# Patient Record
Sex: MALE | Race: OTHER | HISPANIC OR LATINO | Employment: OTHER | URBAN - METROPOLITAN AREA
[De-identification: names, ages, dates, MRNs, and addresses within clinical notes are randomized per-mention and may not be internally consistent; named-entity substitution may affect disease eponyms.]

---

## 2018-07-09 ENCOUNTER — HOSPITAL ENCOUNTER (EMERGENCY)
Facility: HOSPITAL | Age: 48
Discharge: HOME | End: 2018-07-09
Attending: EMERGENCY MEDICINE | Admitting: EMERGENCY MEDICINE
Payer: MEDICARE

## 2018-07-09 VITALS
RESPIRATION RATE: 16 BRPM | TEMPERATURE: 97 F | OXYGEN SATURATION: 100 % | DIASTOLIC BLOOD PRESSURE: 76 MMHG | SYSTOLIC BLOOD PRESSURE: 148 MMHG | HEART RATE: 92 BPM

## 2018-07-09 DIAGNOSIS — F32.A DEPRESSION: Primary | ICD-10-CM

## 2018-07-09 DIAGNOSIS — E03.9 HYPOTHYROIDISM: ICD-10-CM

## 2018-07-09 LAB
AMPHETAMINES SERPL QL SCN: NEGATIVE
ANION GAP SERPL CALCULATED.3IONS-SCNC: 10 MMOL/L (ref 4–13)
BACTERIA UR QL AUTO: NORMAL /HPF
BARBITURATES UR QL: NEGATIVE
BASOPHILS # BLD AUTO: 0.03 THOUSANDS/ΜL (ref 0–0.1)
BASOPHILS NFR BLD AUTO: 0 % (ref 0–1)
BENZODIAZ UR QL: NEGATIVE
BILIRUB UR QL STRIP: NEGATIVE
BUN SERPL-MCNC: 6 MG/DL (ref 5–25)
CALCIUM SERPL-MCNC: 9.1 MG/DL (ref 8.3–10.1)
CHLORIDE SERPL-SCNC: 100 MMOL/L (ref 100–108)
CLARITY UR: CLEAR
CO2 SERPL-SCNC: 27 MMOL/L (ref 21–32)
COCAINE UR QL: NEGATIVE
COLOR UR: YELLOW
CREAT SERPL-MCNC: 0.88 MG/DL (ref 0.6–1.3)
EOSINOPHIL # BLD AUTO: 0.01 THOUSAND/ΜL (ref 0–0.61)
EOSINOPHIL NFR BLD AUTO: 0 % (ref 0–6)
ERYTHROCYTE [DISTWIDTH] IN BLOOD BY AUTOMATED COUNT: 13 % (ref 11.6–15.1)
ETHANOL SERPL-MCNC: <3 MG/DL (ref 0–3)
GFR SERPL CREATININE-BSD FRML MDRD: 101 ML/MIN/1.73SQ M
GLUCOSE SERPL-MCNC: 99 MG/DL (ref 65–140)
GLUCOSE UR STRIP-MCNC: NEGATIVE MG/DL
HCT VFR BLD AUTO: 42.3 % (ref 36.5–49.3)
HGB BLD-MCNC: 14.1 G/DL (ref 12–17)
HGB UR QL STRIP.AUTO: ABNORMAL
KETONES UR STRIP-MCNC: NEGATIVE MG/DL
LEUKOCYTE ESTERASE UR QL STRIP: NEGATIVE
LYMPHOCYTES # BLD AUTO: 1.28 THOUSANDS/ΜL (ref 0.6–4.47)
LYMPHOCYTES NFR BLD AUTO: 11 % (ref 14–44)
MCH RBC QN AUTO: 31.3 PG (ref 26.8–34.3)
MCHC RBC AUTO-ENTMCNC: 33.3 G/DL (ref 31.4–37.4)
MCV RBC AUTO: 94 FL (ref 82–98)
METHADONE UR QL: NEGATIVE
MONOCYTES # BLD AUTO: 0.97 THOUSAND/ΜL (ref 0.17–1.22)
MONOCYTES NFR BLD AUTO: 8 % (ref 4–12)
NEUTROPHILS # BLD AUTO: 9.26 THOUSANDS/ΜL (ref 1.85–7.62)
NEUTS SEG NFR BLD AUTO: 80 % (ref 43–75)
NITRITE UR QL STRIP: NEGATIVE
NON-SQ EPI CELLS URNS QL MICRO: NORMAL /HPF
NRBC BLD AUTO-RTO: 0 /100 WBCS
OPIATES UR QL SCN: NEGATIVE
PCP UR QL: NEGATIVE
PH UR STRIP.AUTO: 6 [PH] (ref 4.5–8)
PLATELET # BLD AUTO: 299 THOUSANDS/UL (ref 149–390)
PMV BLD AUTO: 10.8 FL (ref 8.9–12.7)
POTASSIUM SERPL-SCNC: 3.6 MMOL/L (ref 3.5–5.3)
PROT UR STRIP-MCNC: NEGATIVE MG/DL
RBC # BLD AUTO: 4.51 MILLION/UL (ref 3.88–5.62)
RBC #/AREA URNS AUTO: NORMAL /HPF
SODIUM SERPL-SCNC: 137 MMOL/L (ref 136–145)
SP GR UR STRIP.AUTO: 1.01 (ref 1–1.03)
THC UR QL: NEGATIVE
TSH SERPL DL<=0.05 MIU/L-ACNC: 5.76 UIU/ML (ref 0.36–3.74)
UROBILINOGEN UR QL STRIP.AUTO: 0.2 E.U./DL
WBC # BLD AUTO: 11.55 THOUSAND/UL (ref 4.31–10.16)
WBC #/AREA URNS AUTO: NORMAL /HPF

## 2018-07-09 PROCEDURE — 85025 COMPLETE CBC W/AUTO DIFF WBC: CPT | Performed by: EMERGENCY MEDICINE

## 2018-07-09 PROCEDURE — 81001 URINALYSIS AUTO W/SCOPE: CPT

## 2018-07-09 PROCEDURE — 36415 COLL VENOUS BLD VENIPUNCTURE: CPT | Performed by: EMERGENCY MEDICINE

## 2018-07-09 PROCEDURE — 84443 ASSAY THYROID STIM HORMONE: CPT | Performed by: EMERGENCY MEDICINE

## 2018-07-09 PROCEDURE — 80307 DRUG TEST PRSMV CHEM ANLYZR: CPT | Performed by: EMERGENCY MEDICINE

## 2018-07-09 PROCEDURE — 80320 DRUG SCREEN QUANTALCOHOLS: CPT | Performed by: EMERGENCY MEDICINE

## 2018-07-09 PROCEDURE — 99284 EMERGENCY DEPT VISIT MOD MDM: CPT

## 2018-07-09 PROCEDURE — 80048 BASIC METABOLIC PNL TOTAL CA: CPT | Performed by: EMERGENCY MEDICINE

## 2018-07-09 RX ORDER — LEVOTHYROXINE SODIUM 0.05 MG/1
50 TABLET ORAL DAILY
Qty: 15 TABLET | Refills: 1 | Status: SHIPPED | OUTPATIENT
Start: 2018-07-09 | End: 2020-03-25 | Stop reason: ALTCHOICE

## 2018-07-09 NOTE — ED NOTES
Pt presents with increased depression due to losing his job 2 days ago  He was asked to stay for overtime and went home early so he was terminated  He states he needs help paying his bills  Per mother pt has been depressed for the past 2 days and has been stuck  She reports he used to receive disability and then wanted to work  He went to work and now he is having to repay social security back for funds he collected  Pt denies suicidal and homicidal ideations and denies auditory and visual hallucinations  Pt does not have any prior admissions  Pt will be discharged and will follow with the  [employer]  Pt in agreement for discharge

## 2018-07-09 NOTE — ED NOTES
Patient denies taking any medication for Thyroid " I don't need it ", per wife he is supposed to be taking the medication        Donal Cabot, RN  07/09/18 1123

## 2018-07-09 NOTE — ED PROVIDER NOTES
History  Chief Complaint   Patient presents with    Depression     utilizing  patient states "I can't pay for the bills I feel desperate, I'm not able to pay the bills I'm not able to pay the bills", patient denies SI/HI/AH/VH  two days of feeling this depression  52 yo male arrives with his parents, asking for help with emotional distress he describes as depression since losing his job 2 days ago  He denies SI/HI/AH/VH  He has been previously treated and admitted for depression, and is previously diagnosed with hypothyroidism but has been chronically noncompliant with medication  History provided by:  Patient      None       Past Medical History:   Diagnosis Date    Cancer ([facility] [address] )     Depression     Disease of thyroid gland     Psychiatric disorder        Past Surgical History:   Procedure Laterality Date    HERNIA REPAIR         History reviewed  No pertinent family history  I have reviewed and agree with the history as documented  Social History   Substance Use Topics    Smoking status: Never Smoker    Smokeless tobacco: Never Used    Alcohol use No        Review of Systems   Constitutional: Negative for appetite change, chills and fever  HENT: Negative for sore throat  Respiratory: Negative for cough, shortness of breath and wheezing  Cardiovascular: Negative for chest pain and palpitations  Gastrointestinal: Negative for abdominal pain, diarrhea, nausea and vomiting  Genitourinary: Negative for dysuria and hematuria  Musculoskeletal: Negative for neck pain  Skin: Negative for rash  Neurological: Negative for dizziness, weakness and headaches  Psychiatric/Behavioral: Negative for suicidal ideas  The patient is not nervous/anxious  All other systems reviewed and are negative  Physical Exam  Physical Exam   Constitutional: He is oriented to person, place, and time  He appears well-developed and well-nourished     HENT:   Head: Normocephalic and atraumatic  Right Ear: External ear normal    Left Ear: External ear normal    Nose: Nose normal    Mouth/Throat: Oropharynx is clear and moist    Eyes: Conjunctivae and EOM are normal  Pupils are equal, round, and reactive to light  Neck: Normal range of motion  Neck supple  Cardiovascular: Normal rate  Pulmonary/Chest: Effort normal    Abdominal: There is no tenderness  Musculoskeletal: Normal range of motion  Neurological: He is alert and oriented to person, place, and time  No cranial nerve deficit  Coordination normal    Skin: Skin is warm and dry  Psychiatric: He has a normal mood and affect  Judgment and thought content normal  His speech is delayed  He is slowed  Nursing note and vitals reviewed        Vital Signs  ED Triage Vitals [07/09/18 1122]   Temperature Pulse Respirations Blood Pressure SpO2   (!) 97 °F (36 1 °C) 92 16 148/76 100 %      Temp Source Heart Rate Source Patient Position - Orthostatic VS BP Location FiO2 (%)   Temporal Monitor Sitting Right arm --      Pain Score       --           Vitals:    07/09/18 1122   BP: 148/76   Pulse: 92   Patient Position - Orthostatic VS: Sitting       Visual Acuity      ED Medications  Medications - No data to display    Diagnostic Studies  Results Reviewed     Procedure Component Value Units Date/Time    Urine Microscopic [[id number]]  (Normal) Collected:  07/09/18 1237    Lab Status:  Final result Specimen:  Urine from Urine, Clean Catch Updated:  07/09/18 1315     RBC, UA None Seen /hpf      WBC, UA None Seen /hpf      Epithelial Cells Occasional /hpf      Bacteria, UA None Seen /hpf     Rapid drug screen, urine [[id number]]  (Normal) Collected:  07/09/18 1228    Lab Status:  Final result Specimen:  Urine from Urine, Clean Catch Updated:  07/09/18 1303     Amph/Meth UR Negative     Barbiturate Ur Negative     Benzodiazepine Urine Negative     Cocaine Urine Negative     Methadone Urine Negative     Opiate Urine Negative     PCP Ur Negative THC Urine Negative    Narrative:         FOR MEDICAL PURPOSES ONLY  IF CONFIRMATION NEEDED PLEASE CONTACT THE LAB WITHIN 5 DAYS  Drug Screen Cutoff Levels:  AMPHETAMINE/METHAMPHETAMINES  1000 ng/mL  BARBITURATES     200 ng/mL  BENZODIAZEPINES     200 ng/mL  COCAINE      300 ng/mL  METHADONE      300 ng/mL  OPIATES      300 ng/mL  PHENCYCLIDINE     25 ng/mL  THC       50 ng/mL    Basic metabolic panel [[id number]] Collected:  07/09/18 1227    Lab Status:  Final result Specimen:  Blood from Arm, Right Updated:  07/09/18 1259     Sodium 137 mmol/L      Potassium 3 6 mmol/L      Chloride 100 mmol/L      CO2 27 mmol/L      Anion Gap 10 mmol/L      BUN 6 mg/dL      Creatinine 0 88 mg/dL      Glucose 99 mg/dL      Calcium 9 1 mg/dL      eGFR 101 ml/min/1 73sq m     Narrative:         National Kidney Disease Education Program recommendations are as follows:  GFR calculation is accurate only with a steady state creatinine  Chronic Kidney disease less than 60 ml/min/1 73 sq  meters  Kidney failure less than 15 ml/min/1 73 sq  meters  TSH [[id number]]  (Abnormal) Collected:  07/09/18 1227    Lab Status:  Final result Specimen:  Blood from Arm, Right Updated:  07/09/18 1259     TSH 3RD GENERATON 5 761 (H) uIU/mL     Narrative:         Patients undergoing fluorescein dye angiography may retain small amounts of fluorescein in the body for 48-72 hours post procedure  Samples containing fluorescein can produce falsely depressed TSH values  If the patient had this procedure,a specimen should be resubmitted post fluorescein clearance      Ethanol [[id number]]  (Normal) Collected:  07/09/18 1227    Lab Status:  Final result Specimen:  Blood from Arm, Right Updated:  07/09/18 1257     Ethanol Lvl <3 mg/dL     CBC and differential [[id number]]  (Abnormal) Collected:  07/09/18 1227    Lab Status:  Final result Specimen:  Blood from Arm, Right Updated:  07/09/18 1240     WBC 11 55 (H) Thousand/uL      RBC 4 51 Million/uL      Hemoglobin 14 1 g/dL      Hematocrit 42 3 %      MCV 94 fL      MCH 31 3 pg      MCHC 33 3 g/dL      RDW 13 0 %      MPV 10 8 fL      Platelets 140 Thousands/uL      nRBC 0 /100 WBCs      Neutrophils Relative 80 (H) %      Lymphocytes Relative 11 (L) %      Monocytes Relative 8 %      Eosinophils Relative 0 %      Basophils Relative 0 %      Neutrophils Absolute 9 26 (H) Thousands/µL      Lymphocytes Absolute 1 28 Thousands/µL      Monocytes Absolute 0 97 Thousand/µL      Eosinophils Absolute 0 01 Thousand/µL      Basophils Absolute 0 03 Thousands/µL     POCT urinalysis dipstick [[id number]]  (Abnormal) Resulted:  07/09/18 1233    Lab Status:  Final result Updated:  07/09/18 1233    ED Urine Macroscopic [[id number]]  (Abnormal) Collected:  07/09/18 1237    Lab Status:  Final result Specimen:  Urine Updated:  07/09/18 1232     Color, UA Yellow     Clarity, UA Clear     pH, UA 6 0     Leukocytes, UA Negative     Nitrite, UA Negative     Protein, UA Negative mg/dl      Glucose, UA Negative mg/dl      Ketones, UA Negative mg/dl      Urobilinogen, UA 0 2 E U /dl      Bilirubin, UA Negative     Blood, UA Trace (A)     Specific [place], UA 1 010    Narrative:       CLINITEK RESULT                 No orders to display              Procedures  Procedures       Phone Contacts  ED Phone Contact    ED Course                               MDM  Number of Diagnoses or Management Options  Depression:   Hypothyroidism:   Diagnosis management comments: He has an acute social trigger for emotional distress, and is clearly hypothyroid, which is chronic now for years at similar levels that I found through EMR at [facility]  He was previously on 50mcg levothyroxine, so I will start there, encourage outpiatient follow up with PCP and resources from Crisis        CritCare Time    Disposition  Final diagnoses:   Depression   Hypothyroidism     Time reflects when diagnosis was documented in both MDM as applicable and the Disposition within this note     Time User Action Codes Description Comment    7/9/2018  2:18 PM Jaswinder Duarte Add [F32 9] Depression     7/9/2018  2:18 PM Jaswinder Duarte Add [E03 9] Hypothyroidism       ED Disposition     ED Disposition Condition Comment    Discharge  [address] discharge to home/self care  Condition at discharge: Good        Follow-up Information     Follow up With Specialties Details Why [address], [place] Nurse Practitioner Schedule an appointment as soon as possible for a visit For followup [address] [place] [id number] [phone]            Discharge Medication List as of 7/9/2018  2:19 PM      START taking these medications    Details   levothyroxine 50 mcg tablet Take 1 tablet (50 mcg total) by mouth daily for 15 days, Starting Mon 7/9/2018, Until Tue 7/24/2018, Print           No discharge procedures on file      ED Provider  Electronically Signed by           Drake Escamilla MD  07/09/18 7620

## 2018-07-09 NOTE — DISCHARGE INSTRUCTIONS
Hipotiroidismo   LO QUE NECESITA SABER:   El hipotiroidismo es cruz condición que se desarrolla cuando la glándula tiroides no produce suficiente hormona tiroidea  Las hormonas de la tiroides ayudan a controlar la temperatura del cuerpo, el ritmo cardíaco, el crecimiento, y peso corporal   Jodene Ashly:   Llame al 911 en david de presentar lo siguiente:   · Usted tiene dolor de pecho repentino o falta de aire  · Usted sufre cruz convulsión  · Usted siente que se va a desmayar  Regrese a la dominique de emergencias si:   · Usted tiene diarrea, temblores o dificultad para dormir  · Slava piernas, tobillos o pies están inflamados  Pregúntele a donohue Pilar Shove vitaminas y minerales son adecuados para usted  · Usted tiene fiebre  · Usted tiene escalofríos, tos o se siente débil y adolorido  · Usted tiene dolor e inflamación en los músculos y articulaciones  · Usted tiene comezón en la piel, inflamación o un sarpullido  · Slava signos y síntomas regresan o [place], aún después del [address]  · Usted tiene preguntas o inquietudes acerca de donohue condición o cuidado  Medicamentos:   · El reemplazo de la hormona tiroidea  ayuda a regresar el nivel de la hormona tiroidea a donohue normalidad  · [place] slava medicamentos emery se le haya indicado  Consulte con donohue médico si usted darnell que donohue medicamento no le está ayudando o si presenta efectos secundarios  Infórmele si es alérgico a cualquier medicamento  Mantenga cruz lista actualizada de los [place], las vitaminas y los productos herbales que wanda  Incluya los siguientes datos de los medicamentos: cantidad, frecuencia y motivo de administración  Traiga con usted la lista o los envases de la píldoras a slava citas de seguimiento  Lleve la lista de los medicamentos con usted en david de cruz emergencia  Acuda a slava consultas de control con donohue médico según le indicaron    Es probable que tenga que regresar para realizarse más exámenes de santos y controlar slava niveles de la hormona tiroides  Los exámenes mostrarán si usted está recibiendo la cantidad correcta de medicamentos para la tiroides  Anote slava preguntas para que se acuerde de hacerlas husam slava visitas  © 2017 [address] Information is for End User's use only and may not be sold, redistributed or otherwise used for commercial purposes  All illustrations and images included in CareNotes® are the copyrighted property of A D A M  Inc  or Mil Bryant  Esta información es sólo para uso en educación  Gómez intención no es darle un consejo médico sobre enfermedades o tratamientos  Colsulte con gómez Sánchez Ahle farmacéutico antes de seguir cualquier régimen médico para saber si es seguro y efectivo para usted

## 2018-07-10 ENCOUNTER — HOSPITAL ENCOUNTER (EMERGENCY)
Facility: HOSPITAL | Age: 48
Discharge: HOME | End: 2018-07-10
Attending: EMERGENCY MEDICINE | Admitting: EMERGENCY MEDICINE
Payer: MEDICARE

## 2018-07-10 VITALS
RESPIRATION RATE: 18 BRPM | DIASTOLIC BLOOD PRESSURE: 86 MMHG | SYSTOLIC BLOOD PRESSURE: 127 MMHG | TEMPERATURE: 96.9 F | WEIGHT: 141.98 LBS | HEART RATE: 89 BPM

## 2018-07-10 DIAGNOSIS — F32.A DEPRESSION: Primary | ICD-10-CM

## 2018-07-10 PROCEDURE — 99283 EMERGENCY DEPT VISIT LOW MDM: CPT

## 2018-07-10 RX ORDER — CITALOPRAM 20 MG/1
20 TABLET ORAL DAILY
Qty: 7 TABLET | Refills: 0 | Status: SHIPPED | OUTPATIENT
Start: 2018-07-10 | End: 2020-08-05 | Stop reason: ALTCHOICE

## 2018-07-11 NOTE — DISCHARGE INSTRUCTIONS
Depresión   CUIDADO AMBULATORIO:   Depresión  es un trastorno médico que causa sentimientos de tristeza o desesperanza que no desaparecen  La depresión puede causar que usted pierda interés en las cosas que antes disfrutaba  Estos sentimientos pueden llegar a interferir con donohue ness diaria  Los síntomas más comunes incluyen los siguientes:   · Cambios en el apetito, o aumento o pérdida de peso    · Dificultad para ir a dormir o permanecer dormido, o dormir demasiado    · Fatiga o falta de energía    · Sensación de inquietud, irritabilidad o necesidad de alejarse de los demás    · Sentirse inútil, desesperanzado, desanimado o culpable    · Dificultad para concentrarse, recordar cosas, hacer tareas diarias o rainer decisiones    · Sentimientos de hacerse daño o suicidarse  Llame al 911 en david de presentar lo siguiente:   · Usted piensa en lastimarse o lastimar a alguien más  Pregúntele a donohue Cathi Paddy vitaminas y minerales son adecuados para usted  · Imelda síntomas no mejoran  · No puede asistir a donohue próxima juan  · Usted tiene síntomas nuevos  · Usted tiene preguntas o inquietudes acerca de donohue condición o cuidado  El tratamiento para la depresión  puede incluir medicamento para mejorar o estabilizar donohue estado de ánimo  También se puede usar terapia para tratar la depresión  Un terapeuta le ayudará a aprender a lidiar con imelda pensamientos y sentimientos  La terapia se puede realizar ashely o en josh  También podría realizarse con imelda familiares o con donohue fabian  Cuidados personales:   · Realice actividad física regularmente  Trate de ejercitarse por 30 minutos, 3 a 5 días a la semana  Colabore con donohue médico para crear un plan de ejercicios que usted disfrute  La actividad física puede llegar a mejorar imelda síntomas  · Duerma lo suficiente  Invente cruz rutina que le ayude a relajarse antes de irse a dormir  Puede escuchar música, leer o hacer yoga   Trate de irse a dormir y despertarse al [facility] todos los mimi  El sueño es importante para la brina emocional      · Consuma cruz variedad de alimentos saludables de todos los grupos alimenticios  Un plan alimenticio saludable es bajo en grasas, sal y azúcar adicional  Solicite con donohue médico más información sobre un plan de comidas que sea el adecuado para usted  · No tome alcohol ni use drogas  El alcohol y las drogas pueden empeorar slava síntomas  Acuda a slava consultas de control con donohue médico según le indicaron  Donohue médico vigilará donohue progreso en las citas de seguimiento  También monitoreará donohue medicamento si usted está tomando antidepresivos  Donohue médico le preguntará si el [employer] está ayudando  Dígale sobre cualquier efecto secundario o problemas que usted tenga con donohue medicamento  Podría ser necesario cambiar el tipo o cantidad de [place]  Anote slava preguntas para que se acuerde de hacerlas husam slava visitas  © 2017 [address]  Information is for End User's use only and may not be sold, redistributed or otherwise used for commercial purposes  All illustrations and images included in CareNotes® are the copyrighted property of [employer]  or Mil Bryant  Esta información es sólo para uso en educación  Donohue intención no es darle un consejo médico sobre enfermedades o tratamientos  Colsulte con donohue Dorna Dioni farmacéutico antes de seguir cualquier régimen médico para saber si es seguro y efectivo para usted

## 2018-07-11 NOTE — ED PROVIDER NOTES
History  Chief Complaint   Patient presents with    Depression     Patient accompanied by parents, who reports that patient has been depressed and eating very little, and not talking very much for the past 4 days  52 y/o  male here with parents - state patient is "depressed"; was seen/evaluated at [facility] less than 24 hrs ago for same and referred to [facility]  Patient is non-compliant with meds and just lost his job  Patient lives with parents  He denies SI/HI, and states he "needs help paying his bills "  No prior psych admissions or suicide attempts  Prior to Admission Medications   Prescriptions Last Dose Informant Patient Reported? Taking?   levothyroxine 50 mcg tablet   No No   Sig: Take 1 tablet (50 mcg total) by mouth daily for 15 days      Facility-Administered Medications: None       Past Medical History:   Diagnosis Date    Cancer ([facility] [address] )     Depression     Disease of thyroid gland     Psychiatric disorder        Past Surgical History:   Procedure Laterality Date    HERNIA REPAIR         History reviewed  No pertinent family history  I have reviewed and agree with the history as documented  Social History   Substance Use Topics    Smoking status: Never Smoker    Smokeless tobacco: Never Used    Alcohol use No        Review of Systems   Constitutional: Positive for appetite change  Psychiatric/Behavioral: Positive for dysphoric mood  All other systems reviewed and are negative  Physical Exam  Physical Exam   Constitutional: He is oriented to person, place, and time  He appears well-developed and well-nourished  HENT:   Head: Normocephalic and atraumatic  Eyes: Conjunctivae and EOM are normal  Pupils are equal, round, and reactive to light  Neck: Normal range of motion  Neck supple  Cardiovascular: Normal rate, regular rhythm and normal heart sounds  Pulmonary/Chest: Effort normal and breath sounds normal    Abdominal: Soft   Bowel sounds are normal    Musculoskeletal: Normal range of motion  Neurological: He is alert and oriented to person, place, and time  No cranial nerve deficit  Coordination normal    Skin: Skin is warm and dry  Psychiatric: He has a normal mood and affect  Vital Signs  ED Triage Vitals [07/10/18 2255]   Temperature Pulse Respirations Blood Pressure SpO2   (!) 96 9 °F (36 1 °C) 89 18 127/86 --      Temp Source Heart Rate Source Patient Position - Orthostatic VS BP Location FiO2 (%)   Temporal Monitor Sitting Left arm --      Pain Score       No Pain           Vitals:    07/10/18 2255   BP: 127/86   Pulse: 89   Patient Position - Orthostatic VS: Sitting       Visual Acuity      ED Medications  Medications - No data to display    Diagnostic Studies  Results Reviewed     None                 No orders to display              Procedures  Procedures       Phone Contacts  ED Phone Contact    ED Course                               MDM  CritCare Time    Disposition  Final diagnoses:   Depression     Time reflects when diagnosis was documented in both MDM as applicable and the Disposition within this note     Time User Action Codes Description Comment    7/10/2018 11:26 PM Fred Phillips Add [F32 9] Depression       ED Disposition     ED Disposition Condition Comment    Discharge  [address] discharge to home/self care  Condition at discharge: Good        Follow-up Information    None         Patient's Medications   Discharge Prescriptions    CITALOPRAM (CELEXA) 20 MG TABLET    Take 1 tablet (20 mg total) by mouth daily       Start Date: 7/10/2018 End Date: --       Order Dose: 20 mg       Quantity: 7 tablet    Refills: 0     No discharge procedures on file      ED Provider  Electronically Signed by           Bill Stout DO  07/10/18 5281

## 2019-05-28 LAB — EXTERNAL HIV SCREEN: NORMAL

## 2020-01-23 ENCOUNTER — HOSPITAL ENCOUNTER (EMERGENCY)
Facility: HOSPITAL | Age: 50
Discharge: HOME | End: 2020-01-23
Attending: EMERGENCY MEDICINE
Payer: COMMERCIAL

## 2020-01-23 VITALS
RESPIRATION RATE: 18 BRPM | TEMPERATURE: 98 F | SYSTOLIC BLOOD PRESSURE: 134 MMHG | HEART RATE: 96 BPM | DIASTOLIC BLOOD PRESSURE: 84 MMHG | WEIGHT: 160.27 LBS | OXYGEN SATURATION: 98 %

## 2020-01-23 DIAGNOSIS — H57.89 IRRITATION OF LEFT EYE: Primary | ICD-10-CM

## 2020-01-23 PROCEDURE — 99282 EMERGENCY DEPT VISIT SF MDM: CPT | Performed by: PHYSICIAN ASSISTANT

## 2020-01-23 PROCEDURE — 99283 EMERGENCY DEPT VISIT LOW MDM: CPT

## 2020-01-23 NOTE — ED PROVIDER NOTES
History  Chief Complaint   Patient presents with    Eye Redness     Pt c/o left eye redness for 4 days; denies injury; saw eye dr 3 days ago and given rx of eye drops; Pt reports eye drops help but redness comes back     Patient is a 50-year-old male with past medical history of depression and hypothyroidism who presents for evaluation of left eye redness  He states that he had similar symptoms at the beginning of December and saw his eye doctor  He was given a prescription for refresh eyedrops as well as steroid eye drops to use for 1 week  States that he used these in symptoms resolved  He does not recall what his diagnosis was at that time  He states about 2-3 days ago he started with similar  There was some redness and mild irritation to the lateral aspect of the left eye  He states there is a small bump/growth there  He does not recall if this was present in the past or not  He has not seen his eye doctor for this recently  He denies any injury or trauma  No known hx of same  He denies any vision changes, photophobia, pain with eye movement, itching or drainage  He denies other URI symptoms  Denies fever, chills, nausea vomiting diarrhea, headache  No other complaints  Prior to Admission Medications   Prescriptions Last Dose Informant Patient Reported? Taking?   citalopram (CeleXA) 20 mg tablet   No No   Sig: Take 1 tablet (20 mg total) by mouth daily   levothyroxine 50 mcg tablet   No No   Sig: Take 1 tablet (50 mcg total) by mouth daily for 15 days      Facility-Administered Medications: None       Past Medical History:   Diagnosis Date    Cancer ([facility] [address] )     Depression     Disease of thyroid gland     Psychiatric disorder        Past Surgical History:   Procedure Laterality Date    HERNIA REPAIR         History reviewed  No pertinent family history  I have reviewed and agree with the history as documented      Social History     Tobacco Use    Smoking status: Never Smoker    Smokeless tobacco: Never Used   Substance Use Topics    Alcohol use: No    Drug use: No        Review of Systems   Constitutional: Negative for chills and fever  HENT: Negative for congestion, ear pain and sore throat  Eyes: Positive for redness  Negative for photophobia, pain, discharge, itching and visual disturbance  Clear/yellow bump to left eye   Respiratory: Negative for cough and shortness of breath  Cardiovascular: Negative for chest pain  Gastrointestinal: Negative for abdominal pain, diarrhea, nausea and vomiting  Skin: Negative for rash  Neurological: Negative for headaches  All other systems reviewed and are negative  Physical Exam  Physical Exam   Constitutional: Vital signs are normal  He appears well-developed and well-nourished  He is active  Non-toxic appearance  No distress  HENT:   Head: Normocephalic and atraumatic  Right Ear: Hearing, tympanic membrane, external ear and ear canal normal    Left Ear: Hearing, tympanic membrane, external ear and ear canal normal    Nose: Nose normal    Mouth/Throat: Oropharynx is clear and moist  No oropharyngeal exudate  Eyes: Pupils are equal, round, and reactive to light  EOM and lids are normal  Right eye exhibits no chemosis, no discharge, no exudate and no hordeolum  No foreign body present in the right eye  Left eye exhibits no chemosis, no discharge, no exudate and no hordeolum  No foreign body present in the left eye  Right conjunctiva is not injected  Right conjunctiva has no hemorrhage  Left conjunctiva is injected  Left conjunctiva has no hemorrhage  Left lateral eye with small area of injection/irritation  There is a clear/yellow growth  No significant tenderness  No bleeding or drainage  EOMI bilaterally and non tender  No periorbital erythema, swelling, warmth, ecchymosis  Visual acuity 20/25 bilaterally corrected  Neck: Normal range of motion     Cardiovascular: Normal rate, regular rhythm and normal heart sounds  Exam reveals no gallop and no friction rub  No murmur heard  Pulmonary/Chest: Effort normal and breath sounds normal  No stridor  No respiratory distress  He has no wheezes  Neurological: He is alert  Skin: Skin is warm and dry  He is not diaphoretic  Psychiatric: He has a normal mood and affect  His behavior is normal    Nursing note and vitals reviewed  Vital Signs  ED Triage Vitals [01/23/20 1045]   Temperature Pulse Respirations Blood Pressure SpO2   98 °F (36 7 °C) 96 18 134/84 98 %      Temp Source Heart Rate Source Patient Position - Orthostatic VS BP Location FiO2 (%)   Oral Monitor -- -- --      Pain Score       No Pain           Vitals:    01/23/20 1045   BP: 134/84   Pulse: 96         Visual Acuity  Visual Acuity      Most Recent Value   Visual acuity R eye is  20/200   Visual acuity Left eye is  20/200   Visual acuity in both eyes is  20/200   Visual acuity corrected  20/25 right,  left & both eyes   Wearing corrective eyewear/lenses? Yes   L Pupil Size (mm)  3   R Pupil Size (mm)  3   L Pupil Shape  Round   R Pupil Shape  Round          ED Medications  Medications - No data to display    Diagnostic Studies  Results Reviewed     None                 No orders to display              Procedures  Procedures         ED Course                               MDM  Number of Diagnoses or Management Options  Irritation of left eye:   Diagnosis management comments: Small area of irritation and clear/yellow growth to left lateral eye x 2-3 days  Differential diagnosis includes but is not limited to conjunctivitis, conjunctival cyst, pinguecula, pterygium  Patient with no vision changes  No injury or trauma  No drainage or crusting  No itching or pain  No limited or pain with eye movement  Discussed he should follow up with his eye doctor for further evaluation and treatment  Call today to schedule a follow up appointment in 1 day   Advised he may continue to use his refresh lubricating eye drops but would avoid using the steroid eye drops until he sees his eye doctor  Return to the ED if symptoms worsen or new symptoms arise  Patient states understanding and agrees with plan  Patient Progress  Patient progress: stable        Disposition  Final diagnoses:   Irritation of left eye     Time reflects when diagnosis was documented in both MDM as applicable and the Disposition within this note     Time User Action Codes Description Comment    1/23/2020 11:51 AM Sudhir Wilson Add [H57 89] Irritation of left eye       ED Disposition     ED Disposition Condition Date/Time Comment    Discharge Stable Thu Jan 23, 2020 11:47 AM Danya Dalal discharge to home/self care  Follow-up Information     Follow up With Specialties Details Why Contact Info Additional Information        Follow up with your eye doctor in 1 day- call today to schedule an appointment and discuss your symptoms  [facility] for Critical access hospital  Schedule an appointment as soon as possible for a visit in 1 day If you do not have an eye doctor or can not get in to see your doctor [address]  [phone]     [facility] Emergency Department Emergency Medicine  If symptoms worsen [facility] [id number]  [phone] [place] ED, [address] , [place], [place], [address]          Discharge Medication List as of 1/23/2020 11:53 AM      CONTINUE these medications which have NOT CHANGED    Details   citalopram (CeleXA) 20 mg tablet Take 1 tablet (20 mg total) by mouth daily, Starting Tue 7/10/2018, Print      levothyroxine 50 mcg tablet Take 1 tablet (50 mcg total) by mouth daily for 15 days, Starting Mon 7/9/2018, Until Tue 7/24/2018, Print           No discharge procedures on file      ED Provider  Electronically Signed by           Sheela Georges PA-C  01/23/20 [address] [facility], PA-C  01/23/20 4076

## 2020-03-03 ENCOUNTER — TELEPHONE (OUTPATIENT)
Dept: ADMINISTRATIVE | Facility: OTHER | Age: 50
End: 2020-03-03

## 2020-03-03 NOTE — TELEPHONE ENCOUNTER
Upon review of the In Basket request we were able to locate, review, and update the patient chart as requested for HIV  Any additional questions or concerns should be emailed to the Practice Liaisons via [email or web address] email, please do not reply via In Basket      Thank you  Kenneth Sapp

## 2020-03-03 NOTE — TELEPHONE ENCOUNTER
----- Message from Corwin Jung sent at 3/3/2020  9:50 AM EST -----  Regarding: HIV  03/03/20 9:50 AM    Hello, our patient Hiwot Vega has had HIV completed/performed  Please assist in updating the patient chart by pulling the Care Everywhere (CE) document  The date of service is 05/28/2019       Thank you,  Corwin Jung   [address]

## 2020-03-25 ENCOUNTER — APPOINTMENT (OUTPATIENT)
Dept: LAB | Facility: HOSPITAL | Age: 50
End: 2020-03-25
Payer: COMMERCIAL

## 2020-03-25 ENCOUNTER — OFFICE VISIT (OUTPATIENT)
Dept: FAMILY MEDICINE CLINIC | Facility: CLINIC | Age: 50
End: 2020-03-25
Payer: COMMERCIAL

## 2020-03-25 ENCOUNTER — TELEPHONE (OUTPATIENT)
Dept: ADMINISTRATIVE | Facility: OTHER | Age: 50
End: 2020-03-25

## 2020-03-25 VITALS
OXYGEN SATURATION: 98 % | HEART RATE: 100 BPM | BODY MASS INDEX: 27.16 KG/M2 | SYSTOLIC BLOOD PRESSURE: 132 MMHG | RESPIRATION RATE: 18 BRPM | DIASTOLIC BLOOD PRESSURE: 81 MMHG | HEIGHT: 65 IN | TEMPERATURE: 98.2 F | WEIGHT: 163 LBS

## 2020-03-25 DIAGNOSIS — F39 MOOD DISORDER WITH PSYCHOSIS (HCC): ICD-10-CM

## 2020-03-25 DIAGNOSIS — Z12.11 ENCOUNTER FOR SCREENING COLONOSCOPY: ICD-10-CM

## 2020-03-25 DIAGNOSIS — E03.8 OTHER SPECIFIED HYPOTHYROIDISM: ICD-10-CM

## 2020-03-25 DIAGNOSIS — Z12.11 SCREENING FOR COLON CANCER: ICD-10-CM

## 2020-03-25 DIAGNOSIS — R53.83 OTHER FATIGUE: ICD-10-CM

## 2020-03-25 DIAGNOSIS — R73.9 HYPERGLYCEMIA: ICD-10-CM

## 2020-03-25 DIAGNOSIS — Z13.220 SCREENING FOR CHOLESTEROL LEVEL: ICD-10-CM

## 2020-03-25 DIAGNOSIS — E03.8 OTHER SPECIFIED HYPOTHYROIDISM: Primary | ICD-10-CM

## 2020-03-25 DIAGNOSIS — Z85.71 HISTORY OF HODGKIN'S LYMPHOMA: ICD-10-CM

## 2020-03-25 PROBLEM — R01.1 SYSTOLIC MURMUR: Status: ACTIVE | Noted: 2018-09-27

## 2020-03-25 PROBLEM — N28.9 KIDNEY LESION: Status: ACTIVE | Noted: 2019-01-29

## 2020-03-25 LAB
ALBUMIN SERPL BCP-MCNC: 4.5 G/DL (ref 3–5.2)
ALP SERPL-CCNC: 84 U/L (ref 43–122)
ALT SERPL W P-5'-P-CCNC: 26 U/L (ref 9–52)
ANION GAP SERPL CALCULATED.3IONS-SCNC: 9 MMOL/L (ref 5–14)
AST SERPL W P-5'-P-CCNC: 24 U/L (ref 17–59)
BASOPHILS # BLD AUTO: 0.1 THOUSANDS/ΜL (ref 0–0.1)
BASOPHILS NFR BLD AUTO: 1 % (ref 0–1)
BILIRUB SERPL-MCNC: 0.5 MG/DL
BUN SERPL-MCNC: 15 MG/DL (ref 5–25)
CALCIUM SERPL-MCNC: 9.1 MG/DL (ref 8.4–10.2)
CHLORIDE SERPL-SCNC: 104 MMOL/L (ref 97–108)
CHOLEST SERPL-MCNC: 187 MG/DL
CO2 SERPL-SCNC: 26 MMOL/L (ref 22–30)
CREAT SERPL-MCNC: 0.73 MG/DL (ref 0.7–1.5)
EOSINOPHIL # BLD AUTO: 0.1 THOUSAND/ΜL (ref 0–0.4)
EOSINOPHIL NFR BLD AUTO: 1 % (ref 0–6)
ERYTHROCYTE [DISTWIDTH] IN BLOOD BY AUTOMATED COUNT: 13.3 %
EST. AVERAGE GLUCOSE BLD GHB EST-MCNC: 105 MG/DL
GFR SERPL CREATININE-BSD FRML MDRD: 108 ML/MIN/1.73SQ M
GLUCOSE P FAST SERPL-MCNC: 93 MG/DL (ref 70–99)
HBA1C MFR BLD: 5.3 %
HCT VFR BLD AUTO: 48.5 % (ref 41–53)
HDLC SERPL-MCNC: 58 MG/DL
HGB BLD-MCNC: 16.4 G/DL (ref 13.5–17.5)
LDLC SERPL CALC-MCNC: 108 MG/DL
LYMPHOCYTES # BLD AUTO: 1 THOUSANDS/ΜL (ref 0.5–4)
LYMPHOCYTES NFR BLD AUTO: 15 % (ref 25–45)
MCH RBC QN AUTO: 31.4 PG (ref 26–34)
MCHC RBC AUTO-ENTMCNC: 33.8 G/DL (ref 31–36)
MCV RBC AUTO: 93 FL (ref 80–100)
MONOCYTES # BLD AUTO: 0.5 THOUSAND/ΜL (ref 0.2–0.9)
MONOCYTES NFR BLD AUTO: 7 % (ref 1–10)
NEUTROPHILS # BLD AUTO: 5.2 THOUSANDS/ΜL (ref 1.8–7.8)
NEUTS SEG NFR BLD AUTO: 76 % (ref 45–65)
NONHDLC SERPL-MCNC: 129 MG/DL
PLATELET # BLD AUTO: 259 THOUSANDS/UL (ref 150–450)
PMV BLD AUTO: 9.4 FL (ref 8.9–12.7)
POTASSIUM SERPL-SCNC: 3.8 MMOL/L (ref 3.6–5)
PROT SERPL-MCNC: 8.2 G/DL (ref 5.9–8.4)
RBC # BLD AUTO: 5.21 MILLION/UL (ref 4.5–5.9)
SODIUM SERPL-SCNC: 139 MMOL/L (ref 137–147)
TRIGL SERPL-MCNC: 106 MG/DL
TSH SERPL DL<=0.05 MIU/L-ACNC: 9.94 UIU/ML (ref 0.47–4.68)
WBC # BLD AUTO: 6.9 THOUSAND/UL (ref 4.5–11)

## 2020-03-25 PROCEDURE — 99204 OFFICE O/P NEW MOD 45 MIN: CPT | Performed by: NURSE PRACTITIONER

## 2020-03-25 PROCEDURE — 83036 HEMOGLOBIN GLYCOSYLATED A1C: CPT

## 2020-03-25 PROCEDURE — 84443 ASSAY THYROID STIM HORMONE: CPT

## 2020-03-25 PROCEDURE — 36415 COLL VENOUS BLD VENIPUNCTURE: CPT

## 2020-03-25 PROCEDURE — 80061 LIPID PANEL: CPT

## 2020-03-25 PROCEDURE — 1036F TOBACCO NON-USER: CPT | Performed by: NURSE PRACTITIONER

## 2020-03-25 PROCEDURE — 3008F BODY MASS INDEX DOCD: CPT | Performed by: NURSE PRACTITIONER

## 2020-03-25 PROCEDURE — 85025 COMPLETE CBC W/AUTO DIFF WBC: CPT

## 2020-03-25 PROCEDURE — 80053 COMPREHEN METABOLIC PANEL: CPT

## 2020-03-25 PROCEDURE — 84439 ASSAY OF FREE THYROXINE: CPT

## 2020-03-25 RX ORDER — LEVOTHYROXINE SODIUM 0.07 MG/1
75 TABLET ORAL
Qty: 30 TABLET | Refills: 5 | Status: SHIPPED | OUTPATIENT
Start: 2020-03-25 | End: 2020-09-04 | Stop reason: SDUPTHER

## 2020-03-25 RX ORDER — LEVOTHYROXINE SODIUM 0.07 MG/1
TABLET ORAL
COMMUNITY
Start: 2020-01-03 | End: 2020-03-25 | Stop reason: SDUPTHER

## 2020-03-25 NOTE — ASSESSMENT & PLAN NOTE
-He was treated with chemoradiation in 1993 for this  Patient was under heme/onc in [facility]  Today would like to be transferred to [address]  Referral given to heme/onc today for further evaluation

## 2020-03-25 NOTE — PROGRESS NOTES
BMI Counseling: Body mass index is 27 12 kg/m²  The BMI is above normal  Nutrition recommendations include encouraging healthy choices of fruits and vegetables, decreasing fast food intake, consuming healthier snacks, limiting drinks that contain sugar, increasing intake of lean protein, reducing intake of saturated and trans fat and reducing intake of cholesterol  Exercise recommendations include moderate physical activity 150 minutes/week  Depression Screening and Follow-up Plan: Patient's depression screening was positive with a PHQ-2 score of 0  Continue regular follow-up with their mental health provider who is managing their mental health condition(s)  Is going to [facility] to see the therapist  Offered our services but patient declined  Assessment/Plan:    Other specified hypothyroidism  Discussed with patient that the goal of treatment is reduction of symptoms and prevention of long term complications  Treatment is usually given upon establishing the diagnosis and is lifelong  Discussed with patient that there is no evidence that "natural" or pig thyroid extract provides clinically reliable therapy  Also discussed with patient the main complication of over-replacement of thyroid hormone Is increased risk of osteoporosis and a-fib  We discussed increasing or decreasing in small increment every 8 weeks to normalize TSH level  Once we receive at least 2 normal TSH levels we will monitor TSH level every 6-12 months  History of Hodgkin's lymphoma  -He was treated with chemoradiation in 1993 for this  Patient was under heme/onc in [facility] [employer]  Today would like to be transferred to [address]  Referral given to heme/onc today for further evaluation  Mood disorder with psychosis ([facility] [address] )  -Pt symptoms controlled at this time without use of Celexa  Patient denies suicidal/homicidal ideations  Offered referral to psych but at this time he refused          Diagnoses and all orders for this visit:    Other specified hypothyroidism  -     TSH, 3rd generation with Free T4 reflex; Future  -     Lipid panel; Future  -     levothyroxine 75 mcg tablet; Take 1 tablet (75 mcg total) by mouth daily in the early morning    History of Hodgkin's lymphoma  -     Ambulatory referral to Hematology / Oncology; Future    Mood disorder with psychosis ([facility] )    Encounter for screening colonoscopy    Screening for cholesterol level    Hyperglycemia  -     Comprehensive metabolic panel; Future  -     HEMOGLOBIN A1C W/ EAG ESTIMATION; Future    Other fatigue  -     CBC and differential; Future    Screening for colon cancer  -     Occult Blood, Fecal Immunochemical; Future    Other orders  -     Discontinue: levothyroxine 75 mcg tablet  -     Cancel: Ambulatory referral to Gastroenterology; Future          Subjective:      Patient ID: Scar Reid is a 47 y o  male  Patient presents with:  Establish Care    47year old male patient here to establish care  PMHx: depression and thyroid  Patient states his depression is controlled  He is not symptomatic  He was using Celexa but as his symptoms are controlled he stopped taking his medication  He states that he does have a therapist he sees at [address] [place] [address] and needs to follow up  Denies suicidal/homicidal ideation  Also history of Hodgkins lymphoma and was seeing heme/onc in [facility], will [facility]  Thyroid Problem   Presents for initial visit  Symptoms include dry skin  Patient reports no cold intolerance, constipation, diarrhea, fatigue, hair loss, heat intolerance, nail problem, palpitations, weight gain or weight loss  The symptoms have been stable  Past treatments include levothyroxine  The treatment provided significant relief  The following procedures have not been performed: radioiodine uptake scan, thyroid FNA, thyroid ultrasound and thyroidectomy   There is no history of atrial fibrillation, diabetes, hyperlipidemia, neuropathy, obesity or osteopenia  Risk factors include family history of hypothyroidism  The following portions of the patient's history were reviewed and updated as appropriate: allergies, current medications, past family history, past medical history, past social history, past surgical history and problem list     Review of Systems   Constitutional: Negative  Negative for appetite change, fatigue, fever, weight gain and weight loss  HENT: Negative  Eyes: Negative  Respiratory: Negative  Negative for cough, chest tightness, shortness of breath and wheezing  Cardiovascular: Negative  Negative for chest pain and palpitations  Gastrointestinal: Negative  Negative for constipation and diarrhea  Endocrine: Negative  Negative for cold intolerance and heat intolerance  Genitourinary: Negative  Musculoskeletal: Negative  Negative for arthralgias  Skin: Negative  Allergic/Immunologic: Negative  Neurological: Negative  Negative for dizziness and headaches  Hematological: Negative  Negative for adenopathy  Does not bruise/bleed easily  Psychiatric/Behavioral: Negative  Objective:      /81 (BP Location: Right arm, Patient Position: Sitting, Cuff Size: Standard)   Pulse 100   Temp 98 2 °F (36 8 °C) (Oral)   Resp 18   Ht 5' 5" (1 651 m)   Wt 73 9 kg (163 lb)   SpO2 98%   BMI 27 12 kg/m²          Physical Exam   Constitutional: He is oriented to person, place, and time  He appears well-developed and well-nourished  No distress  HENT:   Head: Normocephalic and atraumatic  Eyes: Pupils are equal, round, and reactive to light  EOM are normal    Neck: Normal range of motion  Neck supple  Cardiovascular: Normal rate, regular rhythm, normal heart sounds and intact distal pulses  Exam reveals no gallop and no friction rub  No murmur heard  Pulmonary/Chest: Effort normal and breath sounds normal  No respiratory distress  He has no wheezes  Abdominal: Soft  Bowel sounds are normal    Musculoskeletal: Normal range of motion  Lymphadenopathy:     He has no cervical adenopathy  Neurological: He is alert and oriented to person, place, and time  Skin: Skin is warm and dry  Capillary refill takes less than 2 seconds  He is not diaphoretic  Psychiatric: He has a normal mood and affect  His behavior is normal    Nursing note and vitals reviewed

## 2020-03-25 NOTE — TELEPHONE ENCOUNTER
----- Message from Adriane Reyes sent at 3/25/2020 10:46 AM EDT -----  Regarding: AWV  03/25/20 10:46 AM    Hello, our patient Jazz Tiwari has had Medicare AWV completed/performed  Please assist in updating the patient chart by pulling the Care Everywhere (CE) document  The date of service is 4/11/2019       Thank you,  Bety Newberry   [address]

## 2020-03-25 NOTE — TELEPHONE ENCOUNTER
Upon review of the In Basket request we were able to locate, review, and update the patient chart as requested for Medicare AW  Any additional questions or concerns should be emailed to the Practice Liaisons via [email or web address] email, please do not reply via In Basket      Thank you  Martha Rucker

## 2020-03-25 NOTE — ASSESSMENT & PLAN NOTE
Discussed with patient that the goal of treatment is reduction of symptoms and prevention of long term complications  Treatment is usually given upon establishing the diagnosis and is lifelong  Discussed with patient that there is no evidence that "natural" or pig thyroid extract provides clinically reliable therapy  Also discussed with patient the main complication of over-replacement of thyroid hormone Is increased risk of osteoporosis and a-fib  We discussed increasing or decreasing in small increment every 8 weeks to normalize TSH level  Once we receive at least 2 normal TSH levels we will monitor TSH level every 6-12 months

## 2020-03-26 LAB — T4 FREE SERPL-MCNC: 0.85 NG/DL (ref 0.76–1.46)

## 2020-04-27 ENCOUNTER — TELEPHONE (OUTPATIENT)
Dept: HEMATOLOGY ONCOLOGY | Facility: CLINIC | Age: 50
End: 2020-04-27

## 2020-04-28 ENCOUNTER — OFFICE VISIT (OUTPATIENT)
Dept: FAMILY MEDICINE CLINIC | Facility: CLINIC | Age: 50
End: 2020-04-28
Payer: COMMERCIAL

## 2020-04-28 VITALS
OXYGEN SATURATION: 98 % | TEMPERATURE: 97.3 F | SYSTOLIC BLOOD PRESSURE: 130 MMHG | WEIGHT: 166 LBS | DIASTOLIC BLOOD PRESSURE: 82 MMHG | RESPIRATION RATE: 20 BRPM | BODY MASS INDEX: 27.66 KG/M2 | HEART RATE: 100 BPM | HEIGHT: 65 IN

## 2020-04-28 DIAGNOSIS — F39 MOOD DISORDER WITH PSYCHOSIS (HCC): ICD-10-CM

## 2020-04-28 DIAGNOSIS — Z12.5 SCREENING FOR PROSTATE CANCER: ICD-10-CM

## 2020-04-28 DIAGNOSIS — Z00.00 MEDICARE ANNUAL WELLNESS VISIT, SUBSEQUENT: Primary | ICD-10-CM

## 2020-04-28 DIAGNOSIS — E03.8 OTHER SPECIFIED HYPOTHYROIDISM: ICD-10-CM

## 2020-04-28 PROCEDURE — 1036F TOBACCO NON-USER: CPT | Performed by: NURSE PRACTITIONER

## 2020-04-28 PROCEDURE — 3008F BODY MASS INDEX DOCD: CPT | Performed by: NURSE PRACTITIONER

## 2020-04-28 PROCEDURE — G0439 PPPS, SUBSEQ VISIT: HCPCS | Performed by: NURSE PRACTITIONER

## 2020-05-03 ENCOUNTER — APPOINTMENT (OUTPATIENT)
Dept: LAB | Facility: HOSPITAL | Age: 50
End: 2020-05-03
Payer: COMMERCIAL

## 2020-05-03 DIAGNOSIS — Z12.11 SCREENING FOR COLON CANCER: ICD-10-CM

## 2020-05-03 LAB — HEMOCCULT STL QL IA: NEGATIVE

## 2020-05-03 PROCEDURE — G0328 FECAL BLOOD SCRN IMMUNOASSAY: HCPCS

## 2020-06-03 ENCOUNTER — CONSULT (OUTPATIENT)
Dept: HEMATOLOGY ONCOLOGY | Facility: CLINIC | Age: 50
End: 2020-06-03
Payer: COMMERCIAL

## 2020-06-03 VITALS
WEIGHT: 165.6 LBS | HEIGHT: 66 IN | DIASTOLIC BLOOD PRESSURE: 78 MMHG | TEMPERATURE: 98.3 F | BODY MASS INDEX: 26.61 KG/M2 | HEART RATE: 120 BPM | SYSTOLIC BLOOD PRESSURE: 136 MMHG | RESPIRATION RATE: 18 BRPM | OXYGEN SATURATION: 96 %

## 2020-06-03 DIAGNOSIS — Z85.71 HISTORY OF HODGKIN'S LYMPHOMA: ICD-10-CM

## 2020-06-03 PROCEDURE — 99204 OFFICE O/P NEW MOD 45 MIN: CPT | Performed by: INTERNAL MEDICINE

## 2020-06-12 ENCOUNTER — TELEPHONE (OUTPATIENT)
Dept: CARDIOLOGY CLINIC | Facility: CLINIC | Age: 50
End: 2020-06-12

## 2020-06-15 ENCOUNTER — CONSULT (OUTPATIENT)
Dept: CARDIOLOGY CLINIC | Facility: CLINIC | Age: 50
End: 2020-06-15
Payer: COMMERCIAL

## 2020-06-15 VITALS
HEART RATE: 94 BPM | SYSTOLIC BLOOD PRESSURE: 120 MMHG | DIASTOLIC BLOOD PRESSURE: 78 MMHG | BODY MASS INDEX: 27.31 KG/M2 | WEIGHT: 168.2 LBS | TEMPERATURE: 98.2 F

## 2020-06-15 DIAGNOSIS — R53.83 OTHER FATIGUE: ICD-10-CM

## 2020-06-15 DIAGNOSIS — R07.9 CHEST PAIN, UNSPECIFIED TYPE: Primary | ICD-10-CM

## 2020-06-15 DIAGNOSIS — Z85.71 HISTORY OF HODGKIN'S LYMPHOMA: ICD-10-CM

## 2020-06-15 DIAGNOSIS — R06.02 SHORTNESS OF BREATH: ICD-10-CM

## 2020-06-15 DIAGNOSIS — R01.1 SYSTOLIC MURMUR: ICD-10-CM

## 2020-06-15 PROCEDURE — 99204 OFFICE O/P NEW MOD 45 MIN: CPT

## 2020-06-15 PROCEDURE — 1036F TOBACCO NON-USER: CPT

## 2020-06-15 PROCEDURE — 93000 ELECTROCARDIOGRAM COMPLETE: CPT

## 2020-06-15 RX ORDER — ASPIRIN 81 MG/1
81 TABLET ORAL DAILY
Qty: 90 TABLET | Refills: 3 | Status: SHIPPED | OUTPATIENT
Start: 2020-06-15 | End: 2021-06-15

## 2020-06-29 ENCOUNTER — APPOINTMENT (OUTPATIENT)
Dept: LAB | Facility: HOSPITAL | Age: 50
End: 2020-06-29
Payer: COMMERCIAL

## 2020-06-29 ENCOUNTER — OFFICE VISIT (OUTPATIENT)
Dept: FAMILY MEDICINE CLINIC | Facility: CLINIC | Age: 50
End: 2020-06-29
Payer: COMMERCIAL

## 2020-06-29 VITALS
WEIGHT: 169.2 LBS | OXYGEN SATURATION: 97 % | SYSTOLIC BLOOD PRESSURE: 110 MMHG | HEIGHT: 66 IN | BODY MASS INDEX: 27.19 KG/M2 | HEART RATE: 88 BPM | DIASTOLIC BLOOD PRESSURE: 80 MMHG | TEMPERATURE: 98.2 F

## 2020-06-29 DIAGNOSIS — Z85.71 HISTORY OF HODGKIN'S LYMPHOMA: ICD-10-CM

## 2020-06-29 DIAGNOSIS — E03.8 OTHER SPECIFIED HYPOTHYROIDISM: Primary | ICD-10-CM

## 2020-06-29 DIAGNOSIS — E03.8 OTHER SPECIFIED HYPOTHYROIDISM: ICD-10-CM

## 2020-06-29 DIAGNOSIS — R07.89 ATYPICAL CHEST PAIN: ICD-10-CM

## 2020-06-29 LAB
ALBUMIN SERPL BCP-MCNC: 4.4 G/DL (ref 3–5.2)
ALP SERPL-CCNC: 93 U/L (ref 43–122)
ALT SERPL W P-5'-P-CCNC: 22 U/L (ref 9–52)
ANION GAP SERPL CALCULATED.3IONS-SCNC: 7 MMOL/L (ref 5–14)
AST SERPL W P-5'-P-CCNC: 25 U/L (ref 17–59)
BASOPHILS # BLD AUTO: 0.1 THOUSANDS/ΜL (ref 0–0.1)
BASOPHILS NFR BLD AUTO: 1 % (ref 0–1)
BILIRUB SERPL-MCNC: 0.6 MG/DL
BUN SERPL-MCNC: 21 MG/DL (ref 5–25)
CALCIUM SERPL-MCNC: 9.3 MG/DL (ref 8.4–10.2)
CHLORIDE SERPL-SCNC: 102 MMOL/L (ref 97–108)
CO2 SERPL-SCNC: 27 MMOL/L (ref 22–30)
CREAT SERPL-MCNC: 0.88 MG/DL (ref 0.7–1.5)
EOSINOPHIL # BLD AUTO: 0.2 THOUSAND/ΜL (ref 0–0.4)
EOSINOPHIL NFR BLD AUTO: 2 % (ref 0–6)
ERYTHROCYTE [DISTWIDTH] IN BLOOD BY AUTOMATED COUNT: 13.5 %
GFR SERPL CREATININE-BSD FRML MDRD: 99 ML/MIN/1.73SQ M
GLUCOSE P FAST SERPL-MCNC: 99 MG/DL (ref 70–99)
HCT VFR BLD AUTO: 42.8 % (ref 41–53)
HGB BLD-MCNC: 14.8 G/DL (ref 13.5–17.5)
LDH SERPL-CCNC: 374 U/L (ref 313–618)
LYMPHOCYTES # BLD AUTO: 1.5 THOUSANDS/ΜL (ref 0.5–4)
LYMPHOCYTES NFR BLD AUTO: 19 % (ref 25–45)
MCH RBC QN AUTO: 32.2 PG (ref 26–34)
MCHC RBC AUTO-ENTMCNC: 34.7 G/DL (ref 31–36)
MCV RBC AUTO: 93 FL (ref 80–100)
MONOCYTES # BLD AUTO: 0.9 THOUSAND/ΜL (ref 0.2–0.9)
MONOCYTES NFR BLD AUTO: 11 % (ref 1–10)
NEUTROPHILS # BLD AUTO: 5.3 THOUSANDS/ΜL (ref 1.8–7.8)
NEUTS SEG NFR BLD AUTO: 67 % (ref 45–65)
PLATELET # BLD AUTO: 285 THOUSANDS/UL (ref 150–450)
PMV BLD AUTO: 9.4 FL (ref 8.9–12.7)
POTASSIUM SERPL-SCNC: 4.7 MMOL/L (ref 3.6–5)
PROT SERPL-MCNC: 7.8 G/DL (ref 5.9–8.4)
RBC # BLD AUTO: 4.61 MILLION/UL (ref 4.5–5.9)
SODIUM SERPL-SCNC: 136 MMOL/L (ref 137–147)
TSH SERPL DL<=0.05 MIU/L-ACNC: 1.78 UIU/ML (ref 0.47–4.68)
WBC # BLD AUTO: 7.9 THOUSAND/UL (ref 4.5–11)

## 2020-06-29 PROCEDURE — 1036F TOBACCO NON-USER: CPT | Performed by: NURSE PRACTITIONER

## 2020-06-29 PROCEDURE — 85025 COMPLETE CBC W/AUTO DIFF WBC: CPT

## 2020-06-29 PROCEDURE — 3008F BODY MASS INDEX DOCD: CPT | Performed by: NURSE PRACTITIONER

## 2020-06-29 PROCEDURE — 80053 COMPREHEN METABOLIC PANEL: CPT

## 2020-06-29 PROCEDURE — 84443 ASSAY THYROID STIM HORMONE: CPT

## 2020-06-29 PROCEDURE — 99214 OFFICE O/P EST MOD 30 MIN: CPT | Performed by: NURSE PRACTITIONER

## 2020-06-29 PROCEDURE — 36415 COLL VENOUS BLD VENIPUNCTURE: CPT

## 2020-06-29 PROCEDURE — 83615 LACTATE (LD) (LDH) ENZYME: CPT

## 2020-07-06 ENCOUNTER — TELEPHONE (OUTPATIENT)
Dept: PSYCHIATRY | Facility: CLINIC | Age: 50
End: 2020-07-06

## 2020-07-06 NOTE — TELEPHONE ENCOUNTER
Behavorial Health Outpatient Intake Questions    Referred by:  W Timi Malone    Please advised interviewee that they need to answer all questions truthfully to allow for best care and any misrepresentations of information may affect their ability to be seen at this clinic   => Was this discussed? Yes     Behavorial Health Outpatient Intake History -     Presenting Problem (in patient's words): He did go to Psychiatry in the past and wants to re-establish care  Has Depression and was told he has Schizophrenia but wants to confirm  Are there any developmental disabilities? ? If yes, can they speak to you on the phone? If they are too limited to speak to you on phone, refer out No    Are you taking any psychiatric medications? No    => If yes, who prescribes? If yes, are they injectable medications? Does the patient have a language barrier or hearing impairment? Yes Uzbek Speaking    Have you been treated at [facility] by a therapist or a doctor in the past? If yes, who? Yes - Doesn't remember     Has the patient been hospitalized for mental health? Yes   If yes, how long ago was last hospitalization and where was it? 2 years ago - [facility]     Do you actively use alcohol or marijuana or illegal substances? If yes, what and how much - refer out to Drug and alcohol treatment if use is excessive or daily use of illegal substances No concerns of substance abuse are reported  Do you have a community treatment team or ? No    Legal History-     Does the patient have any history of arrests, assisted/senior care time, or DUIs? No  If Yes-  1) What types of charges? 2) When were they last incarcerated? 3) Are they currently on parole or probation? Minor Child-    Who has custody of the child? N/A    Is there a custody agreement? N/A    If there is a custody agreement remind parent that they must bring a copy to the first appt or they will not be seen  Intake Team, please check with provider before scheduling if flags come up such as:  - complex case  - legal history (other than DUI)  - communication barrier concerns are present  - if, in your judgment, this needs further review    ACCEPTED as a patient Yes  => Appointment Date: Wednesday, August 5th at 8:30AM with Dr Wilma Velarde? No    Name of Insurance Co: [address] [place] [address] ID# [id number]  [id number] Phone #  If ins is primary or secondary  If patient is a minor, parents information such as Name, D  O B of guarantor

## 2020-08-05 ENCOUNTER — OFFICE VISIT (OUTPATIENT)
Dept: PSYCHIATRY | Facility: CLINIC | Age: 50
End: 2020-08-05
Payer: COMMERCIAL

## 2020-08-05 DIAGNOSIS — F39 MOOD DISORDER WITH PSYCHOSIS (HCC): ICD-10-CM

## 2020-08-05 DIAGNOSIS — F41.1 GENERALIZED ANXIETY DISORDER: Primary | ICD-10-CM

## 2020-08-05 DIAGNOSIS — F32.4 MAJOR DEPRESSIVE DISORDER WITH SINGLE EPISODE, IN PARTIAL REMISSION (HCC): ICD-10-CM

## 2020-08-05 PROCEDURE — 90791 PSYCH DIAGNOSTIC EVALUATION: CPT | Performed by: PSYCHIATRY & NEUROLOGY

## 2020-08-05 RX ORDER — ESCITALOPRAM OXALATE 10 MG/1
TABLET ORAL
Qty: 30 TABLET | Refills: 1 | Status: ON HOLD | OUTPATIENT
Start: 2020-08-05 | End: 2022-02-25 | Stop reason: SDUPTHER

## 2020-08-05 NOTE — PSYCH
Psychiatric Evaluation - Behavioral Health     Identification Data:Fabrizio Owusu 45 y o  male MRN: [id number]    Chief Complaint: "I am anxious"  History of present illness:  Patient is a 44 yo male who has a history of treatment for depression and was on Celexa and trazodone in the past  He says he was diagnosed with Schizophrenia in the past, although he does not think he has that and denies any of the symptoms, even in the past  He has been treated for depression and anxiety symptoms off and on during his life and had multiple hospitalizations in this area, since age 31  Currently he denies feeling depressed, but feels anxious a couple of times per month, for several day on occasion  He seems to report that he worries excessively, feels tense, edgy, restless, panicky, poor sleep, poor concentration  During these times he seems to stay home and isolates himself, his energy level is low, he has little interest in doing things, his appetite is low  He denies any feelings of hopelessness or thoughts of death  He says that anxiety has been worse and has been present longer than depression  He has had some problems with memory, for many years, it indicates that this does not effect his functioning  He denies any manic or psychotic symptoms  He denies any OCD, PTSD symptoms  Psychiatric Review Of Systems:  Change in sleep: yes  Appetite changes: yes  Weight changes: no  Change in energy/anergy: yes  Change in interest/pleasure/anhedonia: yes  Somatic symptoms: yes  Anxiety/panic: yes  Manic symptoms: no  Guilt feelings:no  Hopeless: no  Self injurious behavior/risky behavior: no    Historical Information     Past Psychiatric History:   He has been hospitalized about 10 times during the past few decades, initially at age 31, the last time 3 years ago  All of them in the [facility], mostly here in [facility]  He has been only on Celexa in the past and Trazodone  He does not remember any of his other meds   He was getting his meds in nearby [facility]  Substance Abuse History:  He denies    Past Medical History   Hypothyroidism    Family Psychiatric History: Mother had depression, brother had depression,  from medication overdose  Social History:  Developmental: born and raised in [place]  Education: high school diploma/GED  Marital history:  x 10 years, has no children  Living arrangement, social support: wife  Occupational History: on permanent disability, 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[employer], other similar jobs  Access to firearms: none       ALLERGIES   NKDA  Mental Status Evaluation:  Appearance:  {Adequate hygiene and grooming   Behavior:  cooperative, friendly and restless and fidgety   Speech:   Language Normal rate and Normal volume  Able to name objects and Able to repeat phrases   Mood:  anxious and depressed   Affect: Thought process constricted  Goal directed and coherent   Associations: Tightly connected   Thought Content:  Does not verbalize delusional material   Perceptual Disturbances: Denies hallucinations and does not appear to be responding to internal stimuli   Risk Potential: No suicidal or homicidal ideation   Orientation  Oriented x 3   Memory Long term memory intact, short term memory impaired, 1/3 words recalled after a few minutes   Attention/Concentration attention span and concentration impaired, unable to spell 5 letter word backwards      Fund of knowledge aware of current events, Aware of past history and vocabulary Average   Insight:  Good insight   Judgment: Good judgment   Gait/Station: normal gait/station   Motor Activity: No abnormal movement noted             Assessment/Plan   Diagnoses and all orders for this visit:    Generalized anxiety disorder  -     escitalopram (LEXAPRO) 10 mg tablet; 1/2 tab qam x 5 days then 1 tab qam     Mood disorder with psychosis ([facility] [address] )  -     Ambulatory referral to Psychiatry  -     escitalopram (LEXAPRO) 10 mg tablet; 1/2 tab qam x 5 days then 1 tab qam     Major depressive disorder with single episode, in partial remission (HCC)        Plan:   Gave 2 months meds, follow up in 6 weeks  Refer for therapy, Divehi speaking person  Risks, benefits and possible side effects of Medications:   Risks, benefits, and possible side effects of medications explained to patient and patient verbalizes understanding

## 2020-08-05 NOTE — BH TREATMENT PLAN
TREATMENT PLAN (Medication Management Only)        [facility]    Name and Date of Birth:  Crystal Hayes 45 y o  1970  Date of Treatment Plan: August 5, 2020  Diagnosis/Diagnoses:    1  Generalized anxiety disorder    2  Mood disorder with psychosis ([facility] [address] )    3  Major depressive disorder with single episode, in partial remission [facility])      Strengths/Personal Resources for Self-Care: supportive family, taking medications as prescribed, ability to communicate needs, ability to understand psychiatric illness  Area/Areas of need (in own words): anxiety, depression  1  Long Term Goal: alleviate anxiety  Target Date: 2 months - 10/5/2020  Person/Persons responsible for completion of goal: Fabrizio  2  Short Term Objective (s) - How will we reach this goal?:   A  Provider new recommended medication/dosage changes and/or continue medication(s): continue current medications as prescribed Lexapro  B  exercise, diet watching, word games  C  music, meditation, therapy  Target Date: 6 months - 2/5/2021  Person/Persons Responsible for Completion of Goal: Fabrizio  Progress Towards Goals: starting treatment  Treatment Modality: medication management every 6 weeks  Review due 90 to 120 days from date of this plan: 4 months - 12/5/2020  Expected length of service: over 1 year  My Physician/PA/NP and I have developed this plan together and I agree to work on the goals and objectives  I understand the treatment goals that were developed for my treatment

## 2020-08-12 ENCOUNTER — OFFICE VISIT (OUTPATIENT)
Dept: HEMATOLOGY ONCOLOGY | Facility: CLINIC | Age: 50
End: 2020-08-12
Payer: COMMERCIAL

## 2020-08-12 VITALS
OXYGEN SATURATION: 98 % | RESPIRATION RATE: 16 BRPM | TEMPERATURE: 97.5 F | HEIGHT: 65 IN | DIASTOLIC BLOOD PRESSURE: 80 MMHG | SYSTOLIC BLOOD PRESSURE: 130 MMHG | HEART RATE: 85 BPM | BODY MASS INDEX: 28.12 KG/M2 | WEIGHT: 168.8 LBS

## 2020-08-12 DIAGNOSIS — Z85.71 HISTORY OF HODGKIN'S LYMPHOMA: Primary | ICD-10-CM

## 2020-08-12 PROCEDURE — 3008F BODY MASS INDEX DOCD: CPT | Performed by: INTERNAL MEDICINE

## 2020-08-12 PROCEDURE — 99213 OFFICE O/P EST LOW 20 MIN: CPT | Performed by: INTERNAL MEDICINE

## 2020-08-12 PROCEDURE — 1036F TOBACCO NON-USER: CPT | Performed by: INTERNAL MEDICINE

## 2020-08-12 NOTE — PROGRESS NOTES
Hematology/Oncology Outpatient Follow-up  Nikolai Bennett 45 y o  male 1970 [id number]    Date:  8/12/2020        Assessment and Plan:  1  History of Hodgkin's lymphoma  The diagnosis of Hodgkin's lymphoma was more than 25 years ago  He is cured from that diagnosis  At this point it would be appropriate to continue with surveillance for long-term side effects  I did advise him to follow-up with the Cardiology team and consider doing evaluation to rule out the coronary artery disease  He will also need to get his screening colonoscopy since he is 45years old which will be ordered prior to his next visit in 6 months  TSH needs to be monitored closely  - CBC and differential; Future  - Comprehensive metabolic panel; Future  - LD,Blood; Future  - Ambulatory referral to Gastroenterology; Future  - TSH, 3rd generation with Free T4 reflex; Future        HPI:  This is a 79-year-old  gentleman with history of Hodgkin's lymphoma which was diagnosed around 45  The patient stated that he was treated at [facility] with chemotherapy and radiation to the neck and central chest region around the time of the diagnosis  He was then followed by his oncologist on regular basis until recently  He came in today to establish oncological follow-up  The patient denies any constitutional symptoms  He did however complain about dyspnea on exertion and occasional tachycardia  He seems to be on Synthroid for his hypothyroidism which is most likely late side effect from the radiation treatment of the neck region  Interval history:  The patient came today for a follow-up visit  He was supposed to get further evaluation by the Cardiology team with the stress test which he missed for unknown reason  His blood work on 06/29 showed normal LDH with normal CBC and CMP  His TSH is back to normal at 1 7  His stools were negative for occult blood  He denied significant constitutional symptoms     ROS: Review of Systems   Constitutional: Negative for appetite change, diaphoresis, fatigue and fever  HENT: Negative for congestion, dental problem, facial swelling, hearing loss, tinnitus and trouble swallowing  Eyes: Negative for visual disturbance  Respiratory: Positive for shortness of breath  Negative for cough, chest tightness and wheezing  Cardiovascular: Negative for chest pain and leg swelling  Gastrointestinal: Negative for abdominal distention, abdominal pain, blood in stool, constipation, diarrhea, nausea and vomiting  Genitourinary: Negative for dysuria, hematuria and urgency  Musculoskeletal: Negative for arthralgias, myalgias and neck pain  Skin: Negative  Negative for color change, pallor, rash and wound  Neurological: Positive for headaches  Negative for dizziness, weakness and numbness  Hematological: Negative for adenopathy  Psychiatric/Behavioral: Negative for agitation, behavioral problems, confusion, hallucinations, self-injury and sleep disturbance  The patient is not nervous/anxious and is not hyperactive          Past Medical History:   Diagnosis Date    [facility]     Depression     Disease of thyroid gland     Psychiatric disorder        Past Surgical History:   Procedure Laterality Date    HERNIA REPAIR         Social History     Socioeconomic History    Marital status: /Civil Union     Spouse name: None    Number of children: None    Years of education: None    Highest education level: None   Occupational History    None   Social Needs    Financial resource strain: None    Food insecurity     Worry: None     Inability: None    Transportation needs     Medical: None     Non-medical: None   Tobacco Use    Smoking status: Never Smoker    Smokeless tobacco: Never Used   Substance and Sexual Activity    Alcohol use: No    Drug use: No    Sexual activity: Not Currently   Lifestyle    Physical activity     Days per week: None     Minutes per session: None    Stress: None   Relationships    Social connections     Talks on phone: None     Gets together: None     Attends Yarsanism service: None     Active member of club or organization: None     Attends meetings of clubs or organizations: None     Relationship status: None    Intimate partner violence     Fear of current or ex partner: None     Emotionally abused: None     Physically abused: None     Forced sexual activity: None   Other Topics Concern    None   Social History Narrative    None       History reviewed  No pertinent family history  No Known Allergies      Current Outpatient Medications:     aspirin (ECOTRIN LOW STRENGTH) 81 mg EC tablet, Take 1 tablet (81 mg total) by mouth daily, Disp: 90 tablet, Rfl: 3    escitalopram (LEXAPRO) 10 mg tablet, 1/2 tab qam x 5 days then 1 tab qam , Disp: 30 tablet, Rfl: 1    levothyroxine 75 mcg tablet, Take 1 tablet (75 mcg total) by mouth daily in the early morning, Disp: 30 tablet, Rfl: 5      Physical Exam:  /80 (BP Location: Left arm, Patient Position: Sitting, Cuff Size: Adult)   Pulse 85   Temp 97 5 °F (36 4 °C) (Tympanic)   Resp 16   Ht 5' 5" (1 651 m)   Wt 76 6 kg (168 lb 12 8 oz)   SpO2 98%   BMI 28 09 kg/m²     Physical Exam  Constitutional:       Appearance: He is well-developed  HENT:      Head: Normocephalic and atraumatic  Nose: Nose normal    Eyes:      General: No scleral icterus  Right eye: No discharge  Left eye: No discharge  Conjunctiva/sclera: Conjunctivae normal       Pupils: Pupils are equal, round, and reactive to light  Neck:      Musculoskeletal: Normal range of motion and neck supple  Thyroid: No thyromegaly  Trachea: No tracheal deviation  Cardiovascular:      Rate and Rhythm: Normal rate and regular rhythm  Heart sounds: Normal heart sounds  No murmur  No friction rub  Pulmonary:      Effort: Pulmonary effort is normal  No respiratory distress        Breath sounds: Normal breath sounds  No wheezing or rales  Chest:      Chest wall: No tenderness  Abdominal:      General: Bowel sounds are normal  There is no distension  Palpations: Abdomen is soft  There is no hepatomegaly, splenomegaly or mass  Tenderness: There is no abdominal tenderness  There is no guarding or rebound  Musculoskeletal: Normal range of motion  General: No tenderness or deformity  Lymphadenopathy:      Cervical: No cervical adenopathy  Skin:     General: Skin is warm and dry  Coloration: Skin is not pale  Findings: No erythema or rash  Neurological:      Mental Status: He is alert and oriented to person, place, and time  Cranial Nerves: No cranial nerve deficit  Coordination: Coordination normal       Deep Tendon Reflexes: Reflexes are normal and symmetric  Reflexes normal    Psychiatric:         Behavior: Behavior normal          Thought Content: Thought content normal          Judgment: Judgment normal            Labs:  Lab Results   Component Value Date    WBC 7 90 06/29/2020    HGB 14 8 06/29/2020    HCT 42 8 06/29/2020    MCV 93 06/29/2020     06/29/2020     Lab Results   Component Value Date    K 4 7 06/29/2020     06/29/2020    CO2 27 06/29/2020    BUN 21 06/29/2020    CREATININE 0 88 06/29/2020    GLUF 99 06/29/2020    CALCIUM 9 3 06/29/2020    AST 25 06/29/2020    ALT 22 06/29/2020    ALKPHOS 93 06/29/2020    EGFR 99 06/29/2020     No results found for: TSH    Patient voiced understanding and agreement in the above discussion  Aware to contact our office with questions/symptoms in the interim

## 2020-09-04 DIAGNOSIS — E03.8 OTHER SPECIFIED HYPOTHYROIDISM: ICD-10-CM

## 2020-09-08 RX ORDER — LEVOTHYROXINE SODIUM 0.07 MG/1
75 TABLET ORAL
Qty: 30 TABLET | Refills: 5 | Status: SHIPPED | OUTPATIENT
Start: 2020-09-08 | End: 2021-02-04

## 2020-09-21 ENCOUNTER — OFFICE VISIT (OUTPATIENT)
Dept: PSYCHIATRY | Facility: CLINIC | Age: 50
End: 2020-09-21
Payer: COMMERCIAL

## 2020-09-21 DIAGNOSIS — F41.1 GENERALIZED ANXIETY DISORDER: Primary | ICD-10-CM

## 2020-09-21 DIAGNOSIS — F32.4 MAJOR DEPRESSIVE DISORDER WITH SINGLE EPISODE, IN PARTIAL REMISSION (HCC): ICD-10-CM

## 2020-09-21 PROCEDURE — 90833 PSYTX W PT W E/M 30 MIN: CPT | Performed by: PSYCHIATRY & NEUROLOGY

## 2020-09-21 PROCEDURE — 99213 OFFICE O/P EST LOW 20 MIN: CPT | Performed by: PSYCHIATRY & NEUROLOGY

## 2020-09-21 RX ORDER — HYDROXYZINE HYDROCHLORIDE 25 MG/1
TABLET, FILM COATED ORAL
Qty: 45 TABLET | Refills: 1 | Status: SHIPPED | OUTPATIENT
Start: 2020-09-21 | End: 2022-02-25 | Stop reason: HOSPADM

## 2020-09-21 NOTE — PSYCH
Subjective:     Patient ID: Ciara Glasgow is a 45 y o  male  HPI:   Patient seen for follow up, he has not been taking Lexapro 10 mg daily, he never took the medication, although he filled it  He says he has been feeling better, has been going with his nephew to [place], [place], [place], [place] who drives an over the road truck, for 1 week at a time  Some weeks he goes with the nephew  Some weeks not  Wife doesn't mind him going  He denies any recent anxiety or depression  Denies hopelessness or thoughts of death  He denies manic or psychotic symptoms  Not seeing a therapist anymore, needs Albanian speaking one, had gone to [facility] previously  Discussed coping skills ( religous affiliation, family), in regards to communication  ROS Appetite Changes and Sleep: normal appetite, normal energy level and normal number of sleep hours    Review Of Systems:     Mood Anxiety   Behavior Normal    Thought Content Normal   General Normal    Personality Normal   Other Psych Symptoms Normal   Constitutional Normal   ENT Normal   Cardiovascular Normal    Respiratory Normal    Gastrointestinal Normal   Genitourinary Normal    Musculoskeletal Negative   Integumentary Normal    Neurological Normal    Endocrine Normal    Other Symptoms Normal              Laboratory Results: No results found for this or any previous visit  Substance Abuse History:   Social History     Substance and Sexual Activity   Drug Use No       Family Psychiatric History: No family history on file      The following portions of the patient's history were reviewed and updated as appropriate: allergies, current medications, past family history, past medical history, past social history, past surgical history and problem list     Social History     Socioeconomic History    Marital status: /Civil Union     Spouse name: Not on file    Number of children: Not on file    Years of education: Not on file    Highest education level: Not on file   Occupational History    Not on file   Social Needs    Financial resource strain: Not on file    Food insecurity     Worry: Not on file     Inability: Not on file    Transportation needs     Medical: Not on file     Non-medical: Not on file   Tobacco Use    Smoking status: Never Smoker    Smokeless tobacco: Never Used   Substance and Sexual Activity    Alcohol use: No    Drug use: No    Sexual activity: Not Currently   Lifestyle    Physical activity     Days per week: Not on file     Minutes per session: Not on file    Stress: Not on file   Relationships    Social connections     Talks on phone: Not on file     Gets together: Not on file     Attends Voodoo service: Not on file     Active member of club or organization: Not on file     Attends meetings of clubs or organizations: Not on file     Relationship status: Not on file    Intimate partner violence     Fear of current or ex partner: Not on file     Emotionally abused: Not on file     Physically abused: Not on file     Forced sexual activity: Not on file   Other Topics Concern    Not on file   Social History Narrative    Not on file     Social History     Social History Narrative    Not on file         Objective:       Mental status:  Appearance calm and cooperative  and adequate hygiene and grooming   Mood anxious   Affect affect was blunted   Speech a normal rate and speech soft   Thought Processes normal thought processes   Hallucinations no hallucinations present    Thought Content no delusions   Abnormal Thoughts no suicidal thoughts  and no homicidal thoughts    Orientation  oriented to person and place and time   Remote Memory short term memory intact and long term memory intact   Attention Span concentration intact   Intellect Appears to be of Average Intelligence   Insight Insight intact   Judgement judgment was intact   Muscle Strength Muscle strength and tone were normal and Normal gait    Language no difficulty naming common objects, no difficulty repeating a phrase  and no difficulty writing a sentence    Fund of Knowledge displays adequate knowledge of current events, adequate fund of knowledge regarding past history and adequate fund of knowledge regarding vocabulary    Pain none   Pain Scale 0       Assessment/Plan:       Diagnoses and all orders for this visit:    Generalized anxiety disorder  -     hydrOXYzine HCL (ATARAX) 25 mg tablet; 1 to 2 tabs daily PRN for anxiety    Major depressive disorder with single episode, in partial remission ([facility] [address] )            Treatment Recommendations- Risks Benefits    Gave new script for hydroxyzine to take as needed, since he does not seem to want to take anything daily  Immediate Medical/Psychiatric/Psychotherapy Treatments and Any Precautions: not on meds, not seeing therapist currently, gave number of [employer] in [facility] for [address] speaking services  Psychotherapy Provided: Individual psychotherapy provided  Goals discussed in session: social contacts, other coping skills dicussed  Counseling provided: 20 minutes

## 2020-09-28 DIAGNOSIS — F39 MOOD DISORDER WITH PSYCHOSIS (HCC): ICD-10-CM

## 2020-09-28 DIAGNOSIS — F41.1 GENERALIZED ANXIETY DISORDER: ICD-10-CM

## 2020-09-29 ENCOUNTER — OFFICE VISIT (OUTPATIENT)
Dept: FAMILY MEDICINE CLINIC | Facility: CLINIC | Age: 50
End: 2020-09-29
Payer: COMMERCIAL

## 2020-09-29 VITALS
OXYGEN SATURATION: 98 % | RESPIRATION RATE: 20 BRPM | HEIGHT: 65 IN | HEART RATE: 110 BPM | BODY MASS INDEX: 28.32 KG/M2 | TEMPERATURE: 98.7 F | SYSTOLIC BLOOD PRESSURE: 110 MMHG | WEIGHT: 170 LBS | DIASTOLIC BLOOD PRESSURE: 82 MMHG

## 2020-09-29 DIAGNOSIS — E03.8 OTHER SPECIFIED HYPOTHYROIDISM: Primary | ICD-10-CM

## 2020-09-29 DIAGNOSIS — M79.672 PAIN OF LEFT HEEL: ICD-10-CM

## 2020-09-29 DIAGNOSIS — Z23 NEEDS FLU SHOT: ICD-10-CM

## 2020-09-29 DIAGNOSIS — B35.1 TOENAIL FUNGUS: ICD-10-CM

## 2020-09-29 PROCEDURE — 99214 OFFICE O/P EST MOD 30 MIN: CPT | Performed by: NURSE PRACTITIONER

## 2020-09-29 PROCEDURE — 90682 RIV4 VACC RECOMBINANT DNA IM: CPT | Performed by: NURSE PRACTITIONER

## 2020-09-29 PROCEDURE — G0008 ADMIN INFLUENZA VIRUS VAC: HCPCS | Performed by: NURSE PRACTITIONER

## 2020-09-29 RX ORDER — ESCITALOPRAM OXALATE 10 MG/1
TABLET ORAL
Qty: 30 TABLET | Refills: 0 | OUTPATIENT
Start: 2020-09-29

## 2020-09-29 NOTE — PROGRESS NOTES
BMI Counseling: Body mass index is 28 29 kg/m²  The BMI is above normal  Nutrition recommendations include encouraging healthy choices of fruits and vegetables, decreasing fast food intake, consuming healthier snacks, limiting drinks that contain sugar, increasing intake of lean protein, reducing intake of saturated and trans fat and reducing intake of cholesterol  Exercise recommendations include moderate physical activity 150 minutes/week  Depression Screening and Follow-up Plan: Clincally patient does not have depression  No treatment is required  Assessment/Plan:    Other specified hypothyroidism  -Discussed with patient that the goal of treatment is reduction of symptoms and prevention of long term complications  Treatment is usually given upon establishing the diagnosis and is lifelong  Discussed with patient that there is no evidence that "natural" or pig thyroid extract provides clinically reliable therapy  Also discussed with patient the main complication of over-replacement of thyroid hormone Is increased risk of osteoporosis and a-fib  We discussed increasing or decreasing in small increment every 8 weeks to normalize TSH level  Once we receive at least 2 normal TSH levels we will monitor TSH level every 6-12 months     -Pt to have TSH labs done and continue on levothyroxine    Pain of left heel  The goal of treatment is complete pain relief  This often requires many months of therapy ranging from 4 to 18 months of conservative treatment to achieve goal  I am recommending a combination of rest, weight reduction and foot insoles as well as NSAIDs or Tyelnol  I advised to continue stretching the tendoachilles and plantar fascia  If pain is persistent despite exercise discussed I am recommending patient get steroid injections to site but patient refused at this time  To follow up if s/s do not improve          Diagnoses and all orders for this visit:    Other specified hypothyroidism    Pain of left heel    Toenail fungus  -     Ambulatory referral to Podiatry; Future    Needs flu shot  -     influenza vaccine, quadrivalent, recombinant, PF, 0 5 mL, for patients 18 yr+ (FLUBLOK)          Subjective:      Patient ID: Pedro Pablo Hernandez is a 45 y o  male  Patient presents with: Follow-up  Hypothyroidism    45year old male patient here for follow up for thyroid and heel pain  States he has an appointment for follow up with cardiology for 2D echo and stress echo to be done per patient  Taking baby aspirin as ordered  Thyroid Problem   Presents for follow-up visit  Patient reports no anxiety, constipation, depressed mood, dry skin, fatigue, hair loss, heat intolerance, hoarse voice, palpitations, weight gain or weight loss  The symptoms have been stable  Leg Pain    The incident occurred more than 1 week ago (2 years ago)  There was no injury mechanism  The pain is present in the left heel  The quality of the pain is described as aching  The pain is at a severity of 3/10  The pain is mild  The pain has been fluctuating since onset  Pertinent negatives include no inability to bear weight, loss of motion, numbness or tingling  He reports no foreign bodies present  The symptoms are aggravated by movement and weight bearing  He has tried rest for the symptoms  The treatment provided significant relief  The following portions of the patient's history were reviewed and updated as appropriate: allergies, current medications, past family history, past medical history, past social history, past surgical history and problem list     Review of Systems   Constitutional: Negative  Negative for appetite change, fatigue, fever, weight gain and weight loss  HENT: Negative  Negative for hoarse voice  Eyes: Negative  Respiratory: Negative  Negative for cough, chest tightness, shortness of breath and wheezing  Cardiovascular: Negative  Negative for chest pain and palpitations  Gastrointestinal: Negative  Negative for constipation  Endocrine: Negative  Negative for heat intolerance  Genitourinary: Negative  Musculoskeletal: Positive for joint swelling (left heel pain)  Negative for arthralgias  Skin: Negative  Negative for color change and rash  Allergic/Immunologic: Negative  Neurological: Negative  Negative for dizziness, tingling, numbness and headaches  Hematological: Negative  Psychiatric/Behavioral: Negative  The patient is not nervous/anxious  Objective:      /82 (BP Location: Left arm, Patient Position: Sitting, Cuff Size: Standard)   Pulse (!) 110   Temp 98 7 °F (37 1 °C) (Temporal)   Resp 20   Ht 5' 5" (1 651 m)   Wt 77 1 kg (170 lb)   SpO2 98%   BMI 28 29 kg/m²          Physical Exam  Vitals signs and nursing note reviewed  Constitutional:       General: He is not in acute distress  Appearance: Normal appearance  He is not ill-appearing, toxic-appearing or diaphoretic  HENT:      Head: Normocephalic and atraumatic  Right Ear: External ear normal       Left Ear: External ear normal       Nose: Nose normal  No congestion or rhinorrhea  Mouth/Throat:      Mouth: Mucous membranes are moist       Pharynx: Oropharynx is clear  No oropharyngeal exudate  Eyes:      Extraocular Movements: Extraocular movements intact  Conjunctiva/sclera: Conjunctivae normal       Pupils: Pupils are equal, round, and reactive to light  Neck:      Musculoskeletal: Normal range of motion and neck supple  Cardiovascular:      Rate and Rhythm: Normal rate and regular rhythm  Pulses: Normal pulses  Heart sounds: Normal heart sounds  Pulmonary:      Effort: Pulmonary effort is normal  No respiratory distress  Breath sounds: Normal breath sounds  Abdominal:      General: Bowel sounds are normal       Palpations: Abdomen is soft  Musculoskeletal: Normal range of motion  General: Tenderness (left heel pain) present     Skin:     General: Skin is warm and dry  Capillary Refill: Capillary refill takes less than 2 seconds  Neurological:      General: No focal deficit present  Mental Status: He is alert and oriented to person, place, and time     Psychiatric:         Mood and Affect: Mood normal          Behavior: Behavior normal

## 2020-09-29 NOTE — ASSESSMENT & PLAN NOTE
The goal of treatment is complete pain relief  This often requires many months of therapy ranging from 4 to 18 months of conservative treatment to achieve goal  I am recommending a combination of rest, weight reduction and foot insoles as well as NSAIDs or Tyelnol  I advised to continue stretching the tendoachilles and plantar fascia  If pain is persistent despite exercise discussed I am recommending patient get steroid injections to site but patient refused at this time  To follow up if s/s do not improve

## 2020-09-29 NOTE — ASSESSMENT & PLAN NOTE
-Discussed with patient that the goal of treatment is reduction of symptoms and prevention of long term complications  Treatment is usually given upon establishing the diagnosis and is lifelong  Discussed with patient that there is no evidence that "natural" or pig thyroid extract provides clinically reliable therapy  Also discussed with patient the main complication of over-replacement of thyroid hormone Is increased risk of osteoporosis and a-fib  We discussed increasing or decreasing in small increment every 8 weeks to normalize TSH level   Once we receive at least 2 normal TSH levels we will monitor TSH level every 6-12 months     -Pt to have TSH labs done and continue on levothyroxine

## 2020-10-01 ENCOUNTER — HOSPITAL ENCOUNTER (OUTPATIENT)
Dept: RADIOLOGY | Facility: HOSPITAL | Age: 50
Discharge: HOME | End: 2020-10-01
Attending: PODIATRIST
Payer: COMMERCIAL

## 2020-10-01 ENCOUNTER — TRANSCRIBE ORDERS (OUTPATIENT)
Dept: ADMINISTRATIVE | Facility: HOSPITAL | Age: 50
End: 2020-10-01

## 2020-10-01 DIAGNOSIS — M21.41 FLAT FEET: Primary | ICD-10-CM

## 2020-10-01 DIAGNOSIS — M21.42 FLAT FEET: ICD-10-CM

## 2020-10-01 DIAGNOSIS — M21.42 FLAT FEET: Primary | ICD-10-CM

## 2020-10-01 DIAGNOSIS — M21.41 FLAT FEET: ICD-10-CM

## 2020-10-01 PROCEDURE — 73630 X-RAY EXAM OF FOOT: CPT

## 2020-10-12 ENCOUNTER — HOSPITAL ENCOUNTER (OUTPATIENT)
Dept: NON INVASIVE DIAGNOSTICS | Facility: HOSPITAL | Age: 50
Discharge: HOME | End: 2020-10-12
Payer: COMMERCIAL

## 2020-10-12 DIAGNOSIS — R06.02 SHORTNESS OF BREATH: ICD-10-CM

## 2020-10-12 DIAGNOSIS — R53.83 OTHER FATIGUE: ICD-10-CM

## 2020-10-12 DIAGNOSIS — R07.9 CHEST PAIN, UNSPECIFIED TYPE: ICD-10-CM

## 2020-10-12 DIAGNOSIS — R01.1 SYSTOLIC MURMUR: ICD-10-CM

## 2020-10-12 PROCEDURE — 93306 TTE W/DOPPLER COMPLETE: CPT

## 2020-10-12 PROCEDURE — 93351 STRESS TTE COMPLETE: CPT

## 2020-10-12 PROCEDURE — 93320 DOPPLER ECHO COMPLETE: CPT

## 2020-10-12 PROCEDURE — 93325 DOPPLER ECHO COLOR FLOW MAPG: CPT

## 2020-10-12 PROCEDURE — 93351 STRESS TTE COMPLETE: CPT | Performed by: INTERNAL MEDICINE

## 2020-10-13 LAB
CHEST PAIN STATEMENT: NORMAL
MAX DIASTOLIC BP: 80 MMHG
MAX HEART RATE: 148 BPM
MAX PREDICTED HEART RATE: 169 BPM
MAX. SYSTOLIC BP: 160 MMHG
PROTOCOL NAME: NORMAL
REASON FOR TERMINATION: NORMAL
TARGET HR FORMULA: NORMAL
TEST INDICATION: NORMAL
TIME IN EXERCISE PHASE: NORMAL

## 2020-10-21 ENCOUNTER — OFFICE VISIT (OUTPATIENT)
Dept: CARDIOLOGY CLINIC | Facility: CLINIC | Age: 50
End: 2020-10-21
Payer: COMMERCIAL

## 2020-10-21 VITALS
HEART RATE: 99 BPM | OXYGEN SATURATION: 98 % | TEMPERATURE: 96.6 F | HEIGHT: 65 IN | SYSTOLIC BLOOD PRESSURE: 138 MMHG | DIASTOLIC BLOOD PRESSURE: 80 MMHG | BODY MASS INDEX: 28.22 KG/M2 | WEIGHT: 169.4 LBS

## 2020-10-21 DIAGNOSIS — Z85.71 HISTORY OF HODGKIN'S LYMPHOMA: Primary | ICD-10-CM

## 2020-10-21 DIAGNOSIS — R07.89 ATYPICAL CHEST PAIN: ICD-10-CM

## 2020-10-21 DIAGNOSIS — F41.1 GENERALIZED ANXIETY DISORDER: ICD-10-CM

## 2020-10-21 PROCEDURE — 99214 OFFICE O/P EST MOD 30 MIN: CPT

## 2020-10-21 PROCEDURE — 1036F TOBACCO NON-USER: CPT

## 2020-10-21 RX ORDER — PREDNISONE 10 MG/1
TABLET ORAL
COMMUNITY
Start: 2020-10-15 | End: 2022-02-25 | Stop reason: HOSPADM

## 2021-01-19 ENCOUNTER — TELEPHONE (OUTPATIENT)
Dept: GASTROENTEROLOGY | Facility: MEDICAL CENTER | Age: 51
End: 2021-01-19

## 2021-02-03 DIAGNOSIS — E03.8 OTHER SPECIFIED HYPOTHYROIDISM: ICD-10-CM

## 2021-02-04 ENCOUNTER — LAB (OUTPATIENT)
Dept: LAB | Facility: HOSPITAL | Age: 51
End: 2021-02-04
Attending: INTERNAL MEDICINE
Payer: COMMERCIAL

## 2021-02-04 DIAGNOSIS — Z85.71 HISTORY OF HODGKIN'S LYMPHOMA: ICD-10-CM

## 2021-02-04 DIAGNOSIS — Z12.5 SCREENING FOR PROSTATE CANCER: ICD-10-CM

## 2021-02-04 LAB
ALBUMIN SERPL BCP-MCNC: 4.6 G/DL (ref 3–5.2)
ALP SERPL-CCNC: 97 U/L (ref 43–122)
ALT SERPL W P-5'-P-CCNC: 32 U/L (ref 9–52)
ANION GAP SERPL CALCULATED.3IONS-SCNC: 9 MMOL/L (ref 5–14)
AST SERPL W P-5'-P-CCNC: 45 U/L (ref 17–59)
BASOPHILS # BLD AUTO: 0.1 THOUSANDS/ΜL (ref 0–0.1)
BASOPHILS NFR BLD AUTO: 1 % (ref 0–1)
BILIRUB SERPL-MCNC: 1 MG/DL
BUN SERPL-MCNC: 24 MG/DL (ref 5–25)
CALCIUM SERPL-MCNC: 9.1 MG/DL (ref 8.4–10.2)
CHLORIDE SERPL-SCNC: 105 MMOL/L (ref 97–108)
CO2 SERPL-SCNC: 26 MMOL/L (ref 22–30)
CREAT SERPL-MCNC: 0.85 MG/DL (ref 0.7–1.5)
EOSINOPHIL # BLD AUTO: 0.2 THOUSAND/ΜL (ref 0–0.4)
EOSINOPHIL NFR BLD AUTO: 2 % (ref 0–6)
ERYTHROCYTE [DISTWIDTH] IN BLOOD BY AUTOMATED COUNT: 13.7 %
GFR SERPL CREATININE-BSD FRML MDRD: 101 ML/MIN/1.73SQ M
GLUCOSE P FAST SERPL-MCNC: 94 MG/DL (ref 70–99)
HCT VFR BLD AUTO: 43.7 % (ref 41–53)
HGB BLD-MCNC: 14.4 G/DL (ref 13.5–17.5)
LDH SERPL-CCNC: 552 U/L (ref 313–618)
LYMPHOCYTES # BLD AUTO: 1.5 THOUSANDS/ΜL (ref 0.5–4)
LYMPHOCYTES NFR BLD AUTO: 15 % (ref 25–45)
MCH RBC QN AUTO: 31 PG (ref 26–34)
MCHC RBC AUTO-ENTMCNC: 32.9 G/DL (ref 31–36)
MCV RBC AUTO: 94 FL (ref 80–100)
MONOCYTES # BLD AUTO: 1 THOUSAND/ΜL (ref 0.2–0.9)
MONOCYTES NFR BLD AUTO: 11 % (ref 1–10)
NEUTROPHILS # BLD AUTO: 6.9 THOUSANDS/ΜL (ref 1.8–7.8)
NEUTS SEG NFR BLD AUTO: 71 % (ref 45–65)
PLATELET # BLD AUTO: 293 THOUSANDS/UL (ref 150–450)
PMV BLD AUTO: 9.3 FL (ref 8.9–12.7)
POTASSIUM SERPL-SCNC: 3.7 MMOL/L (ref 3.6–5)
PROT SERPL-MCNC: 8.3 G/DL (ref 5.9–8.4)
PSA SERPL-MCNC: 0.6 NG/ML (ref 0–4)
RBC # BLD AUTO: 4.65 MILLION/UL (ref 4.5–5.9)
SODIUM SERPL-SCNC: 140 MMOL/L (ref 137–147)
TSH SERPL DL<=0.05 MIU/L-ACNC: 3.49 UIU/ML (ref 0.47–4.68)
WBC # BLD AUTO: 9.7 THOUSAND/UL (ref 4.5–11)

## 2021-02-04 PROCEDURE — 83615 LACTATE (LD) (LDH) ENZYME: CPT

## 2021-02-04 PROCEDURE — 85025 COMPLETE CBC W/AUTO DIFF WBC: CPT

## 2021-02-04 PROCEDURE — G0103 PSA SCREENING: HCPCS

## 2021-02-04 PROCEDURE — 36415 COLL VENOUS BLD VENIPUNCTURE: CPT

## 2021-02-04 PROCEDURE — 80053 COMPREHEN METABOLIC PANEL: CPT

## 2021-02-04 PROCEDURE — 84443 ASSAY THYROID STIM HORMONE: CPT

## 2021-02-04 RX ORDER — LEVOTHYROXINE SODIUM 0.07 MG/1
TABLET ORAL
Qty: 30 TABLET | Refills: 4 | Status: SHIPPED | OUTPATIENT
Start: 2021-02-04 | End: 2021-06-08

## 2021-02-11 ENCOUNTER — TELEPHONE (OUTPATIENT)
Dept: HEMATOLOGY ONCOLOGY | Facility: CLINIC | Age: 51
End: 2021-02-11

## 2021-02-12 ENCOUNTER — OFFICE VISIT (OUTPATIENT)
Dept: HEMATOLOGY ONCOLOGY | Facility: CLINIC | Age: 51
End: 2021-02-12
Payer: COMMERCIAL

## 2021-02-12 VITALS
BODY MASS INDEX: 28.96 KG/M2 | RESPIRATION RATE: 18 BRPM | HEIGHT: 65 IN | DIASTOLIC BLOOD PRESSURE: 80 MMHG | HEART RATE: 92 BPM | WEIGHT: 173.8 LBS | SYSTOLIC BLOOD PRESSURE: 130 MMHG | TEMPERATURE: 96.6 F | OXYGEN SATURATION: 97 %

## 2021-02-12 DIAGNOSIS — Z85.71 HISTORY OF HODGKIN'S LYMPHOMA: Primary | ICD-10-CM

## 2021-02-12 PROCEDURE — 3008F BODY MASS INDEX DOCD: CPT | Performed by: NURSE PRACTITIONER

## 2021-02-12 PROCEDURE — 1036F TOBACCO NON-USER: CPT | Performed by: NURSE PRACTITIONER

## 2021-02-12 PROCEDURE — 99214 OFFICE O/P EST MOD 30 MIN: CPT | Performed by: NURSE PRACTITIONER

## 2021-02-12 RX ORDER — DEXAMETHASONE SODIUM PHOSPHATE 1 MG/ML
SOLUTION/ DROPS OPHTHALMIC
COMMUNITY
Start: 2021-01-06 | End: 2022-02-25 | Stop reason: HOSPADM

## 2021-02-12 RX ORDER — PREDNISOLONE ACETATE 10 MG/ML
SUSPENSION/ DROPS OPHTHALMIC
COMMUNITY
End: 2022-02-25 | Stop reason: HOSPADM

## 2021-02-12 RX ORDER — POLYVINYL ALCOHOL 14 MG/ML
SOLUTION/ DROPS OPHTHALMIC
COMMUNITY
Start: 2021-02-08 | End: 2022-02-25 | Stop reason: HOSPADM

## 2021-02-12 NOTE — PROGRESS NOTES
Hematology/Oncology Outpatient Follow-up  Luciano Buck 45 y o  male 1970 [id number]    Date:  2/12/2021      Assessment and Plan:  1  History of Hodgkin's lymphoma  Patient was diagnosed and treated for Hodgkin's lymphoma more than 25 years ago and is cured at this point time  His prior records are not available for review  We will continue to monitor him on an annual basis for survivorship follow-up care  He was advised to continue to follow-up with his cardiologist on a regular basis  Is also overdue for his screening colonoscopy we will call and schedule the appointment with Gastroenterology today for compliance sake  He is agreeable to proceed with the procedure as long as he has a more tolerable prep  We did review pursuing healthier lifestyles after cancer treatment including: following up with PCP on a regular basis for health maintenance/immunizations, getting routine screening exams when indicated, avoiding alcohol, consuming a well-balanced diet with lots of fruits/vegetables/whole grains and limiting greasy/fatty and sugary foods and exercising on a regular basis  - CBC and differential; Future  - Comprehensive metabolic panel; Future  - LD,Blood; Future  - C-reactive protein; Future  - Sedimentation rate, automated; Future  - Lipid panel; Future      HPI:  This is a 48-year-old  gentleman with history of Hodgkin's lymphoma which was diagnosed around 45 (was 21years old)  The patient stated that he was treated at [facility] with chemotherapy and radiation to the neck and central chest region around the time of the diagnosis  Ayannachavez Vernon was then followed by his oncologist on regular basis until recently  He presented to establish oncological follow-up  He seems to be on Synthroid for his hypothyroidism which is most likely late side effect from the radiation treatment of the neck region  Interval history:  Patient presents today for a follow-up visit    He denies any constitutional symptoms  Reports good appetite and energy  He states that he has occasional Small amount of blood with BMs which he feels may be due to do hemorrhoids  Was referred to GI  for screening colonoscopy but does not seem he has been scheduled yet  Reports that he was seen by GI and scheduled for colonoscopy a while back at [facility] but never went through with the procedure as he could not tolerate the prep  He does have chronic dyspnea with exertion  Was evaluated by Cardiology recently who found mild reduced ejection fraction on his echocardiogram with negative stress echo  They recommended aspirin and annual follow-up  His most recent laboratory studies from 02/04/2021 showed normal WBC 9 7 with very mild increase of his monocytes, is not anemic H&H 14 4/43 7, MCV 97 with normal platelet count 262  CMP and LDH are normal   TSH normal 3 48 is on Synthroid 75 mcg daily  PSA normal   His lipid panel March 2020 was normal     ROS: Review of Systems   Constitutional: Negative for activity change, appetite change, chills, fatigue, fever and unexpected weight change  HENT: Positive for trouble swallowing ( minimal)  Negative for congestion, mouth sores, nosebleeds and sore throat  Eyes: Negative  Respiratory: Positive for shortness of breath  Negative for cough and chest tightness  Cardiovascular: Negative for chest pain, palpitations and leg swelling  Gastrointestinal: Negative for abdominal distention, abdominal pain, constipation, diarrhea, nausea and vomiting  Genitourinary: Negative for difficulty urinating, dysuria, frequency, hematuria and urgency  Musculoskeletal: Negative for arthralgias, back pain, gait problem, joint swelling and myalgias  Skin: Negative for color change, pallor and rash  Neurological: Positive for dizziness ( occasional mild) and headaches ( occasional mild)  Negative for weakness, light-headedness and numbness     Hematological: Negative for adenopathy  Does not bruise/bleed easily  Psychiatric/Behavioral: Positive for dysphoric mood and sleep disturbance  Past Medical History:   Diagnosis Date    Cancer ([address] )     Depression     Disease of thyroid gland     Psychiatric disorder        Past Surgical History:   Procedure Laterality Date    HERNIA REPAIR         Social History     Socioeconomic History    Marital status: /Civil Union     Spouse name: None    Number of children: None    Years of education: None    Highest education level: None   Occupational History    None   Social Needs    Financial resource strain: None    Food insecurity     Worry: None     Inability: None    Transportation needs     Medical: None     Non-medical: None   Tobacco Use    Smoking status: Never Smoker    Smokeless tobacco: Never Used   Substance and Sexual Activity    Alcohol use: No    Drug use: No    Sexual activity: Not Currently   Lifestyle    Physical activity     Days per week: None     Minutes per session: None    Stress: None   Relationships    Social connections     Talks on phone: None     Gets together: None     Attends Holiness service: None     Active member of club or organization: None     Attends meetings of clubs or organizations: None     Relationship status: None    Intimate partner violence     Fear of current or ex partner: None     Emotionally abused: None     Physically abused: None     Forced sexual activity: None   Other Topics Concern    None   Social History Narrative    None       No family history on file      No Known Allergies      Current Outpatient Medications:     Artificial Tears 1 4 % ophthalmic solution, , Disp: , Rfl:     aspirin (ECOTRIN LOW STRENGTH) 81 mg EC tablet, Take 1 tablet (81 mg total) by mouth daily, Disp: 90 tablet, Rfl: 3    escitalopram (LEXAPRO) 10 mg tablet, 1/2 tab qam x 5 days then 1 tab qam , Disp: 30 tablet, Rfl: 1    hydrOXYzine HCL (ATARAX) 25 mg tablet, 1 to 2 tabs daily PRN for anxiety, Disp: 45 tablet, Rfl: 1    levothyroxine 75 mcg tablet, TAKE ONE TABLET BY MOUTH ONCE DAILY EARLY IN THE MORNING, Disp: 30 tablet, Rfl: 4    prednisoLONE acetate (PRED FORTE) 1 % ophthalmic suspension, prednisolone acetate 1 % eye drops,suspension, Disp: , Rfl:     dexamethasone sodium phosphate 0 1 % ophthalmic solution, , Disp: , Rfl:     predniSONE 10 mg tablet, , Disp: , Rfl:       Physical Exam:  /80 (BP Location: Left arm, Patient Position: Sitting, Cuff Size: Standard)   Pulse 92   Temp (!) 96 6 °F (35 9 °C) (Tympanic)   Resp 18   Ht 5' 5" (1 651 m)   Wt 78 8 kg (173 lb 12 8 oz)   SpO2 97%   BMI 28 92 kg/m²     Physical Exam  Vitals signs reviewed  Constitutional:       General: He is not in acute distress  Appearance: He is well-developed  He is not diaphoretic  HENT:      Head: Normocephalic and atraumatic  Eyes:      General: Lids are normal  No scleral icterus  Conjunctiva/sclera: Conjunctivae normal       Pupils: Pupils are equal, round, and reactive to light  Neck:      Musculoskeletal: Normal range of motion and neck supple  Thyroid: No thyromegaly  Cardiovascular:      Rate and Rhythm: Normal rate and regular rhythm  Heart sounds: Murmur present  Pulmonary:      Effort: Pulmonary effort is normal  No respiratory distress  Breath sounds: Normal breath sounds  Abdominal:      General: There is no distension  Palpations: Abdomen is soft  There is no hepatomegaly or splenomegaly  Tenderness: There is no abdominal tenderness  Musculoskeletal: Normal range of motion  General: No swelling  Lymphadenopathy:      Cervical: No cervical adenopathy  Upper Body:      Right upper body: No axillary adenopathy  Left upper body: No axillary adenopathy  Skin:     General: Skin is warm and dry  Findings: No erythema or rash  Neurological:      General: No focal deficit present        Mental Status: He is alert and oriented to person, place, and time  Psychiatric:         Mood and Affect: Mood normal  Affect is flat  Behavior: Behavior is slowed  Behavior is cooperative  Thought Content: Thought content normal          Judgment: Judgment normal            Labs:  Lab Results   Component Value Date    WBC 9 70 02/04/2021    HGB 14 4 02/04/2021    HCT 43 7 02/04/2021    MCV 94 02/04/2021     02/04/2021     Lab Results   Component Value Date    K 3 7 02/04/2021     02/04/2021    CO2 26 02/04/2021    BUN 24 02/04/2021    CREATININE 0 85 02/04/2021    GLUF 94 02/04/2021    CALCIUM 9 1 02/04/2021    AST 45 02/04/2021    ALT 32 02/04/2021    ALKPHOS 97 02/04/2021    EGFR 101 02/04/2021       Patient voiced understanding and agreement in the above discussion  Aware to contact our office with questions/symptoms in the interim  This note has been generated by voice recognition software system  Therefore, there may be spelling, grammar, and or syntax errors  Please contact if questions arise

## 2021-04-08 ENCOUNTER — OFFICE VISIT (OUTPATIENT)
Dept: GASTROENTEROLOGY | Facility: MEDICAL CENTER | Age: 51
End: 2021-04-08
Payer: COMMERCIAL

## 2021-04-08 VITALS
TEMPERATURE: 97 F | HEIGHT: 65 IN | WEIGHT: 170 LBS | HEART RATE: 80 BPM | BODY MASS INDEX: 28.32 KG/M2 | SYSTOLIC BLOOD PRESSURE: 130 MMHG | DIASTOLIC BLOOD PRESSURE: 86 MMHG

## 2021-04-08 DIAGNOSIS — Z80.0 FAMILY HISTORY OF COLON CANCER IN FATHER: Primary | ICD-10-CM

## 2021-04-08 DIAGNOSIS — Z85.71 HISTORY OF HODGKIN'S LYMPHOMA: ICD-10-CM

## 2021-04-08 DIAGNOSIS — Z12.11 ENCOUNTER FOR SCREENING COLONOSCOPY: ICD-10-CM

## 2021-04-08 PROCEDURE — 99203 OFFICE O/P NEW LOW 30 MIN: CPT | Performed by: INTERNAL MEDICINE

## 2021-04-08 PROCEDURE — 3008F BODY MASS INDEX DOCD: CPT | Performed by: INTERNAL MEDICINE

## 2021-04-08 PROCEDURE — 1036F TOBACCO NON-USER: CPT | Performed by: INTERNAL MEDICINE

## 2021-04-08 NOTE — PROGRESS NOTES
Outpatient Consultation  [address] [place] [address]  [place]  Ph : [phone]  Fax : [phone]  Mobile : [phone]  Email : [email or web address]  Also available on [employer] Text      Ciara Glasgow 45 y o  male MRN: [id number]    PCP: ELTHA Malin  Referring: Gurpreet Sapp MD  [address]  [address]  [address]    Ciara Glasgow was seen in consultation  My recommendations are included  Please do not hesitate to contact me with any questions you may have  ASSESSMENT AND PLAN:      No problem-specific Assessment & Plan notes found for this encounter  Diagnoses and all orders for this visit:    Family history of colon cancer in father  -     Colonoscopy; Future    History of Hodgkin's lymphoma  -     Ambulatory referral to Gastroenterology    Encounter for screening colonoscopy  -     Colonoscopy; Future    Other orders  -     Diet NPO; Sips with meds; Standing  -     Void on call to OR; Standing  -     Insert peripheral IV; Standing      45 with h/o Hodgkins lymphoma and a higher risk of colon cancer due to family h/o colon cancer in father at age 48  He personally had a h/o colon polyps about 10 years ago  Plan for colonoscopy now and then again every 5 years  Discussed with him and he is agreeable    ______________________________________________________________________    HPI:  46 y/o gentleman who presents for colonoscopy  He has had a hodgkins lymphoma  He has had a history of hemorrhoids many years ago and has had bleeding  He had a colonoscopy done about 10 years ago in [place] and had polyps  He has no diarrhea or constipation  His father had colon cancer (he was in his 52's)  PRIOR ENDOSCOPIC EVALUATION :     Endoscopy : no    Colonoscopy : yes      REVIEW OF SYSTEMS:    CONSTITUTIONAL: Denies any fever, chills, rigors, and weight loss    HEENT: No earache or tinnitus  Denies hearing loss or visual disturbances  CARDIOVASCULAR: No chest pain or palpitations  RESPIRATORY: Denies any cough, hemoptysis, shortness of breath or dyspnea on exertion  GASTROINTESTINAL: As noted in the History of Present Illness  GENITOURINARY: No problems with urination  Denies any hematuria or dysuria  NEUROLOGIC: No dizziness or vertigo, denies headaches  MUSCULOSKELETAL: Denies any muscle or joint pain  SKIN: Denies skin rashes or itching  ENDOCRINE: Denies excessive thirst  Denies intolerance to heat or cold  PSYCHOSOCIAL: Denies depression or anxiety  Denies any recent memory loss  Historical Information   Past Medical History:   Diagnosis Date    Cancer [facility] )     Depression     Disease of thyroid gland     Psychiatric disorder      Past Surgical History:   Procedure Laterality Date    HERNIA REPAIR       Social History   Social History     Substance and Sexual Activity   Alcohol Use No     Social History     Substance and Sexual Activity   Drug Use No     Social History     Tobacco Use   Smoking Status Never Smoker   Smokeless Tobacco Never Used     Family History   Problem Relation Age of Onset    Colon cancer Father        Meds/Allergies       Current Outpatient Medications:     Artificial Tears 1 4 % ophthalmic solution    aspirin (ECOTRIN LOW STRENGTH) 81 mg EC tablet    dexamethasone sodium phosphate 0 1 % ophthalmic solution    escitalopram (LEXAPRO) 10 mg tablet    hydrOXYzine HCL (ATARAX) 25 mg tablet    levothyroxine 75 mcg tablet    prednisoLONE acetate (PRED FORTE) 1 % ophthalmic suspension    predniSONE 10 mg tablet    No Known Allergies        Objective     Blood pressure 130/86, pulse 80, temperature (!) 97 °F (36 1 °C), temperature source Tympanic, height 5' 5" (1 651 m), weight 77 1 kg (170 lb)  Body mass index is 28 29 kg/m²  PHYSICAL EXAM:      Physical Exam  Constitutional:       Appearance: Normal appearance  He is well-developed  HENT:      Head: Normocephalic and atraumatic  Eyes:      General: No scleral icterus  Conjunctiva/sclera: Conjunctivae normal       Pupils: Pupils are equal, round, and reactive to light  Neck:      Musculoskeletal: Normal range of motion  Cardiovascular:      Rate and Rhythm: Normal rate and regular rhythm  Heart sounds: Normal heart sounds  Pulmonary:      Effort: Pulmonary effort is normal  No respiratory distress  Breath sounds: Normal breath sounds  Abdominal:      General: Bowel sounds are normal  There is no distension  Palpations: Abdomen is soft  There is no mass  Tenderness: There is no abdominal tenderness  Hernia: No hernia is present  Musculoskeletal: Normal range of motion  Lymphadenopathy:      Cervical: No cervical adenopathy  Skin:     General: Skin is warm  Neurological:      Mental Status: He is alert and oriented to person, place, and time  Psychiatric:         Behavior: Behavior normal          Thought Content: Thought content normal              Lab Results:     Lab Results   Component Value Date    WBC 9 70 02/04/2021    HGB 14 4 02/04/2021    HCT 43 7 02/04/2021    MCV 94 02/04/2021     02/04/2021       Lab Results   Component Value Date    K 3 7 02/04/2021     02/04/2021    CO2 26 02/04/2021    BUN 24 02/04/2021    CREATININE 0 85 02/04/2021    GLUF 94 02/04/2021    CALCIUM 9 1 02/04/2021    AST 45 02/04/2021    ALT 32 02/04/2021    ALKPHOS 97 02/04/2021    EGFR 101 02/04/2021       No results found for: INR, PROTIME      Radiology Results:   No results found

## 2021-05-11 ENCOUNTER — IMMUNIZATIONS (OUTPATIENT)
Dept: FAMILY MEDICINE CLINIC | Facility: HOSPITAL | Age: 51
End: 2021-05-11

## 2021-05-11 ENCOUNTER — OFFICE VISIT (OUTPATIENT)
Dept: FAMILY MEDICINE CLINIC | Facility: CLINIC | Age: 51
End: 2021-05-11
Payer: COMMERCIAL

## 2021-05-11 VITALS
WEIGHT: 172 LBS | SYSTOLIC BLOOD PRESSURE: 130 MMHG | BODY MASS INDEX: 28.66 KG/M2 | RESPIRATION RATE: 18 BRPM | DIASTOLIC BLOOD PRESSURE: 82 MMHG | OXYGEN SATURATION: 98 % | HEIGHT: 65 IN | HEART RATE: 109 BPM | TEMPERATURE: 97.9 F

## 2021-05-11 DIAGNOSIS — Z11.59 ENCOUNTER FOR HEPATITIS C SCREENING TEST FOR LOW RISK PATIENT: ICD-10-CM

## 2021-05-11 DIAGNOSIS — Z23 ENCOUNTER FOR IMMUNIZATION: Primary | ICD-10-CM

## 2021-05-11 DIAGNOSIS — F32.4 MAJOR DEPRESSIVE DISORDER WITH SINGLE EPISODE, IN PARTIAL REMISSION (HCC): ICD-10-CM

## 2021-05-11 DIAGNOSIS — E03.9 ACQUIRED HYPOTHYROIDISM: ICD-10-CM

## 2021-05-11 DIAGNOSIS — Z00.00 MEDICARE ANNUAL WELLNESS VISIT, SUBSEQUENT: Primary | ICD-10-CM

## 2021-05-11 PROBLEM — Z12.11 COLON CANCER SCREENING: Status: ACTIVE | Noted: 2019-04-11

## 2021-05-11 PROBLEM — F39 MOOD DISORDER WITH PSYCHOSIS (HCC): Status: ACTIVE | Noted: 2018-07-12

## 2021-05-11 PROCEDURE — 1036F TOBACCO NON-USER: CPT | Performed by: NURSE PRACTITIONER

## 2021-05-11 PROCEDURE — G0439 PPPS, SUBSEQ VISIT: HCPCS | Performed by: NURSE PRACTITIONER

## 2021-05-11 PROCEDURE — 0011A SARS-COV-2 / COVID-19 MRNA VACCINE (MODERNA) 100 MCG: CPT

## 2021-05-11 PROCEDURE — 3725F SCREEN DEPRESSION PERFORMED: CPT | Performed by: NURSE PRACTITIONER

## 2021-05-11 PROCEDURE — 3008F BODY MASS INDEX DOCD: CPT | Performed by: NURSE PRACTITIONER

## 2021-05-11 PROCEDURE — 91301 SARS-COV-2 / COVID-19 MRNA VACCINE (MODERNA) 100 MCG: CPT

## 2021-05-11 NOTE — PATIENT INSTRUCTIONS

## 2021-05-11 NOTE — PROGRESS NOTES
Assessment and Plan:     Problem List Items Addressed This Visit        Endocrine    Hypothyroidism     -to continue on synthroid 75mcg  Latest TSH is normal from 2021  F/u every 6 months  Other    Medicare annual wellness visit, subsequent - Primary     Today we discussed relevant labs indicated to check cardiovascular status and endocrine and explained importance of blood work  I also reviewed with patient education and counseling about preventive services, including these: Certain screenings, flu and pneumococcal shots, and referrals for other care as needed  Pt to have colon screening  Done as his father  in correction with colon cancer  Patient to get COVID vaccine  Discussed with patient 5 wishes and blue folder instructions to go in her freezer  Pt made aware to have this Medicare physical done once a year  Major depressive disorder with single episode, in partial remission ([facility] [facility] )     -pt currently under care of psych  Was put on atarax and lexapro  Pt states he was referred to see psychologist for therapy but he did not go as he feels he does not need to go as he believes in [employer]  He dillon with his symptoms by praying and this takes his symptoms away  Does not take meds due to side effects  Denies suicidal/homicidal ideations  Encounter for hepatitis C screening test for low risk patient    Relevant Orders    Hepatitis C antibody        BMI Counseling: Body mass index is 28 62 kg/m²  The BMI is above normal  Nutrition recommendations include encouraging healthy choices of fruits and vegetables, decreasing fast food intake, consuming healthier snacks, limiting drinks that contain sugar, increasing intake of lean protein and reducing intake of cholesterol  Exercise recommendations include exercising 3-5 times per week  Depression Screening and Follow-up Plan: Patient's depression screening was positive with a PHQ-2 score of 1  Their PHQ-9 score was 4   Continue regular follow-up with their mental health provider who is managing their mental health condition(s)  Preventive health issues were discussed with patient, and age appropriate screening tests were ordered as noted in patient's After Visit Summary  Personalized health advice and appropriate referrals for health education or preventive services given if needed, as noted in patient's After Visit Summary       History of Present Illness:     Patient presents for Medicare Annual Wellness visit    Patient Care Team:  Landry feliz PCP - General (Nurse Practitioner)     Problem List:     Patient Active Problem List   Diagnosis    History of Hodgkin's lymphoma    Kidney lesion    Systolic murmur    Hypothyroidism    Colon cancer screening    Screening for cholesterol level    Hyperglycemia    Other fatigue    Screening for colon cancer    Medicare annual wellness visit, subsequent    Screening for prostate cancer    Chest pain    Generalized anxiety disorder    Major depressive disorder with single episode, [facility]    Pain of left heel    Toenail fungus    Needs flu shot    Family history of colon cancer    Mood disorder with psychosis ([facility] )    Encounter for hepatitis C screening test for low risk patient      Past Medical and Surgical History:     Past Medical History:   Diagnosis Date    Cancer ([facility] )     Depression     Disease of thyroid gland     Mood disorder with psychosis ([facility] ) 7/12/2018    Psychiatric disorder      Past Surgical History:   Procedure Laterality Date    HERNIA REPAIR        Family History:     Family History   Problem Relation Age of Onset    Colon cancer Father       Social History:        Social History     Socioeconomic History    Marital status: /Civil Union     Spouse name: None    Number of children: None    Years of education: None    Highest education level: None   Occupational History    None   Social Needs    Financial resource strain: None    Food insecurity     Worry: None     Inability: None    Transportation needs     Medical: None     Non-medical: None   Tobacco Use    Smoking status: Never Smoker    Smokeless tobacco: Never Used   Substance and Sexual Activity    Alcohol use: No    Drug use: No    Sexual activity: Not Currently   Lifestyle    Physical activity     Days per week: None     Minutes per session: None    Stress: None   Relationships    Social connections     Talks on phone: None     Gets together: None     Attends Lutheran service: None     Active member of club or organization: None     Attends meetings of clubs or organizations: None     Relationship status: None    Intimate partner violence     Fear of current or ex partner: None     Emotionally abused: None     Physically abused: None     Forced sexual activity: None   Other Topics Concern    None   Social History Narrative    None      Medications and Allergies:     Current Outpatient Medications   Medication Sig Dispense Refill    Artificial Tears 1 4 % ophthalmic solution       aspirin (ECOTRIN LOW STRENGTH) 81 mg EC tablet Take 1 tablet (81 mg total) by mouth daily 90 tablet 3    dexamethasone sodium phosphate 0 1 % ophthalmic solution       escitalopram (LEXAPRO) 10 mg tablet 1/2 tab qam x 5 days then 1 tab qam  30 tablet 1    hydrOXYzine HCL (ATARAX) 25 mg tablet 1 to 2 tabs daily PRN for anxiety 45 tablet 1    levothyroxine 75 mcg tablet TAKE ONE TABLET BY MOUTH ONCE DAILY EARLY IN THE MORNING 30 tablet 4    prednisoLONE acetate (PRED FORTE) 1 % ophthalmic suspension prednisolone acetate 1 % eye drops,suspension      predniSONE 10 mg tablet        No current facility-administered medications for this visit        No Known Allergies   Immunizations:     Immunization History   Administered Date(s) Administered    INFLUENZA 01/06/2016, 01/06/2016, 10/03/2016, 10/03/2016, 09/27/2018, 09/27/2018, 10/29/2019, 10/29/2019    Influenza, recombinant, quadrivalent,injectable, preservative free 09/29/2020    SARS-CoV-2 / COVID-19 mRNA IM (Missy Barlow) 05/11/2021      Health Maintenance:         Topic Date Due    Colorectal Cancer Screening  05/03/2021    HIV Screening  Completed         Topic Date Due    COVID-19 Vaccine (1) Never done      Medicare Health Risk Assessment:     /82 (BP Location: Left arm, Patient Position: Sitting, Cuff Size: Standard)   Pulse (!) 109   Temp 97 9 °F (36 6 °C) (Temporal)   Resp 18   Ht 5' 5" (1 651 m)   Wt 78 kg (172 lb)   SpO2 98%   BMI 28 62 kg/m²          Health Risk Assessment:   Patient rates overall health as fair  Patient feels that their physical health rating is same  Patient is satisfied with their life  Eyesight was rated as slightly worse  Hearing was rated as same  Patient feels that their emotional and mental health rating is same  Patients states they are never, rarely angry  Patient states they are often unusually tired/fatigued  Pain experienced in the last 7 days has been none  Patient states that he has experienced no weight loss or gain in last 6 months  Depression Screening:   PHQ-2 Score: 1  PHQ-9 Score: 4      Fall Risk Screening: In the past year, patient has experienced: no history of falling in past year      Home Safety:  Patient does not have trouble with stairs inside or outside of their home  Patient has working smoke alarms and has working carbon monoxide detector  Nutrition:   Current diet is Regular  Medications:   Patient is not currently taking any over-the-counter supplements  Patient is able to manage medications  Activities of Daily Living (ADLs)/Instrumental Activities of Daily Living (IADLs):   Walk and transfer into and out of bed and chair?: Yes  Dress and groom yourself?: Yes    Bathe or shower yourself?: Yes    Feed yourself?  Yes  Do your laundry/housekeeping?: Yes  Manage your money, pay your bills and track your expenses?: Yes  Make your own meals?: Yes    Do your own shopping?: Yes    Previous Hospitalizations:   Any hospitalizations or ED visits within the last 12 months?: No      Advance Care Planning:   Living will: No      PREVENTIVE SCREENINGS      Cardiovascular Screening:    General: Risks and Benefits Discussed    Due for: Lipid Panel      Diabetes Screening:     General: Screening Current      Colorectal Cancer Screening:     General: Risks and Benefits Discussed    Due for: Colonoscopy - High Risk      Prostate Cancer Screening:    General: Screening Current      Osteoporosis Screening:    General: Screening Not Indicated      Abdominal Aortic Aneurysm (AAA) Screening:        General: Screening Not Indicated      Lung Cancer Screening:     General: Screening Not Indicated      Hepatitis C Screening:    General: Risks and Benefits Discussed    Hep C Screening Accepted: Yes      Other Counseling Topics:   Car/seat belt/driving safety, skin self-exam, sunscreen and regular weightbearing exercise and calcium and vitamin D intake         LETHA Lugo

## 2021-05-11 NOTE — ASSESSMENT & PLAN NOTE
Today we discussed relevant labs indicated to check cardiovascular status and endocrine and explained importance of blood work  I also reviewed with patient education and counseling about preventive services, including these: Certain screenings, flu and pneumococcal shots, and referrals for other care as needed  Pt to have colon screening  Done as his father  in care home with colon cancer  Patient to get COVID vaccine  Discussed with patient 5 wishes and blue folder instructions to go in her freezer  Pt made aware to have this Medicare physical done once a year

## 2021-05-11 NOTE — ASSESSMENT & PLAN NOTE
-pt currently under care of psych  Was put on atarax and lexapro  Pt states he was referred to see psychologist for therapy but he did not go as he feels he does not need to go as he believes in [place]  He dillon with his symptoms by praying and this takes his symptoms away  Does not take meds due to side effects  Denies suicidal/homicidal ideations

## 2021-06-01 ENCOUNTER — TELEPHONE (OUTPATIENT)
Dept: GASTROENTEROLOGY | Facility: MEDICAL CENTER | Age: 51
End: 2021-06-01

## 2021-06-01 RX ORDER — SODIUM CHLORIDE 9 MG/ML
125 INJECTION, SOLUTION INTRAVENOUS CONTINUOUS
Status: CANCELLED | OUTPATIENT
Start: 2021-06-01

## 2021-06-07 DIAGNOSIS — E03.8 OTHER SPECIFIED HYPOTHYROIDISM: ICD-10-CM

## 2021-06-08 ENCOUNTER — IMMUNIZATIONS (OUTPATIENT)
Dept: FAMILY MEDICINE CLINIC | Facility: HOSPITAL | Age: 51
End: 2021-06-08

## 2021-06-08 DIAGNOSIS — Z23 ENCOUNTER FOR IMMUNIZATION: Primary | ICD-10-CM

## 2021-06-08 PROCEDURE — 91301 SARS-COV-2 / COVID-19 MRNA VACCINE (MODERNA) 100 MCG: CPT

## 2021-06-08 PROCEDURE — 0012A SARS-COV-2 / COVID-19 MRNA VACCINE (MODERNA) 100 MCG: CPT

## 2021-06-08 RX ORDER — LEVOTHYROXINE SODIUM 0.07 MG/1
TABLET ORAL
Qty: 30 TABLET | Refills: 3 | Status: SHIPPED | OUTPATIENT
Start: 2021-06-08 | End: 2021-09-13

## 2021-06-10 ENCOUNTER — TELEPHONE (OUTPATIENT)
Dept: GASTROENTEROLOGY | Facility: CLINIC | Age: 51
End: 2021-06-10

## 2021-06-10 NOTE — TELEPHONE ENCOUNTER
Patients GI provider:  Dr Pulliam Bhupendra    Number to return call: ([phone]    Reason for call: Pt called stating He wants to r/s his Colonoscopy  Pt would like to be called at the number above      Scheduled procedure/appointment date if applicable: n/a

## 2021-06-15 DIAGNOSIS — R07.9 CHEST PAIN, UNSPECIFIED TYPE: ICD-10-CM

## 2021-06-15 RX ORDER — ASPIRIN 81 MG/1
TABLET, COATED ORAL
Qty: 90 TABLET | Refills: 10 | Status: SHIPPED | OUTPATIENT
Start: 2021-06-15 | End: 2022-02-25 | Stop reason: HOSPADM

## 2021-06-16 ENCOUNTER — PREP FOR PROCEDURE (OUTPATIENT)
Dept: GASTROENTEROLOGY | Facility: CLINIC | Age: 51
End: 2021-06-16

## 2021-06-16 DIAGNOSIS — Z80.0 FAMILY HISTORY OF COLON CANCER IN FATHER: Primary | ICD-10-CM

## 2021-06-16 NOTE — TELEPHONE ENCOUNTER
Colon rescheduled to 7/26/21 with Dr Bessie Bah at [facility]  Patient still has prep instructions  OBS

## 2021-07-23 ENCOUNTER — TELEPHONE (OUTPATIENT)
Dept: GASTROENTEROLOGY | Facility: MEDICAL CENTER | Age: 51
End: 2021-07-23

## 2021-07-26 ENCOUNTER — ANESTHESIA (OUTPATIENT)
Dept: GASTROENTEROLOGY | Facility: MEDICAL CENTER | Age: 51
End: 2021-07-26

## 2021-07-26 ENCOUNTER — HOSPITAL ENCOUNTER (OUTPATIENT)
Dept: GASTROENTEROLOGY | Facility: MEDICAL CENTER | Age: 51
Setting detail: OUTPATIENT SURGERY
Discharge: HOME | End: 2021-07-26
Attending: INTERNAL MEDICINE | Admitting: INTERNAL MEDICINE
Payer: COMMERCIAL

## 2021-07-26 ENCOUNTER — ANESTHESIA EVENT (OUTPATIENT)
Dept: GASTROENTEROLOGY | Facility: MEDICAL CENTER | Age: 51
End: 2021-07-26

## 2021-07-26 VITALS
OXYGEN SATURATION: 98 % | TEMPERATURE: 98.6 F | RESPIRATION RATE: 17 BRPM | SYSTOLIC BLOOD PRESSURE: 129 MMHG | HEART RATE: 82 BPM | DIASTOLIC BLOOD PRESSURE: 83 MMHG

## 2021-07-26 DIAGNOSIS — Z80.0 FAMILY HISTORY OF COLON CANCER IN FATHER: ICD-10-CM

## 2021-07-26 PROCEDURE — 88305 TISSUE EXAM BY PATHOLOGIST: CPT | Performed by: PATHOLOGY

## 2021-07-26 PROCEDURE — 45385 COLONOSCOPY W/LESION REMOVAL: CPT | Performed by: INTERNAL MEDICINE

## 2021-07-26 RX ORDER — PROPOFOL 10 MG/ML
INJECTION, EMULSION INTRAVENOUS AS NEEDED
Status: DISCONTINUED | OUTPATIENT
Start: 2021-07-26 | End: 2021-07-26

## 2021-07-26 RX ORDER — LIDOCAINE HYDROCHLORIDE 20 MG/ML
INJECTION, SOLUTION EPIDURAL; INFILTRATION; INTRACAUDAL; PERINEURAL AS NEEDED
Status: DISCONTINUED | OUTPATIENT
Start: 2021-07-26 | End: 2021-07-26

## 2021-07-26 RX ORDER — SODIUM CHLORIDE 9 MG/ML
125 INJECTION, SOLUTION INTRAVENOUS CONTINUOUS
Status: DISCONTINUED | OUTPATIENT
Start: 2021-07-26 | End: 2021-07-30 | Stop reason: HOSPADM

## 2021-07-26 RX ADMIN — PROPOFOL 20 MG: 10 INJECTION, EMULSION INTRAVENOUS at 10:35

## 2021-07-26 RX ADMIN — PROPOFOL 100 MG: 10 INJECTION, EMULSION INTRAVENOUS at 10:33

## 2021-07-26 RX ADMIN — PROPOFOL 20 MG: 10 INJECTION, EMULSION INTRAVENOUS at 10:36

## 2021-07-26 RX ADMIN — Medication 40 MG: at 10:39

## 2021-07-26 RX ADMIN — PROPOFOL 20 MG: 10 INJECTION, EMULSION INTRAVENOUS at 10:38

## 2021-07-26 RX ADMIN — PROPOFOL 20 MG: 10 INJECTION, EMULSION INTRAVENOUS at 10:53

## 2021-07-26 RX ADMIN — PROPOFOL 30 MG: 10 INJECTION, EMULSION INTRAVENOUS at 10:40

## 2021-07-26 RX ADMIN — PROPOFOL 20 MG: 10 INJECTION, EMULSION INTRAVENOUS at 10:55

## 2021-07-26 RX ADMIN — SODIUM CHLORIDE 125 ML/HR: 0.9 INJECTION, SOLUTION INTRAVENOUS at 09:32

## 2021-07-26 RX ADMIN — PROPOFOL 30 MG: 10 INJECTION, EMULSION INTRAVENOUS at 10:51

## 2021-07-26 RX ADMIN — PROPOFOL 20 MG: 10 INJECTION, EMULSION INTRAVENOUS at 10:45

## 2021-07-26 RX ADMIN — PROPOFOL 30 MG: 10 INJECTION, EMULSION INTRAVENOUS at 10:48

## 2021-07-26 RX ADMIN — LIDOCAINE HYDROCHLORIDE 100 MG: 20 INJECTION, SOLUTION EPIDURAL; INFILTRATION; INTRACAUDAL; PERINEURAL at 10:33

## 2021-07-26 RX ADMIN — PROPOFOL 20 MG: 10 INJECTION, EMULSION INTRAVENOUS at 10:43

## 2021-07-26 NOTE — DISCHARGE INSTRUCTIONS
Colonoscopy   WHAT YOU NEED TO KNOW:   A colonoscopy is a procedure to examine the inside of your colon (intestine) with a scope  Polyps or tissue growths may have been removed during your colonoscopy  It is normal to feel bloated and to have some abdominal discomfort  You should be passing gas  If you have hemorrhoids or you had polyps removed, you may have a small amount of bleeding  DISCHARGE INSTRUCTIONS:   Seek care immediately if:    You have sudden, severe abdominal pain   You have problems swallowing   You have a large amount of black, sticky bowel movements or blood in your bowel movements   You have sudden trouble breathing   You feel weak, lightheaded, or faint or your heart beats faster than normal for you  Contact your healthcare provider if:    You have a fever and chills   You have nausea or are vomiting   Your abdomen is bloated or feels full and hard   You have abdominal pain   You have black, sticky bowel movements or blood in your bowel movements   You have not had a bowel movement for 3 days after your procedure   You have rash or hives   You have questions or concerns about your procedure  Activity:    Do not lift, strain, or run for 24 hours after your procedure   Rest after your procedure  You have been given medicine to relax you  Do not drive or make important decisions until the day after your procedure  Return to your normal activity as directed   Relieve gas and discomfort from bloating by lying on your right side with a heating pad on your abdomen  You may need to take short walks to help the gas move out  Eat small meals until bloating is relieved  Follow up with your healthcare provider as directed: Write down your questions so you remember to ask them during your visits  If you take a blood thinner, please review the specific instructions from your endoscopist about when you should resume it   These can be found in the Recommendation and Your Medication list sections of this After Visit Summary  Pólipos colorrectales   LO QUE NECESITA SABER:   Los pólipos colorrectales son pequeñas protuberancias de tejido que se encuentran en el forro del colon y recto  Bessie Hair de los pólipos son hiperplásticos y típicamente son benignos (no cancerosos)  Ciertos tipos de pólipos llamados adenomatosos, podrían convertirse en cáncer  INSTRUCCIONES SOBRE [facility]:   Alberto cruz juan de seguimiento con donohue médico o gastroenterólogo emery le indiquen: Es probable que usted tenga que regresar para hacerse pruebas adicionales emery otra colonoscopia  Anote slava preguntas para que se acuerde de hacerlas husam slava visitas  Reduzca donohue riesgo de pólipos colorrectales:  · Consuma alimentos saludables y variados: Los alimentos saludables incluyen fruta, vegetales, panes integrales, productos lácteos bajo en grasa, frijoles, rad sin grasa, y pescado  Pregunte si necesita seguir cruz dieta especial     · Mantenga un peso saludable: Pregunte al médico si necesita perder peso y cuánto  Pida ayuda con un programa para bajar de [place]  · Ejercicio: Marianna Porteous lentamente y alberto más a medida que se sienta más bernie  Consulte con donohue médico antes de empezar un régimen de ejercicios  · Limite el consumo de alcohol Donohue riesgo de tener pólipos aumenta cuanto más se terry  · No fume: Si usted fuma, nunca es demasiado tarde para dejar de hacerlo  Pida información para dejar de fumar  Para [place] y más información:  · [address] (NDDI)  [address], [place] [address]  Phone: [phone]  Web Address: Guzman thompson [facility] gov    Comuníquese con donohue médico o gastroenterólogo si:  · Tiene fiebre  · Usted tiene escalofríos, tos o se siente débil y adolorido  · Usted tiene dolor abdominal que no desaparece o aumenta después de rainer donohue medicamento      · Donohue abdomen se encuentra inflamado  · Usted pierde peso sin proponérselo  · Usted tiene preguntas o inquietudes acerca de donohue condición o cuidado  Busque atención médica de inmediato o llame al 911 si:  · Usted tiene falta de aire repentina  · Tiene el ritmo cardíaco acelerado, la respiración acelerada o está demasiado mareado o débil para pararse  · Usted tiene dolor abdominal intenso  · Usted ve santos en slava deposiciones  © Copyright [facility] [address] Information is for End User's use only and may not be sold, redistributed or otherwise used for commercial purposes  All illustrations and images included in CareNotes® are the copyrighted property of A D A M , Inc  or [address] [place] [address] es sólo para uso en educación  Donohue intención no es darle un consejo médico sobre enfermedades o tratamientos  Colsulte con donohue Susu Cabot farmacéutico antes de seguir cualquier régimen médico para saber si es seguro y efectivo para usted

## 2021-07-26 NOTE — ANESTHESIA PREPROCEDURE EVALUATION
Procedure:  COLONOSCOPY    Relevant Problems   ENDO   (+) Hypothyroidism      NEURO/PSYCH   (+) Generalized anxiety disorder   (+) History of Hodgkin's lymphoma   (+) Major depressive disorder with single episode, in partial remission [facility])        Physical Exam    Airway    Mallampati score: III  TM Distance: >3 FB  Neck ROM: full     Dental       Cardiovascular  Rhythm: regular, Rate: normal,     Pulmonary  Breath sounds clear to auscultation,     Other Findings        Anesthesia Plan  ASA Score- 3     Anesthesia Type- IV sedation with anesthesia with ASA Monitors  Additional Monitors:   Airway Plan:           Plan Factors-Exercise tolerance (METS): >4 METS  Chart reviewed  Existing labs reviewed  Induction- intravenous  Postoperative Plan-     Informed Consent- Anesthetic plan and risks discussed with patient

## 2021-07-26 NOTE — H&P
History and Physical -  [facility]  Ghislaine Collado 45 y o  male MRN: [id number]                  HPI: Ghislaine Collado is a 45y o  year old male with family history of colon cancer in his father who presents for colonoscopy for colon cancer screening  Previous colonoscopy 10 years ago where multiple polyps were removed  REVIEW OF SYSTEMS: Per the HPI, and otherwise unremarkable  Historical Information   Past Medical History:   Diagnosis Date    Cancer ([place] [address] )     Hodgkins lymphoma    Chest pain radiating to arm     Colon polyp     Depression     Disease of thyroid gland     Mood disorder with psychosis ([place] [address] ) 7/12/2018    Psychiatric disorder      Past Surgical History:   Procedure Laterality Date    COLONOSCOPY      HERNIA REPAIR       Social History   Social History     Substance and Sexual Activity   Alcohol Use No     Social History     Substance and Sexual Activity   Drug Use No     Social History     Tobacco Use   Smoking Status Never Smoker   Smokeless Tobacco Never Used     Family History   Problem Relation Age of Onset    Colon cancer Father        Meds/Allergies     (Not in a hospital admission)      No Known Allergies    Objective     Blood pressure 142/77, pulse 102, temperature 98 6 °F (37 °C), temperature source Temporal, resp  rate 16, SpO2 98 %  PHYSICAL EXAM    Gen: NAD  CV: RRR  CHEST: Clear  ABD: soft, NT/ND  EXT: no edema      ASSESSMENT/PLAN:  Ghislaine Collado is a 45y o  year old male with family history of colon cancer in his father who presents for colonoscopy for colon cancer screening  Previous colonoscopy 10 years ago where multiple polyps were removed  The patient is stable and optimized for the procedure, we reviewed risk and benefits  Risk include but not limited to infection, bleeding, perforation and missing a lesion

## 2021-08-02 ENCOUNTER — TELEPHONE (OUTPATIENT)
Dept: GASTROENTEROLOGY | Facility: MEDICAL CENTER | Age: 51
End: 2021-08-02

## 2021-08-02 NOTE — NURSING NOTE
Received a call from patient stating he had a colonoscopy done at [facility] on 7/26/21 and is now noticing a small amount of bright red rectal bleeding  States the last two times he moved his bowels he saw a small amount of blood dripping into the toilet  This information was relayed to Dr Jyoti Ingram via Josiah Loblinn Text  Dr Jyoti Ingram states he will call the patient

## 2021-08-04 ENCOUNTER — DOCUMENTATION (OUTPATIENT)
Dept: GASTROENTEROLOGY | Facility: MEDICAL CENTER | Age: 51
End: 2021-08-04

## 2021-08-04 NOTE — RESULT ENCOUNTER NOTE
Dear staff,     I called the patient today but was not able to talk patient  Left a voicemail patient to call back GI office at [phone]  Patient had 1 polyp removed during colonoscopy which was precancerous which means that all the polyp is benign currently it could have given rise to cancer years later had we not removed at  So this is good news  Repeat colonoscopy in 5 years as patient has family history of colon cancer  This was also discussed with patient after the procedure  I also noticed that he had called back with rectal bleeding soon after the procedure  Please kindly check with patient if he is still noticing blood in stools  Please kindly communicate this with patient  Thank you

## 2021-08-04 NOTE — PROGRESS NOTES
Called and discussed rectal bleeding the patient postprocedure  He has had only a scant amount of blood 1 day after the procedure but nothing since then  He is doing well

## 2021-08-05 ENCOUNTER — TELEPHONE (OUTPATIENT)
Dept: GASTROENTEROLOGY | Facility: CLINIC | Age: 51
End: 2021-08-05

## 2021-08-05 NOTE — TELEPHONE ENCOUNTER
----- Message from Giovani Fu MA sent at 8/4/2021  9:10 AM EDT -----    ----- Message -----  From: Joanne Jiang MD  Sent: 8/4/2021   8:40 AM EDT  To: Sherri Pinto MD, #    Dear staff,     I called the patient today but was not able to talk patient  Left a voicemail patient to call back GI office at [phone]  Patient had 1 polyp removed during colonoscopy which was precancerous which means that all the polyp is benign currently it could have given rise to cancer years later had we not removed at  So this is good news  Repeat colonoscopy in 5 years as patient has family history of colon cancer  This was also discussed with patient after the procedure  I also noticed that he had called back with rectal bleeding soon after the procedure  Please kindly check with patient if he is still noticing blood in stools  Please kindly communicate this with patient  Thank you

## 2021-08-24 ENCOUNTER — TELEPHONE (OUTPATIENT)
Dept: GASTROENTEROLOGY | Facility: CLINIC | Age: 51
End: 2021-08-24

## 2021-08-24 NOTE — TELEPHONE ENCOUNTER
----- Message from Ramona Wiseman MA sent at 8/4/2021  9:10 AM EDT -----    ----- Message -----  From: Inge Landers MD  Sent: 8/4/2021   8:40 AM EDT  To: Noemi Alberto MD, #    Dear staff,     I called the patient today but was not able to talk patient  Left a voicemail patient to call back GI office at [phone]  Patient had 1 polyp removed during colonoscopy which was precancerous which means that all the polyp is benign currently it could have given rise to cancer years later had we not removed at  So this is good news  Repeat colonoscopy in 5 years as patient has family history of colon cancer  This was also discussed with patient after the procedure  I also noticed that he had called back with rectal bleeding soon after the procedure  Please kindly check with patient if he is still noticing blood in stools  Please kindly communicate this with patient  Thank you

## 2021-09-09 NOTE — TELEPHONE ENCOUNTER
Patient notified of results, patient states he no longer has any bleeding in stool   He is feeling okay at this time

## 2021-09-13 DIAGNOSIS — E03.8 OTHER SPECIFIED HYPOTHYROIDISM: ICD-10-CM

## 2021-09-13 RX ORDER — LEVOTHYROXINE SODIUM 0.07 MG/1
TABLET ORAL
Qty: 30 TABLET | Refills: 2 | Status: SHIPPED | OUTPATIENT
Start: 2021-09-13 | End: 2021-11-29

## 2021-11-29 DIAGNOSIS — E03.8 OTHER SPECIFIED HYPOTHYROIDISM: ICD-10-CM

## 2021-11-29 RX ORDER — LEVOTHYROXINE SODIUM 0.07 MG/1
TABLET ORAL
Qty: 30 TABLET | Refills: 1 | Status: SHIPPED | OUTPATIENT
Start: 2021-11-29 | End: 2022-01-17

## 2021-12-09 ENCOUNTER — APPOINTMENT (OUTPATIENT)
Dept: LAB | Facility: HOSPITAL | Age: 51
End: 2021-12-09
Payer: COMMERCIAL

## 2021-12-09 ENCOUNTER — OFFICE VISIT (OUTPATIENT)
Dept: CARDIOLOGY CLINIC | Facility: CLINIC | Age: 51
End: 2021-12-09
Payer: COMMERCIAL

## 2021-12-09 VITALS
BODY MASS INDEX: 27.26 KG/M2 | WEIGHT: 163.6 LBS | HEART RATE: 104 BPM | HEIGHT: 65 IN | DIASTOLIC BLOOD PRESSURE: 88 MMHG | SYSTOLIC BLOOD PRESSURE: 134 MMHG

## 2021-12-09 DIAGNOSIS — R07.9 CHEST PAIN, UNSPECIFIED TYPE: Primary | ICD-10-CM

## 2021-12-09 DIAGNOSIS — R07.9 CHEST PAIN, UNSPECIFIED TYPE: ICD-10-CM

## 2021-12-09 LAB
CHOLEST SERPL-MCNC: 166 MG/DL
HDLC SERPL-MCNC: 60 MG/DL
LDLC SERPL CALC-MCNC: 89 MG/DL (ref 0–100)
TRIGL SERPL-MCNC: 84 MG/DL

## 2021-12-09 PROCEDURE — 93000 ELECTROCARDIOGRAM COMPLETE: CPT

## 2021-12-09 PROCEDURE — 3008F BODY MASS INDEX DOCD: CPT

## 2021-12-09 PROCEDURE — 80061 LIPID PANEL: CPT

## 2021-12-09 PROCEDURE — 1036F TOBACCO NON-USER: CPT

## 2021-12-09 PROCEDURE — 99213 OFFICE O/P EST LOW 20 MIN: CPT

## 2021-12-09 PROCEDURE — 36415 COLL VENOUS BLD VENIPUNCTURE: CPT

## 2022-01-17 DIAGNOSIS — E03.8 OTHER SPECIFIED HYPOTHYROIDISM: ICD-10-CM

## 2022-01-17 RX ORDER — LEVOTHYROXINE SODIUM 0.07 MG/1
TABLET ORAL
Qty: 30 TABLET | Refills: 0 | Status: ON HOLD | OUTPATIENT
Start: 2022-01-17 | End: 2022-02-15

## 2022-02-11 ENCOUNTER — TELEPHONE (OUTPATIENT)
Dept: HEMATOLOGY ONCOLOGY | Facility: CLINIC | Age: 52
End: 2022-02-11

## 2022-02-11 NOTE — TELEPHONE ENCOUNTER
I spoke with the patient in regards to his lab work that needs to be completed prior to his appointment on 2/14/22  Patient states understanding

## 2022-02-12 ENCOUNTER — HOSPITAL ENCOUNTER (INPATIENT)
Facility: HOSPITAL | Age: 52
LOS: 11 days | Discharge: HOME | DRG: 885 | End: 2022-02-25
Attending: EMERGENCY MEDICINE | Admitting: STUDENT IN AN ORGANIZED HEALTH CARE EDUCATION/TRAINING PROGRAM
Payer: MEDICARE

## 2022-02-12 DIAGNOSIS — F41.1 GENERALIZED ANXIETY DISORDER: ICD-10-CM

## 2022-02-12 DIAGNOSIS — F32.A DEPRESSION: Primary | ICD-10-CM

## 2022-02-12 DIAGNOSIS — E03.8 OTHER SPECIFIED HYPOTHYROIDISM: ICD-10-CM

## 2022-02-12 DIAGNOSIS — F39 MOOD DISORDER WITH PSYCHOSIS (HCC): ICD-10-CM

## 2022-02-12 LAB
ALBUMIN SERPL BCP-MCNC: 4.8 G/DL (ref 3–5.2)
ALP SERPL-CCNC: 106 U/L (ref 43–122)
ALT SERPL W P-5'-P-CCNC: 19 U/L
AMPHETAMINES SERPL QL SCN: NEGATIVE
ANION GAP SERPL CALCULATED.3IONS-SCNC: 9 MMOL/L (ref 5–14)
AST SERPL W P-5'-P-CCNC: 26 U/L (ref 17–59)
BACTERIA UR QL AUTO: ABNORMAL /HPF
BARBITURATES UR QL: NEGATIVE
BASOPHILS # BLD AUTO: 0.1 THOUSANDS/ΜL (ref 0–0.1)
BASOPHILS NFR BLD AUTO: 1 % (ref 0–1)
BENZODIAZ UR QL: NEGATIVE
BILIRUB SERPL-MCNC: 0.92 MG/DL
BILIRUB UR QL STRIP: NEGATIVE
BUN SERPL-MCNC: 19 MG/DL (ref 5–25)
CALCIUM SERPL-MCNC: 8.8 MG/DL (ref 8.4–10.2)
CHLORIDE SERPL-SCNC: 102 MMOL/L (ref 97–108)
CLARITY UR: CLEAR
CO2 SERPL-SCNC: 26 MMOL/L (ref 22–30)
COCAINE UR QL: NEGATIVE
COLOR UR: ABNORMAL
CREAT SERPL-MCNC: 0.96 MG/DL (ref 0.7–1.5)
EOSINOPHIL # BLD AUTO: 0 THOUSAND/ΜL (ref 0–0.4)
EOSINOPHIL NFR BLD AUTO: 1 % (ref 0–6)
ERYTHROCYTE [DISTWIDTH] IN BLOOD BY AUTOMATED COUNT: 13.6 %
ETHANOL EXG-MCNC: 0 MG/DL
FLUAV RNA RESP QL NAA+PROBE: NEGATIVE
FLUBV RNA RESP QL NAA+PROBE: NEGATIVE
GFR SERPL CREATININE-BSD FRML MDRD: 90 ML/MIN/1.73SQ M
GLUCOSE SERPL-MCNC: 99 MG/DL (ref 70–99)
GLUCOSE UR STRIP-MCNC: NEGATIVE MG/DL
HCT VFR BLD AUTO: 45.4 % (ref 41–53)
HGB BLD-MCNC: 15.1 G/DL (ref 13.5–17.5)
HGB UR QL STRIP.AUTO: 25
KETONES UR STRIP-MCNC: ABNORMAL MG/DL
LEUKOCYTE ESTERASE UR QL STRIP: NEGATIVE
LYMPHOCYTES # BLD AUTO: 1.2 THOUSANDS/ΜL (ref 0.5–4)
LYMPHOCYTES NFR BLD AUTO: 12 % (ref 25–45)
MCH RBC QN AUTO: 30.8 PG (ref 26–34)
MCHC RBC AUTO-ENTMCNC: 33.3 G/DL (ref 31–36)
MCV RBC AUTO: 93 FL (ref 80–100)
METHADONE UR QL: NEGATIVE
MONOCYTES # BLD AUTO: 1 THOUSAND/ΜL (ref 0.2–0.9)
MONOCYTES NFR BLD AUTO: 10 % (ref 1–10)
MUCOUS THREADS UR QL AUTO: ABNORMAL
NEUTROPHILS # BLD AUTO: 7.5 THOUSANDS/ΜL (ref 1.8–7.8)
NEUTS SEG NFR BLD AUTO: 77 % (ref 45–65)
NITRITE UR QL STRIP: NEGATIVE
NON-SQ EPI CELLS URNS QL MICRO: ABNORMAL /HPF
OPIATES UR QL SCN: NEGATIVE
OXYCODONE+OXYMORPHONE UR QL SCN: NEGATIVE
PCP UR QL: NEGATIVE
PH UR STRIP.AUTO: 6 [PH]
PLATELET # BLD AUTO: 293 THOUSANDS/UL (ref 150–450)
PMV BLD AUTO: 8.9 FL (ref 8.9–12.7)
POTASSIUM SERPL-SCNC: 3.8 MMOL/L (ref 3.6–5)
PROT SERPL-MCNC: 8.9 G/DL (ref 5.9–8.4)
PROT UR STRIP-MCNC: ABNORMAL MG/DL
RBC # BLD AUTO: 4.9 MILLION/UL (ref 4.5–5.9)
RBC #/AREA URNS AUTO: ABNORMAL /HPF
RSV RNA RESP QL NAA+PROBE: NEGATIVE
SARS-COV-2 RNA RESP QL NAA+PROBE: NEGATIVE
SODIUM SERPL-SCNC: 137 MMOL/L (ref 137–147)
SP GR UR STRIP.AUTO: 1.02 (ref 1–1.04)
THC UR QL: NEGATIVE
TSH SERPL DL<=0.05 MIU/L-ACNC: 2.95 UIU/ML (ref 0.47–4.68)
UROBILINOGEN UA: NEGATIVE MG/DL
WBC # BLD AUTO: 9.7 THOUSAND/UL (ref 4.5–11)
WBC #/AREA URNS AUTO: ABNORMAL /HPF

## 2022-02-12 PROCEDURE — 81003 URINALYSIS AUTO W/O SCOPE: CPT | Performed by: EMERGENCY MEDICINE

## 2022-02-12 PROCEDURE — 93005 ELECTROCARDIOGRAM TRACING: CPT

## 2022-02-12 PROCEDURE — 84443 ASSAY THYROID STIM HORMONE: CPT | Performed by: EMERGENCY MEDICINE

## 2022-02-12 PROCEDURE — 99285 EMERGENCY DEPT VISIT HI MDM: CPT | Performed by: EMERGENCY MEDICINE

## 2022-02-12 PROCEDURE — 80053 COMPREHEN METABOLIC PANEL: CPT | Performed by: EMERGENCY MEDICINE

## 2022-02-12 PROCEDURE — 81001 URINALYSIS AUTO W/SCOPE: CPT | Performed by: EMERGENCY MEDICINE

## 2022-02-12 PROCEDURE — 0241U HB NFCT DS VIR RESP RNA 4 TRGT: CPT | Performed by: EMERGENCY MEDICINE

## 2022-02-12 PROCEDURE — 85025 COMPLETE CBC W/AUTO DIFF WBC: CPT | Performed by: EMERGENCY MEDICINE

## 2022-02-12 PROCEDURE — 82075 ASSAY OF BREATH ETHANOL: CPT | Performed by: EMERGENCY MEDICINE

## 2022-02-12 PROCEDURE — 80307 DRUG TEST PRSMV CHEM ANLYZR: CPT | Performed by: EMERGENCY MEDICINE

## 2022-02-12 PROCEDURE — 36415 COLL VENOUS BLD VENIPUNCTURE: CPT | Performed by: EMERGENCY MEDICINE

## 2022-02-12 PROCEDURE — 99285 EMERGENCY DEPT VISIT HI MDM: CPT

## 2022-02-12 NOTE — ED NOTES
Patient is a 45 yr old male brought to the ED after family called EMS  Patient has stopped taking care of himself after his wife had a stroke - she continues to be at [facility] in ICU  Clover has been somewhat confused and limited historian, but he gave permission for his mother to be contacted  Mother has been going to his apartment, encouraging him to eat, to take his medication  He has gotten very depressed, not eating or sleeping,  overwhelmed with his wife's hospitalization  He is tangental and repetative in both [facility] [place] and [address] [place] [address] when interviewed  Affect is flat, sad, eye contact is poor  He has not been taking his medications, or caring for himself  His mother lives nearby and will go to check on him ( he has 2 dogs and 2 birds and she is caring for them)  She felt that he was not safe at home and called the ambulance  Patient did sign a [id number] for inpatient admission  Patient has been treated at [facility] but more recently was at [facility] and saw Dr Chaz Moreland    He has been hospitalized about 10 xs in the past, the last seems to be at [facility] M in 7/2018    Ricky ABREU

## 2022-02-12 NOTE — ED NOTES
[place]  Recipient #[id number]  [facility] managed by [facility],  Phone number: [phone]  Primary payor is [facility] Medicare

## 2022-02-12 NOTE — ED PROVIDER NOTES
History  Chief Complaint   Patient presents with    Psychiatric Evaluation     59-year-old male with a history of depression, hypothyroidism, Hodgkin's lymphoma presenting for evaluation of increasing depression and helplessness at home  Patient states he has been unable to sleep for several days and is unable to eat  He states he had increasing feelings of depression and hopelessness  He becomes intermittently tearful during my evaluation  He denies hallucinations and suicidal and homicidal ideation  He is unable to tell me which medications he is taking  Throughout the interview, he is somewhat disorganized and is talking about nonrelevant things such as his wife, dog, and housing  History is obtained via  who was also having difficulty understanding the patient and his thoughts  When asked why he came to the hospital today, he started explaining that he came here because someone in his family is [address]  History provided by:  Patient  History limited by:  Psychiatric disorder   used: Yes (Shanelle Bocanegra)        Prior to Admission Medications   Prescriptions Last Dose Informant Patient Reported? Taking?    Artificial Tears 1 4 % ophthalmic solution  Self Yes No   Aspirin Low Dose 81 MG EC tablet  Self No No   Sig: TAKE ONE TABLET BY MOUTH ONCE DAILY   dexamethasone sodium phosphate 0 1 % ophthalmic solution  Self Yes No   escitalopram (LEXAPRO) 10 mg tablet  Self No No   Si/2 tab qam x 5 days then 1 tab qam    Patient not taking: Reported on 2021    hydrOXYzine HCL (ATARAX) 25 mg tablet  Self No No   Si to 2 tabs daily PRN for anxiety   Patient not taking: Reported on 2021    levothyroxine 75 mcg tablet   No No   Sig: TAKE ONE TABLET BY MOUTH ONCE DAILY IN THE MORNING   predniSONE 10 mg tablet  Self Yes No   Patient not taking: Reported on 2021    prednisoLONE acetate (PRED FORTE) 1 % ophthalmic suspension  Self Yes No   Sig: prednisolone acetate 1 % eye drops,suspension      Facility-Administered Medications: None       Past Medical History:   Diagnosis Date    Cancer ([facility] )     Hodgkins lymphoma    Chest pain radiating to arm     Colon polyp     Depression     Disease of thyroid gland     Mood disorder with psychosis ([facility] ) 7/12/2018    Psychiatric disorder        Past Surgical History:   Procedure Laterality Date    COLONOSCOPY      HERNIA REPAIR         Family History   Problem Relation Age of Onset    Colon cancer Father      I have reviewed and agree with the history as documented  E-Cigarette/Vaping    E-Cigarette Use Never User      E-Cigarette/Vaping Substances     Social History     Tobacco Use    Smoking status: Never Smoker    Smokeless tobacco: Never Used   Vaping Use    Vaping Use: Never used   Substance Use Topics    Alcohol use: No    Drug use: No       Review of Systems   Constitutional: Negative for chills and fever  HENT: Negative for congestion, rhinorrhea and sore throat  Eyes: Negative for pain, discharge and visual disturbance  Respiratory: Negative for cough and shortness of breath  Cardiovascular: Negative for chest pain, palpitations and leg swelling  Gastrointestinal: Negative for abdominal pain, diarrhea, nausea and vomiting  Genitourinary: Negative for dysuria, frequency and hematuria  Musculoskeletal: Negative for arthralgias and myalgias  Skin: Negative for color change and rash  Neurological: Negative for dizziness, weakness, light-headedness, numbness and headaches  Hematological: Does not bruise/bleed easily  Psychiatric/Behavioral: Positive for dysphoric mood and sleep disturbance  Negative for hallucinations, self-injury and suicidal ideas  The patient is nervous/anxious  Physical Exam  Physical Exam  Vitals and nursing note reviewed  Constitutional:       General: He is not in acute distress  Appearance: Normal appearance     HENT:      Head: Normocephalic and atraumatic  Nose: Nose normal       Mouth/Throat:      Mouth: Mucous membranes are moist    Eyes:      General: No scleral icterus  Extraocular Movements: Extraocular movements intact  Conjunctiva/sclera: Conjunctivae normal       Pupils: Pupils are equal, round, and reactive to light  Cardiovascular:      Rate and Rhythm: Normal rate and regular rhythm  Pulmonary:      Effort: Pulmonary effort is normal  No respiratory distress  Breath sounds: No wheezing, rhonchi or rales  Abdominal:      General: There is no distension  Palpations: Abdomen is soft  Tenderness: There is no abdominal tenderness  There is no guarding or rebound  Musculoskeletal:         General: No swelling, tenderness or deformity  Cervical back: Neck supple  Skin:     General: Skin is warm and dry  Findings: No rash  Neurological:      General: No focal deficit present  Mental Status: He is alert  Motor: No weakness  Psychiatric:      Comments: Disorganized thoughts, tangential         Vital Signs  ED Triage Vitals [02/12/22 1054]   Temperature Pulse Respirations Blood Pressure SpO2   98 8 °F (37 1 °C) 100 20 146/88 97 %      Temp Source Heart Rate Source Patient Position - Orthostatic VS BP Location FiO2 (%)   Tympanic Monitor Sitting Left arm --      Pain Score       --           Vitals:    02/12/22 1054   BP: 146/88   Pulse: 100   Patient Position - Orthostatic VS: Sitting         Visual Acuity      ED Medications  Medications - No data to display    Diagnostic Studies  Results Reviewed     Procedure Component Value Units Date/Time    TSH [[id number]]  (Normal) Collected: 02/12/22 1120    Lab Status: Final result Specimen: Blood from Arm, Left Updated: 02/12/22 1222     TSH 3RD GENERATON 2 950 uIU/mL     Narrative:      Patients undergoing fluorescein dye angiography may retain small amounts of fluorescein in the body for 48-72 hours post procedure   Samples containing fluorescein can produce falsely depressed TSH values  If the patient had this procedure,a specimen should be resubmitted post fluorescein clearance  COVID/FLU/RSV - 2 hour TAT [[id number]]  (Normal) Collected: 02/12/22 1120    Lab Status: Final result Specimen: Nares from Nose Updated: 02/12/22 1208     SARS-CoV-2 Negative     INFLUENZA A PCR Negative     INFLUENZA B PCR Negative     RSV PCR Negative    Narrative:      FOR PEDIATRIC PATIENTS - copy/paste COVID Guidelines URL to browser: [email or web address]  [employer]    SARS-CoV-2 assay is a Nucleic Acid Amplification assay intended for the  qualitative detection of nucleic acid from SARS-CoV-2 in nasopharyngeal  swabs  Results are for the presumptive identification of SARS-CoV-2 RNA  Positive results are indicative of infection with SARS-CoV-2, the virus  causing COVID-19, but do not rule out bacterial infection or co-infection  with other viruses  Laboratories within the [place] and its  territories are required to report all positive results to the appropriate  public health authorities  Negative results do not preclude SARS-CoV-2  infection and should not be used as the sole basis for treatment or other  patient management decisions  Negative results must be combined with  clinical observations, patient history, and epidemiological information  This test has not been FDA cleared or approved  This test has been authorized by FDA under an Emergency Use Authorization  (EUA)  This test is only authorized for the duration of time the  declaration that circumstances exist justifying the authorization of the  emergency use of an in vitro diagnostic tests for detection of SARS-CoV-2  virus and/or diagnosis of COVID-19 infection under section 564(b)(1) of  the Act, 21 U  S C  267TEC-6(L)(7), unless the authorization is terminated  or revoked sooner   The test has been validated but independent review by FDA  and CLIA is pending  Test performed using [employer] GeneXpert: This RT-PCR assay targets N2,  a region unique to SARS-CoV-2  A conserved region in the E-gene was chosen  for pan-Sarbecovirus detection which includes SARS-CoV-2      Comprehensive metabolic panel [[id number]]  (Abnormal) Collected: 02/12/22 1120    Lab Status: Final result Specimen: Blood from Arm, Left Updated: 02/12/22 1159     Sodium 137 mmol/L      Potassium 3 8 mmol/L      Chloride 102 mmol/L      CO2 26 mmol/L      ANION GAP 9 mmol/L      BUN 19 mg/dL      Creatinine 0 96 mg/dL      Glucose 99 mg/dL      Calcium 8 8 mg/dL      AST 26 U/L      ALT 19 U/L      Alkaline Phosphatase 106 U/L      Total Protein 8 9 g/dL      Albumin 4 8 g/dL      Total Bilirubin 0 92 mg/dL      eGFR 90 ml/min/1 73sq m     Narrative:      National Kidney Disease Foundation guidelines for Chronic Kidney Disease (CKD):     Stage 1 with normal or high GFR (GFR > 90 mL/min/1 73 square meters)    Stage 2 Mild CKD (GFR = 60-89 mL/min/1 73 square meters)    Stage 3A Moderate CKD (GFR = 45-59 mL/min/1 73 square meters)    Stage 3B Moderate CKD (GFR = 30-44 mL/min/1 73 square meters)    Stage 4 Severe CKD (GFR = 15-29 mL/min/1 73 square meters)    Stage 5 End Stage CKD (GFR <15 mL/min/1 73 square meters)  Note: GFR calculation is accurate only with a steady state creatinine    CBC and differential [[id number]]  (Abnormal) Collected: 02/12/22 1120    Lab Status: Final result Specimen: Blood from Arm, Left Updated: 02/12/22 1134     WBC 9 70 Thousand/uL      RBC 4 90 Million/uL      Hemoglobin 15 1 g/dL      Hematocrit 45 4 %      MCV 93 fL      MCH 30 8 pg      MCHC 33 3 g/dL      RDW 13 6 %      MPV 8 9 fL      Platelets 084 Thousands/uL      Neutrophils Relative 77 %      Lymphocytes Relative 12 %      Monocytes Relative 10 %      Eosinophils Relative 1 %      Basophils Relative 1 %      Neutrophils Absolute 7 50 Thousands/µL      Lymphocytes Absolute 1 20 Thousands/µL      Monocytes Absolute 1 00 Thousand/µL      Eosinophils Absolute 0 00 Thousand/µL      Basophils Absolute 0 10 Thousands/µL     POCT alcohol breath test [[id number]]  (Normal) Resulted: 02/12/22 1121    Lab Status: Final result Updated: 02/12/22 1121     EXTBreath Alcohol 0 000    Rapid drug screen, urine [[id number]]     Lab Status: No result Specimen: Urine     UA w Reflex to Microscopic w Reflex to Culture [[id number]]     Lab Status: No result Specimen: Urine                  No orders to display              Procedures  ECG 12 Lead Documentation Only    Date/Time: 2/12/2022 11:23 AM  Performed by: Zack Dee MD  Authorized by: Zack Dee MD     Indications / Diagnosis:  Obtained via triage, no chest pain  ECG reviewed by me, the ED Provider: yes    Patient location:  ED  Previous ECG:     Previous ECG:  Unavailable  Interpretation:     Interpretation: abnormal    Rate:     ECG rate:  97    ECG rate assessment: normal    Rhythm:     Rhythm: sinus rhythm    Ectopy:     Ectopy: none    QRS:     QRS axis:  Normal    QRS intervals:  Normal  Conduction:     Conduction: normal    ST segments:     ST segments:  Normal  T waves:     T waves: normal    Other findings:     Other findings: LVH               ED Course  ED Course as of 02/12/22 1551   Sat Feb 12, 2022   1121 EXTBreath Alcohol: 0 000   1135 CBC and differential(!)  Left shift, otherwise grossly normal   1207 Comprehensive metabolic panel(!)  Grossly normal   1214 SARS-COV-2: Negative   1214 INFLU A PCR: Negative   1214 INFLU B PCR: Negative   1214 RSV PCR: Negative   1215 Patient is medically clear to undergo psychiatric evaluation; still waiting TSH and urine results  200 TSH 3RD GENERATON: 2 950                               SBIRT 20yo+      Most Recent Value   SBIRT (22 yo +)    In order to provide better care to our patients, we are screening all of our patients for alcohol and drug use   Would it be okay to ask you these screening questions? Yes Filed at: 02/12/2022 1100   Initial Alcohol Screen: US AUDIT-C     1  How often do you have a drink containing alcohol? 0 Filed at: 02/12/2022 1100   2  How many drinks containing alcohol do you have on a typical day you are drinking? 0 Filed at: 02/12/2022 1100   3a  Male UNDER 65: How often do you have five or more drinks on one occasion? 0 Filed at: 02/12/2022 1100   3b  FEMALE Any Age, or MALE 65+: How often do you have 4 or more drinks on one occassion? 0 Filed at: 02/12/2022 1100   Audit-C Score 0 Filed at: 02/12/2022 1100   BREANN: How many times in the past year have you    Used an illegal drug or used a prescription medication for non-medical reasons? Never Filed at: 02/12/2022 1100                    MDM  Number of Diagnoses or Management Options  Depression  Diagnosis management comments: 62-year-old male presenting for psychiatric evaluation  Patient reports worsening symptoms of depression and hopelessness but is having some disorganized thinking and is difficult to follow even with   Currently denies hallucinations and suicidal ideation  Screening laboratory studies were obtained which are grossly normal   Patient is medically appropriate to undergo psychiatric evaluation  Patient pending evaluation by crisis at this time  Signed out to my colleague for further management  Disposition  Final diagnoses:   Depression     Time reflects when diagnosis was documented in both MDM as applicable and the Disposition within this note     Time User Action Codes Description Comment    2/12/2022 12:14 PM Kaci Omer Add [F32  A] Depression       ED Disposition     ED Disposition Condition Date/Time Comment    Transfer to [facility] Feb 12, 2022 12:14 PM Gustavo Lou should be transferred out to behavioral health and has been medically cleared          Follow-up Information    None         Patient's Medications   Discharge Prescriptions No medications on file       No discharge procedures on file      PDMP Review     None          ED Provider  Electronically Signed by           Carrie Deleon MD  02/12/22 9344

## 2022-02-12 NOTE — ED NOTES
No beds in the [address] [facility]:    [facility]) no beds   [facility]  [place] - no beds  [facility])  No beds  [facility], no beds  [address] ([facility]) no beds  [facility]) no beds  [facility] KENA HINTON) no beds  [facility] - no beds Adarhs Haley)  [place] - no beds until [employer]

## 2022-02-13 LAB
ATRIAL RATE: 97 BPM
P AXIS: 64 DEGREES
PR INTERVAL: 140 MS
QRS AXIS: 48 DEGREES
QRSD INTERVAL: 78 MS
QT INTERVAL: 368 MS
QTC INTERVAL: 467 MS
T WAVE AXIS: 69 DEGREES
VENTRICULAR RATE: 97 BPM

## 2022-02-13 PROCEDURE — 93010 ELECTROCARDIOGRAM REPORT: CPT | Performed by: INTERNAL MEDICINE

## 2022-02-13 RX ORDER — LEVOTHYROXINE SODIUM 0.07 MG/1
75 TABLET ORAL
Status: DISCONTINUED | OUTPATIENT
Start: 2022-02-14 | End: 2022-02-25 | Stop reason: HOSPADM

## 2022-02-13 NOTE — ED NOTES
Continued bed search:    [facility]; Full per Lauren Boards in Intake  [place]: Full per Creta May  [facility]: Full per Guille [place]: Full per Aleksey Cortez  [place]: Full per JRHOCW  [facility]: Full per Deshaun Andres  [facility]: Full per Flora  [place]: Message left with Odin Savage from answering service  [place]: Full per [employer]  [place]: Will review for 2/14 per Dayday [place]: Full per [employer]

## 2022-02-13 NOTE — ED NOTES
[place] Suicide Risk Assessment deferred, as unable to assess while patient sleeping  Behavioral Health Assessment deferred as patient is sleeping and would benefit from additional rest   Vital signs deferred until patient awake, no signs or symptoms of respiratory distress at this time  Once patient is awake and able to participate, will complete assessments       Eren Bhatia RN  02/13/22 8703

## 2022-02-13 NOTE — ED CARE HANDOFF
Emergency Department Sign Out Note        Sign out and transfer of care from Dr Florentino Gaona  See Separate Emergency Department note  The patient, Tania Reyes, was evaluated by the previous provider for psych  Workup Completed:  See previous ED notes    ED Course / Workup Pending (followup):                                  ED Course as of 02/13/22 1610   Sat Feb 12, 2022   1121 EXTBreath Alcohol: 0 000   1135 CBC and differential(!)  Left shift, otherwise grossly normal   1207 Comprehensive metabolic panel(!)  Grossly normal   1214 SARS-COV-2: Negative   1214 INFLU A PCR: Negative   1214 INFLU B PCR: Negative   1214 RSV PCR: Negative   1215 Patient is medically clear to undergo psychiatric evaluation; still waiting TSH and urine results  1240 TSH 3RD GENERATON: 2 950   Sun Feb 13, 2022   5675 Sign out: 201, awaiting placement, continue safety plan     Procedures  MDM        Disposition  Final diagnoses:   Depression     Time reflects when diagnosis was documented in both MDM as applicable and the Disposition within this note     Time User Action Codes Description Comment    2/12/2022 12:14 PM Jomar Knox Add [F32  A] Depression       ED Disposition     ED Disposition Condition Date/Time Comment    Transfer to [facility] Feb 12, 2022 12:14 PM Tania Reyes should be transferred out to behavioral health and has been medically cleared  MD Documentation      Most Recent Value   Sending MD Dr Miranda Boswell    None       Patient's Medications   Discharge Prescriptions    No medications on file     No discharge procedures on file         ED Provider  Electronically Signed by     Charlie Almendarez MD  02/13/22 8579

## 2022-02-13 NOTE — ED NOTES
[place]-spoke with [facility], no beds   [employer] with Wally, no beds   [place]-spoke with Felicia Velazquez, no beds   [place]-spoke with Jose, no beds   [facility]-spoke with Keyur Reaves, no beds   [facility], no beds   [facility] with Carlos Lizama, no beds   [facility] with Herve Hitchcock, no beds   [facility]-spoke with Alfonzo Varela, no beds   [facility]-no answer, left message requesting call back   [place]-spoke with Jessica Hall, no beds

## 2022-02-13 NOTE — ED NOTES
Report and transfer of care to [facility] at this time  [facility] Suicide Risk Assessment deferred, as unable to assess while patient sleeping  Behavioral Health Assessment deferred as patient is sleeping and would benefit from additional rest   Vital signs deferred until patient awake, no signs or symptoms of respiratory distress at this time  Once patient is awake and able to participate, will complete assessments       Judd Carlson RN  02/13/22 1163

## 2022-02-13 NOTE — ED NOTES
LMB Pt mother came to ED requesting update on pt  Reviewed chart where pt provided approval to speak with mother  Update provided to mother via [employer]   Pt mother requesting phone call when placement is decided once bed search is complete        Edna Vieira RN  02/13/22 1309

## 2022-02-14 DIAGNOSIS — E03.8 OTHER SPECIFIED HYPOTHYROIDISM: ICD-10-CM

## 2022-02-14 PROCEDURE — 99213 OFFICE O/P EST LOW 20 MIN: CPT | Performed by: STUDENT IN AN ORGANIZED HEALTH CARE EDUCATION/TRAINING PROGRAM

## 2022-02-14 RX ORDER — OLANZAPINE 2.5 MG/1
2.5 TABLET ORAL
Status: DISCONTINUED | OUTPATIENT
Start: 2022-02-14 | End: 2022-02-25 | Stop reason: HOSPADM

## 2022-02-14 RX ORDER — TRAZODONE HYDROCHLORIDE 50 MG/1
50 TABLET ORAL
Status: DISCONTINUED | OUTPATIENT
Start: 2022-02-14 | End: 2022-02-25 | Stop reason: HOSPADM

## 2022-02-14 RX ORDER — ACETAMINOPHEN 325 MG/1
650 TABLET ORAL EVERY 4 HOURS PRN
Status: DISCONTINUED | OUTPATIENT
Start: 2022-02-14 | End: 2022-02-25 | Stop reason: HOSPADM

## 2022-02-14 RX ORDER — OLANZAPINE 10 MG/1
5 INJECTION, POWDER, LYOPHILIZED, FOR SOLUTION INTRAMUSCULAR
Status: DISCONTINUED | OUTPATIENT
Start: 2022-02-14 | End: 2022-02-25 | Stop reason: HOSPADM

## 2022-02-14 RX ORDER — OLANZAPINE 5 MG/1
5 TABLET ORAL
Status: DISCONTINUED | OUTPATIENT
Start: 2022-02-14 | End: 2022-02-25 | Stop reason: HOSPADM

## 2022-02-14 RX ORDER — HYDROXYZINE 50 MG/1
50 TABLET, FILM COATED ORAL
Status: DISCONTINUED | OUTPATIENT
Start: 2022-02-14 | End: 2022-02-25 | Stop reason: HOSPADM

## 2022-02-14 RX ORDER — HYDROXYZINE HYDROCHLORIDE 25 MG/1
25 TABLET, FILM COATED ORAL
Status: DISCONTINUED | OUTPATIENT
Start: 2022-02-14 | End: 2022-02-25 | Stop reason: HOSPADM

## 2022-02-14 RX ORDER — ACETAMINOPHEN 325 MG/1
975 TABLET ORAL EVERY 6 HOURS PRN
Status: DISCONTINUED | OUTPATIENT
Start: 2022-02-14 | End: 2022-02-25 | Stop reason: HOSPADM

## 2022-02-14 RX ORDER — LORAZEPAM 2 MG/ML
1 INJECTION INTRAMUSCULAR
Status: DISCONTINUED | OUTPATIENT
Start: 2022-02-14 | End: 2022-02-25 | Stop reason: HOSPADM

## 2022-02-14 RX ADMIN — LEVOTHYROXINE SODIUM 75 MCG: 75 TABLET ORAL at 06:46

## 2022-02-14 NOTE — ED NOTES
Patient asleep no appearance of distress zoila and fall of chest wall appears easy and non-labored       Justyna Nails RN  02/14/22 2067

## 2022-02-14 NOTE — ED NOTES
Pt has been lethargic and appears sad throughout the day  Pt often observed resting or sleeping in bed, but will awake and make needs known  No distress noted throughout entire day  Pt ate approx 50% of lunch and 75% of dinner  Pt voiced continued concern about significant other being in the hospital  Pt states he is depressed but denies SI/HI/AH/VH  q7 min checks completed throughout the day; as well as safety checks during these observations  Pt currently sleeping on stretcher without distress  Continues with q7min checks        Edna Vieira RN  02/13/22 2858

## 2022-02-14 NOTE — ED NOTES
Insurance Authorization for admission:   [id number] faxed  Level of care: inpatient mental health   Review on **  To be determined  Authorization # **  To be determined       EVS (Eligibility Verification System) called - [phone]    Automated system indicates: 1 Yaya Egan was done with Maribeth Matson at [employer]

## 2022-02-14 NOTE — ED NOTES
Patient  Had body odor and hygiene provide opportunity for shower cleaned room changed his bedding and provided snacks and liquids         Sofi Eugene RN  02/14/22 5296

## 2022-02-14 NOTE — ED NOTES
Bed Search:  No network beds     [place]: [facility]- no beds  Deitra Cypher: [place]-no beds  [place]: Tiff Quiroz- no beds  [facility]: [facility]- no beds  [facility]: Frank Gastelum- no beds  [place]: Epifanio Caicedo- no beds  [place]: Carlos Singhk- no beds  [facility]: Yair Raines- no beds  [address]: [facility]- no beds  [facility]: no answer, left VM  [place]:  Paige Byrd- no beds  [facility]: Lisa Sprague- no beds  [facility]: no answer  [facility]: [facility]  [place]: Karyna Rivera- no beds    Bed Search exhausted to continue in AM

## 2022-02-14 NOTE — PROGRESS NOTES
Progress Note - Behavioral Health   Lanny Henderson 45 y o  male MRN: [id number]  Unit/Bed#: ED 03 Encounter: [id number]    Ramu Ambriz is a 46 y/o  male with past psychiatric history pertinent of major depressive disorder and generalized anxiety disorder who presented to the ED via EMS due to worsening depression, noncompliant with his medications, and inability to take care of self  Recent stressor includes feeling overwhelmed as his wife suffered a stroke and currently is admitted to the [facility] in the ICU  Since this recent event, patient's mother has been helping take care of him, but patient has been withdrawn and depressed  He is scant in speech and appears distracted at times  Denies AH/VH  Unable to assess for SI/HI as pt refused to answer  A Kazakh 201 was signed for inpatient admission  Differential: recurrent depressive disorder, severe vs adjustment disorder with depressed mood    Plan  1  Patient has signed 12 for voluntary admission, awaiting inpatient psychiatry placement  2  Recommend no changes to psychiatric medications at this time as pt remains under good behavioral control  Can consider restarting his home medication of Lexapro 10 mg for depression and anxiety  3  Please do not hesitate to reach out via [email or web address] with any questions or concerns    ------------------------------------------------------------    Subjective:     Pt was seen and examined for length of stay  Per chart review, he had recently seen Dr Carlin Heller and has had 10 hospitalizations in the past with recent inpatient psychiatric hospitalization in 2018 at [address] [facility]  [address]  Pt speaks limited [facility] [place] [facility]  A Kazakh-speaking staff member helped with translation as I evaluated patient  When asked patient's mood, patient was unable to reply and stared off in a distance  He reports poor sleep and appetite   Pt was asked several times about if he was experiencing suicidal or homicidal thoughts; however, pt refused to answer  He denies any AH/VH  Pt appears guarded and scant in speech  Psychiatric Review of Systems:  Behavior over the last 24 hours: unchanged  Sleep: insomnia  Appetite: decreased from baseline  Medication side effects: none verbalized  ROS: Complete review of systems is negative except as noted above  Vital signs in last 24 hours:  Temp:  [98 2 °F (36 8 °C)-99 1 °F (37 3 °C)] 99 1 °F (37 3 °C)  HR:  [] 100  Resp:  [15-18] 16  BP: (140-148)/() 140/101    Mental Status Exam:  Appearance:  alert, poor eye contact, appears stated age, appropriate grooming and hygiene and  male   Behavior:  limited cooperativity, guarded and laying in bed   Motor: no abnormal movements   Speech:  slow and scant   Mood:  Unable to obtain due to patient factors   Affect:  depressed   Thought Process:  unable to assess due to patient factors   Thought Content: unable to assess due to patient factors   Perceptual disturbances: Cannot be assessed due to patient factors   Risk Potential: No active or passive suicidal or homicidal ideation was verbalized during interview   Cognition: appears to be of average intelligence and cognition not formally tested   Insight:  Poor   Judgment: Poor     Current Medications: All current medications have been reviewed  Current Facility-Administered Medications   Medication Dose Route Frequency Provider Last Rate    levothyroxine  75 mcg Oral Early Morning Cristy Montalvo MD         Laboratory results:  I have personally reviewed all pertinent laboratory/tests results    Recent Results (from the past 48 hour(s))   Rapid drug screen, urine    Collection Time: 02/12/22  7:12 PM   Result Value Ref Range    Amph/Meth UR Negative Negative    Barbiturate Ur Negative Negative    Benzodiazepine Urine Negative Negative    Cocaine Urine Negative Negative    Methadone Urine Negative Negative    Opiate Urine Negative Negative    PCP Ur Negative Negative    THC Urine Negative Negative    Oxycodone Urine Negative Negative   UA w Reflex to Microscopic w Reflex to Culture    Collection Time: 02/12/22  7:12 PM    Specimen: Urine, Other   Result Value Ref Range    Color, UA Sherly (A) Straw, Yellow, Pale Yellow    Clarity, UA Clear Clear, Other    Specific Gravity, UA 1 025 1 003 - 1 040    pH, UA 6 0 4 5, 5 0, 5 5, 6 0, 6 5, 7 0, 7 5, 8 0    Leukocytes, UA Negative Negative    Nitrite, UA Negative Negative    Protein, UA 15 (Trace) (A) Negative mg/dl    Glucose, UA Negative Negative mg/dl    Ketones, UA >=150 (3+) (A) Negative mg/dl    Bilirubin, UA Negative Negative    Blood, UA 25 0 (A) Negative    UROBILINOGEN UA Negative 1 0, Negative mg/dL   Urine Microscopic    Collection Time: 02/12/22  7:12 PM   Result Value Ref Range    RBC, UA 1-2 None Seen, 0-1, 1-2, 2-4, 0-5 /hpf    WBC, UA 0-1 None Seen, 0-1, 1-2, 0-5, 2-4 /hpf    Epithelial Cells Occasional None Seen, Occasional /hpf    Bacteria, UA Occasional None Seen, Occasional /hpf    MUCUS THREADS Moderate (A) None Seen        Tod Avers, DO

## 2022-02-14 NOTE — ED NOTES
Patient resting quietly appears to be asleep, no signs and symptoms of distress       Fer Reyes RN  02/14/22 7050

## 2022-02-14 NOTE — ED NOTES
[facility] SPECIALTY [facility] is reviewing for admission

## 2022-02-14 NOTE — ED NOTES
Barry Suicide Risk Assessment deferred, as unable to assess while patient sleeping  Behavioral Health Assessment deferred as patient is sleeping and would benefit from additional rest   Vital signs deferred until patient awake, no signs or symptoms of respiratory distress at this time  Once patient is awake and able to participate, will complete assessments       De Severs, RN  02/14/22 5138

## 2022-02-14 NOTE — ED NOTES
Patient is accepted at [facility] 6[facility]  Patient is accepted by Satinder Amaral  Patient may go to the floor at unit will call ED for report          Nurse report is to be called to [phone] prior to patient transfer         Notified patient's mother of his admission

## 2022-02-15 PROBLEM — R07.89 ATYPICAL CHEST PAIN: Status: ACTIVE | Noted: 2022-02-15

## 2022-02-15 PROBLEM — Z00.8 MEDICAL CLEARANCE FOR PSYCHIATRIC ADMISSION: Status: ACTIVE | Noted: 2022-02-15

## 2022-02-15 PROBLEM — F33.9 RECURRENT MAJOR DEPRESSIVE DISORDER (HCC): Status: ACTIVE | Noted: 2022-02-15

## 2022-02-15 PROCEDURE — 99253 IP/OBS CNSLTJ NEW/EST LOW 45: CPT

## 2022-02-15 PROCEDURE — 99222 1ST HOSP IP/OBS MODERATE 55: CPT | Performed by: STUDENT IN AN ORGANIZED HEALTH CARE EDUCATION/TRAINING PROGRAM

## 2022-02-15 RX ORDER — ARIPIPRAZOLE 2 MG/1
2 TABLET ORAL DAILY
Status: DISCONTINUED | OUTPATIENT
Start: 2022-02-15 | End: 2022-02-21

## 2022-02-15 RX ORDER — ESCITALOPRAM OXALATE 10 MG/1
10 TABLET ORAL DAILY
Status: DISCONTINUED | OUTPATIENT
Start: 2022-02-15 | End: 2022-02-17

## 2022-02-15 RX ORDER — LEVOTHYROXINE SODIUM 0.07 MG/1
TABLET ORAL
Qty: 30 TABLET | Refills: 0 | Status: SHIPPED | OUTPATIENT
Start: 2022-02-15 | End: 2022-02-25 | Stop reason: SDUPTHER

## 2022-02-15 RX ADMIN — ARIPIPRAZOLE 2 MG: 2 TABLET ORAL at 12:22

## 2022-02-15 RX ADMIN — ESCITALOPRAM OXALATE 10 MG: 10 TABLET ORAL at 12:22

## 2022-02-15 RX ADMIN — LEVOTHYROXINE SODIUM 75 MCG: 75 TABLET ORAL at 05:32

## 2022-02-15 NOTE — ASSESSMENT & PLAN NOTE
· Patient with a history of Hodgkins Lymphoma in remission  Had radiation treatment to mediastinum  · Per chart review patient was seen by cardiology for atypical chest pain, recommended Aspirin 81mg daily after stress echo and ekg were negative     · Continue ASA 81 mg upon admission
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
## 2022-02-15 NOTE — PLAN OF CARE
Problem: Risk for Self Injury/Neglect  Goal: Treatment Goal: Remain safe during length of stay, learn and adopt new coping skills, and be free of self-injurious ideation, impulses and acts at the time of discharge  Outcome: Not Progressing  Goal: Verbalize thoughts and feelings  Description: Interventions:  - Assess and re-assess patient's lethality and potential for self-injury  - Engage patient in 1:1 interactions, daily, for a minimum of 15 minutes  - Encourage patient to express feelings, fears, frustrations, hopes  - Establish rapport/trust with patient   Outcome: Not Progressing  Goal: Refrain from harming self  Description: Interventions:  - Monitor patient closely, per order  - Develop a trusting relationship  - Supervise medication ingestion, monitor effects and side effects   Outcome: Not Progressing     Problem: Depression  Goal: Treatment Goal: Demonstrate behavioral control of depressive symptoms, verbalize feelings of improved mood/affect, and adopt new coping skills prior to discharge  Outcome: Not Progressing  Goal: Verbalize thoughts and feelings  Description: Interventions:  - Assess and re-assess patient's level of risk   - Engage patient in 1:1 interactions, daily, for a minimum of 15 minutes   - Encourage patient to express feelings, fears, frustrations, hopes   Outcome: Not Progressing  Goal: Refrain from harming self  Description: Interventions:  - Monitor patient closely, per order   - Supervise medication ingestion, monitor effects and side effects   Outcome: Not Progressing  Goal: Refrain from isolation  Description: Interventions:  - Develop a trusting relationship   - Encourage socialization   Outcome: Not Progressing     Problem: Anxiety  Goal: Anxiety is at manageable level  Description: Interventions:  - Assess and monitor patient's anxiety level     - Monitor for signs and symptoms (heart palpitations, chest pain, shortness of breath, headaches, nausea, feeling jumpy, restlessness, irritable, apprehensive)  - Collaborate with interdisciplinary team and initiate plan and interventions as ordered    - [place] patient to unit/surroundings  - Explain treatment plan  - Encourage participation in care  - Encourage verbalization of concerns/fears  - Identify coping mechanisms  - Assist in developing anxiety-reducing skills  - Administer/offer alternative therapies  - Limit or eliminate stimulants  Outcome: Not Progressing

## 2022-02-15 NOTE — TREATMENT PLAN
TREATMENT PLAN REVIEW - [address] [facility] 45 y o  1970 male MRN: [id number]    [address] Room / Bed: [place] [facility] Encounter: [id number]        Admit Date/Time:  2/12/2022 10:55 AM    Treatment Team: Attending Provider: Link Perez MD; Patient Care Assistant: Fortino Hill; Patient Care Assistant: Arabella Schwartz; Patient Care Assistant: Selam Bah;  Occupational Therapy Assistant: Dmitriy Rollins; Licensed Practical Nurse: Danuta Quinones LPN; Nurse Manager: Ambika Mccracken RN; Patient Care Assistant: Dorothea Mascorro    Diagnosis: Active Problems:    Hypothyroidism    Atypical chest pain    Medical clearance for psychiatric admission    Patient Strengths/Assets: ability for insight, cooperative, compliant with medication, Rastafari affiliation, strong dick, work skills      Patient Barriers/Limitations: chronic mental illness, difficulty adapting, poor self-care, Wife stroke 10 days ago, stressor    Short Term Goals: decrease in depressive symptoms, decrease in anxiety symptoms, sleep improvement, improvement in appetite, tolerate medications, mood stabilization    Long Term Goals: improvement in depression, resolution of depressive symptoms, acceptance of need for psychiatric medications, acceptance of need for psychiatric treatment, adequate self care, adequate sleep, adequate appetite, adequate oral intake    Progress Towards Goals: starting psychiatric medications as prescribed, improving    Recommended Treatment: medication management, patient medication education, group therapy, milieu therapy, continued Behavioral Health psychiatric evaluation/assessment process     Treatment Frequency: daily medication monitoring, group and milieu therapy daily, monitoring through interdisciplinary rounds, monitoring through weekly patient care conferences    Expected Discharge Date: 10 days - 2/25/2022    Discharge Plan: return to previous living arrangement    Treatment Plan Created/Updated By: Octavio Lazo MD

## 2022-02-15 NOTE — NURSING NOTE
Pt is visible on unit  Pt guarded during assessment questions denying depression and anxiety; denies SI/HI/AH/VH  Pt stated, "I just need to make some decisions " Pt is compliant with medications and meals, appetite fair  Will maintain q7 min checks

## 2022-02-15 NOTE — CONSULTS
[address] [facility] 1970, 45 y o  male MRN: [id number]  Unit/Bed#: Destiny Ferreira 131-23 Encounter: [id number]  Primary Care Provider: Sherrilyn Meigs, CRNP   Date and time admitted to hospital: 2/12/2022 10:55 AM    Inpatient consult for Medical Clearance for [address] patient  Consult performed by: Lisette Schmid PA-C  Consult ordered by: Una Zamudio MD          Medical clearance for psychiatric admission  Assessment & Plan  · Patient is medically cleared for admission to the [facility] for treatment of the underlying psychiatric illness  · Reviewed Recent Labs:  ·  COVID (-)  · UA (- nitrates, - leukocytes)  · CBC, CMP wnl   · EKG: NSR , possible left atrial enlargement, LVH     Atypical chest pain  Assessment & Plan  · Patient with a history of Hodgkins Lymphoma in remission  Had radiation treatment to mediastinum  · Per chart review patient was seen by cardiology for atypical chest pain, recommended Aspirin 81mg daily after stress echo and ekg were negative  · Continue ASA 81 mg upon admission     Hypothyroidism  Assessment & Plan  · Continue Levothyroxine sodium 75 mcg      ECT Clearance:   History of recent seizure or stroke:  no   History of pheochromocytoma:  no   History of active bleeding (Intracranial hemorrhage, aneurysm or AVM):  no   History of metallic implants in the head or neck:  no   History of increased intracranial pressure with mass effect:  no   Does the patient have a current arrhythmia?  no      Based on above criteria, Patient IS medically cleared for ECT should it be recommended  Counseling / Coordination of Care Time: 30 minutes  Greater than 50% of total time spent on patient counseling and coordination of care  Collaboration of Care:  Were Recommendations Directly Discussed with Primary Treatment Team? - No     History of Present Illness:    Alejandro Vasquez is a 45 y o  male who is originally admitted to the psychiatry service due to increased depression  We are consulted for medical clearance for admission to [address] [place] [address] Unit and treatment of underlying psychiatric illness  Patient has a past medical history of hypothyroidism and depression  He also had a history of Hodgkin's Lymphoma, in remission  Patient denies any physical complaints at this time such as dizziness, headaches, chest pain, shortness of breath, nausea/vomiting/diarrhea, urinary symptoms at this time  Will continue to follow with patient throughout hospitalization      Review of Systems:    Review of Systems   HENT: Negative  Respiratory: Negative for chest tightness  Cardiovascular: Negative  Negative for chest pain  Gastrointestinal: Negative  Negative for abdominal pain  Genitourinary: Negative  Musculoskeletal: Negative  Negative for back pain  Psychiatric/Behavioral: Positive for dysphoric mood         Past Medical and Surgical History:     Past Medical History:   Diagnosis Date    Cancer ([facility] )     Hodgkins lymphoma    Chest pain radiating to arm     Colon polyp     Depression     Disease of thyroid gland     Mood disorder with psychosis ([facility] ) 7/12/2018    Psychiatric disorder        Past Surgical History:   Procedure Laterality Date    COLONOSCOPY      HERNIA REPAIR         Meds/Allergies:    all medications and allergies reviewed    Allergies: No Known Allergies    Social History:     Marital Status: /Civil Union    Substance Use History:   Social History     Substance and Sexual Activity   Alcohol Use No     Social History     Tobacco Use   Smoking Status Never Smoker   Smokeless Tobacco Never Used     Social History     Substance and Sexual Activity   Drug Use No       Family History:    non-contributory    Physical Exam:     Vitals:   Blood Pressure: 132/82 (02/15/22 0710)  Pulse: 97 (02/15/22 0710)  Temperature: 97 7 °F (36 5 °C) (02/15/22 0710)  Temp Source: Temporal (02/15/22 0710)  Respirations: 16 (02/15/22 0710)  Height: 5' 8" (172 7 cm) (02/14/22 2048)  Weight - Scale: 68 9 kg (151 lb 14 4 oz) (02/14/22 2048)  SpO2: 99 % (02/15/22 0710)    Physical Exam  Constitutional:       Appearance: He is not ill-appearing  Cardiovascular:      Rate and Rhythm: Normal rate and regular rhythm  Heart sounds: No murmur heard  Pulmonary:      Effort: Pulmonary effort is normal       Breath sounds: Normal breath sounds  Abdominal:      Palpations: Abdomen is soft  Tenderness: There is no abdominal tenderness  Skin:     General: Skin is warm and dry  Neurological:      Mental Status: He is oriented to person, place, and time  Psychiatric:         Mood and Affect: Mood is depressed  Additional Data:     Lab Results: I have personally reviewed pertinent reports  Results from last 7 days   Lab Units 02/12/22  1120   WBC Thousand/uL 9 70   HEMOGLOBIN g/dL 15 1   HEMATOCRIT % 45 4   PLATELETS Thousands/uL 293   NEUTROS PCT % 77*   LYMPHS PCT % 12*   MONOS PCT % 10   EOS PCT % 1     Results from last 7 days   Lab Units 02/12/22  1120   SODIUM mmol/L 137   POTASSIUM mmol/L 3 8   CHLORIDE mmol/L 102   CO2 mmol/L 26   BUN mg/dL 19   CREATININE mg/dL 0 96   ANION GAP mmol/L 9   CALCIUM mg/dL 8 8   ALBUMIN g/dL 4 8   TOTAL BILIRUBIN mg/dL 0 92   ALK PHOS U/L 106   ALT U/L 19   AST U/L 26   GLUCOSE RANDOM mg/dL 99             Lab Results   Component Value Date/Time    HGBA1C 5 3 03/25/2020 09:05 AM    HGBA1C 5 5 07/17/2018 06:40 AM           EKG, Pathology, and Other Studies Reviewed on Admission:   · EKG:  Normal sinus rhythm  Possible Left atrial enlargement  Left ventricular hypertrophy  Abnormal ECG  When compared with ECG of 21-JUL-1993 03:18,  No significant change was found  Confirmed by Cameron Arellano ([id number]) on 2/13/2022 7:37:32 PM    ** Please Note: This note has been constructed using a voice recognition system   **

## 2022-02-15 NOTE — H&P
Psychiatric Evaluation - [facility] Umer 45 y o  male MRN: [id number]  Unit/Bed#: Destiny Ferreira 109-92 Encounter: [id number]    Assessment/Plan   Active Problems:    * No active hospital problems  *    Plan:   · Restart Lexapro 10 mg daily for anxiety and depression  · Consider increasing tomorrow  · Initiate Abilify 2 mg as an adjunct for depression treatment    Admission labs  Frequent safety checks and vitals per unit protocol  Collaborate with family for baseline assessment and disposition planning  Case discussed with treatment team   Treatment options and alternatives were reviewed with the patient       -----------------------------------    Chief Complaint:  Sara Mix got very depressed in the last days    History of Present Illness     Interview was conducted with Comoran-speaking  Mandy Greg is a 45 y o  male Comoran-speaking with past medical history of major depressive disorder, Hodgkin's lymphoma, hypothyroidism who presents for worsening depression, helplessness, hopelessness and poor self-care for the past 10 days after his wife had a stroke and was admitted to [address] ICU  201 signed  Patient reports that he has a history of major depressive disorder since a young age, has been hospitalized 10 times, usually for worsening depression  For the past year, he has been stable off of his Lexapro 10 mg daily and Atarax 25 mg p r n  He has been doing well, until 10 days ago when his wife had a stroke and was admitted to [address] ICU  Over the past 10 days he has had worsening depression, hopelessness, helplessness, poor sleep and no appetite  His mother, who lives nearby, came to the house to make sure he was safe and decided to call the ambulance  Patient reports poor sleep, low interest, no guilt, low energy, normal concentration, decreased appetite, psychomotor slowing, but denies suicidal ideation  He reports he is very isolative and tearful    Denies any anxiety, panic attacks, PTSD history  Denies history of keya  Currently denying SI, HI, AVH, any history of suicide attempts  He reports he wants to come into the hospital, get back on his medications, have them adjusted, handle his depression, and work with therapist moving forward to help him through this tough time in his life  He reports he had healthy and happy marriage, he never fought with his wife, and everything change 10 days ago  He is agreeable to restarting his home Lexapro 10 mg and initiating Abilify 2 mg as an adjunct  Medical Review Of Systems:  Complete review of systems is negative except as noted above  Psychiatric Review Of Systems:  Problems with sleep: yes, decreased  Appetite changes: yes, decreased  Weight changes: no  Low energy/anergy: yes  Low interest/pleasure/anhedonia: yes  Somatic symptoms: no  Anxiety/panic: no  Keya: no  Guilt/hopeless: yes  Self injurious behavior/risky behavior: no    Historical Information     Psychiatric History:   Prior psychiatric diagnoses:  Major depressive disorder with psychotic features  Inpatient hospitalizations:  Patient reports 10 times, last at [address] [place] [address] Lexa 07/2018 for depression with psychotic features, was put on Risperdal and Paxil  Suicide attempts:  Denies  Self-harm behaviors: patient denies  Violent behavior: patient denies  Outpatient treatment:  Reports a history with Dr Jesusita Homans at [facility], has been a long time since he saw him  Also in the past, a while ago, went to [facility]  Psychiatric medication trial:  Patient only remembers Risperdal, Paxil, Lexapro, Atarax, has been on others he reports    Substance Abuse History:  Social History     Tobacco Use    Smoking status: Never Smoker    Smokeless tobacco: Never Used   Vaping Use    Vaping Use: Never used   Substance Use Topics    Alcohol use: No    Drug use: No      Patient denies use of tobacco, alcohol, or illicit drugs     I have assessed this patient for substance use within the past 12 months      Family Psychiatric History:   Endorses a father who has schizophrenia, mother who has depression, and sister who has a mood disorder    Social History  Marital history: /Civil Union  Children: no  Living arrangement:  Lives in an apartment in [place] with his wife  Functioning Relationships: good support system  Education: high school diploma/GED  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[employer], has a part-time job  Other Pertinent History: None      Traumatic History:   Abuse: none reported  Other Traumatic Events: none reported    Past Medical History:   Past Medical History:   Diagnosis Date    Cancer ([facility] )     Hodgkins lymphoma    Chest pain radiating to arm     Colon polyp     Depression     Disease of thyroid gland     Mood disorder with psychosis ([place] [address] ) 7/12/2018    Psychiatric disorder         -----------------------------------  Objective    Temp:  [97 7 °F (36 5 °C)-99 1 °F (37 3 °C)] 97 7 °F (36 5 °C)  HR:  [] 97  Resp:  [16] 16  BP: (132-143)/() 132/82    Mental Status Exam:  Appearance:  alert, good eye contact, appears stated age, casually dressed, marginal grooming/hygiene and Head hanging low, poor eye contact, looking towards brown, shoulder slumped   Behavior:  calm, cooperative and Poor eye contact   Motor: no abnormal movements and slow gait   Speech:  spontaneous, slow, soft, coherent and Moldovan-speaking   Mood:  depressed   Affect:  constricted, blunted and depressed   Thought Process:  Organized, logical, goal-directed   Thought Content: no verbalized delusions or overt paranoia   Perceptual disturbances: no reported hallucinations and does not appear to be responding to internal stimuli at this time   Risk Potential: No active or passive suicidal or homicidal ideation was verbalized during interview   Cognition: cognition not formally tested and Patient oriented to hospital, self, year, month, last 3 presidents, could tell the days of the week backwards (good concentration)   Insight:  Limited   Judgment: Limited       Meds/Allergies   No Known Allergies  all current active meds have been reviewed    Behavioral Health Medications: all current active meds have been reviewed  Changes as in Plan section above  Laboratory results:  I have personally reviewed all pertinent laboratory/tests results  No results found for this or any previous visit (from the past 48 hour(s))  Imaging Studies:   No orders to display            -----------------------------------    Risks / Benefits of Treatment:  Risks, benefits, and possible side effects of medications were explained to patient  The patient was able to verbalize understanding and agree for treatment  Counseling / Coordination of Care:  Patient's presentation on admission and proposed treatment plan were discussed with the treatment team   Diagnosis, medication changes and treatment plan were reviewed with the patient  Recent stressors were discussed with the patient  Events leading to admission were reviewed with the patient  Importance of medication and treatment compliance was reviewed with the patient  Discussed with patient plan for alcohol detoxification protocol and gradual taper of medications to prevent withdrawal symptoms  Inpatient Psychiatric Certification:     Certification: Based upon physical, mental and social evaluations, I certify that inpatient psychiatric services are medically necessary for this patient for a duration of 7 midnights for the treatment of <principal problem not specified>  Available alternative community resources do not meet the patient's mental health care needs  I further attest that an established written individualized plan of care has been implemented and is outlined in the patient's medical records        Reji Thomas MD

## 2022-02-15 NOTE — ASSESSMENT & PLAN NOTE
· Patient is medically cleared for admission to the [facility] for treatment of the underlying psychiatric illness  · Reviewed Recent Labs:  ·  COVID (-)  · UA (- nitrates, - leukocytes)  · CBC, CMP wnl   · EKG: NSR , possible left atrial enlargement, LVH

## 2022-02-15 NOTE — PLAN OF CARE
Problem: Risk for Self Injury/Neglect  Goal: Treatment Goal: Remain safe during length of stay, learn and adopt new coping skills, and be free of self-injurious ideation, impulses and acts at the time of discharge  Outcome: Progressing  Goal: Verbalize thoughts and feelings  Description: Interventions:  - Assess and re-assess patient's lethality and potential for self-injury  - Engage patient in 1:1 interactions, daily, for a minimum of 15 minutes  - Encourage patient to express feelings, fears, frustrations, hopes  - Establish rapport/trust with patient   Outcome: Progressing  Goal: Refrain from harming self  Description: Interventions:  - Monitor patient closely, per order  - Develop a trusting relationship  - Supervise medication ingestion, monitor effects and side effects   Outcome: Progressing     Problem: Depression  Goal: Treatment Goal: Demonstrate behavioral control of depressive symptoms, verbalize feelings of improved mood/affect, and adopt new coping skills prior to discharge  Outcome: Progressing  Goal: Verbalize thoughts and feelings  Description: Interventions:  - Assess and re-assess patient's level of risk   - Engage patient in 1:1 interactions, daily, for a minimum of 15 minutes   - Encourage patient to express feelings, fears, frustrations, hopes   Outcome: Progressing  Goal: Refrain from harming self  Description: Interventions:  - Monitor patient closely, per order   - Supervise medication ingestion, monitor effects and side effects   Outcome: Progressing  Goal: Refrain from isolation  Description: Interventions:  - Develop a trusting relationship   - Encourage socialization   Outcome: Progressing     Problem: Anxiety  Goal: Anxiety is at manageable level  Description: Interventions:  - Assess and monitor patient's anxiety level  - Monitor for signs and symptoms (heart palpitations, chest pain, shortness of breath, headaches, nausea, feeling jumpy, restlessness, irritable, apprehensive)     - Collaborate with interdisciplinary team and initiate plan and interventions as ordered  - [place] patient to unit/surroundings  - Explain treatment plan  - Encourage participation in care  - Encourage verbalization of concerns/fears  - Identify coping mechanisms  - Assist in developing anxiety-reducing skills  - Administer/offer alternative therapies  - Limit or eliminate stimulants  Outcome: Progressing     Problem: Nutrition/Hydration-ADULT  Goal: Nutrient/Hydration intake appropriate for improving, restoring or maintaining nutritional needs  Description: Monitor and assess patient's nutrition/hydration status for malnutrition  Collaborate with interdisciplinary team and initiate plan and interventions as ordered  Monitor patient's weight and dietary intake as ordered or per policy  Utilize nutrition screening tool and intervene as necessary  Determine patient's food preferences and provide high-protein, high-caloric foods as appropriate       INTERVENTIONS:  - Monitor oral intake, urinary output, labs, and treatment plans  - Assess nutrition and hydration status and recommend course of action  - Evaluate amount of meals eaten  - Assist patient with eating if necessary   - Allow adequate time for meals  - Recommend/ encourage appropriate diets, oral nutritional supplements, and vitamin/mineral supplements  - Order, calculate, and assess calorie counts as needed  - Recommend, monitor, and adjust tube feedings and TPN/PPN based on assessed needs  - Assess need for intravenous fluids  - Provide specific nutrition/hydration education as appropriate  - Include patient/family/caregiver in decisions related to nutrition  Outcome: Progressing

## 2022-02-15 NOTE — CASE MANAGEMENT
Psychosocial Assessment 1:1   Cm met with pt privately; CM able to conduct intake in Kenyan  Pt reports able to speak and understand English although preference is for [place]  Pt appeared depressed, reported feeling sad about his wife's stroke  Pt often times had difficulty with finding words and finishing thoughts  Pt denied any AH; reports he is distracted by the thoughts of what happened to his wife  Pt reports his wife is still in ICU, he was able to visit her but she can not speak  Pt reports she has lost motor skills in right arm  Pt reports his wife will need snf placement but unsure where she will go  Pt described typical day but mentioned only activities he would do with his wife  Pt reports 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 [employer]  CM questioned pt's mother providing assistance and care to pt while pt's wife is in hospital  Pt unable to state what his mother does for him  Pt unable to finish thought  Pt repeatedly spoke of God and [place], reports "Gutierrez Laurent will help " Pt reports his stepdtr Marleen Lane, tel# [phone] is helping to manage his wife's care  Pt signed SHAWNA for Marleen Lane  Admission / Details: 201 admission due to increased depression, unable to care for self due to wife currently in LVH due to stroke  County: [facility]  Commitment Status: 201  Insurance: Salvador Holley  Rep, [address] [place] [address]  Rx coverage: denies difficulty filling scripts  Marital Status:  x 10 years  Children: pt does not have any children; reports his wife has children  Family: daily contact with mother Mercedes Burrows, tel# [phone]; signed SHAWNA for mother  Residence: apt; reports he has applied for Section 8 and is on waitlist  Can return home: yes  Lives with: wife  Level of Ed: 12th  Work History: past work at [employer], [employer] and [employer]   Last worked approx 4 yrs ago  Income/Source: SSI $1305, Food stamps $172  Baptist: Javed Ramirez  Transportation: [employer] bus or family  Legal Issues: denies  [facility] [address] [place] HX: last admission July 2018  According to EMR, pt has had 10 hospitalizations  Trauma HX: denies  Family hx: reports his mother had depression  D&A HX: denies  Medical:   DME: glasses  Tobacco: denies   HX: denies  Access to firearms: denies  UDS Results: normal  PCP: LETHA Bass [facility],   Psych: Dr Gaby Guardado, [facility]  Therapist: denies  ICM/ACT:  denies  Community Supports: mother  Stressors: wife's stroke and hospitalization  Strengths:  strong dick  Coping Skills: listening to [employer]  ROIS Signed: mother Vandana Baker, tel# [phone], step dtr Arleth Pruitt tel# [phone], PCP, [facility]  Treatment Plan Signed: pending  IMM Signed: yes  Upcoming Appointments: none  Covid vaccine received:  Moderna 6/8/21, 5/11/21

## 2022-02-16 PROCEDURE — 99232 SBSQ HOSP IP/OBS MODERATE 35: CPT | Performed by: STUDENT IN AN ORGANIZED HEALTH CARE EDUCATION/TRAINING PROGRAM

## 2022-02-16 RX ADMIN — ESCITALOPRAM OXALATE 10 MG: 10 TABLET ORAL at 08:42

## 2022-02-16 RX ADMIN — ARIPIPRAZOLE 2 MG: 2 TABLET ORAL at 08:42

## 2022-02-16 RX ADMIN — LEVOTHYROXINE SODIUM 75 MCG: 75 TABLET ORAL at 05:40

## 2022-02-16 NOTE — NURSING NOTE
Pt visible ambulating hallways, no interaction with peers noted  Pt is pleasant on approach  Currently pt rates depression 5-10 and anxiety 3-4  Pt reports "I am just thinking of my wife, but I have dick in God"  Pt denies SI, HI, AVH  Pt compliant with medication

## 2022-02-16 NOTE — NURSING NOTE
Pt isolative to room, appears depressed with flat affect  Pt states he is "a little worried but okay"  Pt encouraged to elaborate on his worry but declined  Currently denies depression, anxiety and SI  Will maintain q7min checks

## 2022-02-16 NOTE — PLAN OF CARE
Pt  Refused to attend group as offered today  Problem: Anxiety  Goal: Anxiety is at manageable level  Description: Interventions:  - Assess and monitor patient's anxiety level  - Monitor for signs and symptoms (heart palpitations, chest pain, shortness of breath, headaches, nausea, feeling jumpy, restlessness, irritable, apprehensive)  - Collaborate with interdisciplinary team and initiate plan and interventions as ordered    - [place] patient to unit/surroundings  - Explain treatment plan  - Encourage participation in care  - Encourage verbalization of concerns/fears  - Identify coping mechanisms  - Assist in developing anxiety-reducing skills  - Administer/offer alternative therapies  - Limit or eliminate stimulants  Outcome: Not Progressing

## 2022-02-16 NOTE — PROGRESS NOTES
Progress Note - Behavioral Health   Nikolai Bennett 45 y o  male MRN: [id number]  Unit/Bed#: [facility] 708-68 Encounter: [id number]    Assessment/Plan   Principal Problem:    Recurrent major depressive disorder ([address] )  Active Problems:    Hypothyroidism    Atypical chest pain    Medical clearance for psychiatric admission      Recommended Treatment:   · Continue Lexapro 10 mg daily for anxiety depression   · Consider increasing 2-4 days after initiation  · Continue Abilify 2 mg daily as an adjunct for depression treatment;   · Attempted to reach out to patient's daughter, no answer, re-attempt tomorrow    Continue with group therapy, milieu therapy and occupational therapy  Continue frequent safety checks and vitals per unit protocol  Case discussed with treatment team   Risks, benefits and possible side effects of Medications: Risks, benefits, and possible side effects of medications have been explained to the patient, who verbalizes understanding    ------------------------------------------------------------    Subjective: Per nursing report, Gustavo Gonzáles has been cooperative on the unit and compliant with medications  Interview was conducted with Kenyan-speaking  [id number], [facility]  Today, Gustavo Gonzáles is consenting for safety on the unit  He reports that he is feeling slightly better today, 5/10 depression, reporting it feels improved  On interview, his body posture remained dysphoric, head hung low, looking at ground throughout interview, poor eye contact  He reports that his mood is okay, endorsing good appetite an average sleep  He denies any SI, HI, AVH, anxiety  He is constantly thinking about his wife, hoping she is doing well  He is agreeable with increasing the medications in several days  He reports he feels tense at times  He plans to reach out to his daughter soon      Progress Toward Goals: slow improvement    Psychiatric Review of Systems:  Behavior over the last 24 hours: improved  Sleep: normal  Appetite: adequate  Medication side effects: none verbalized  ROS: Complete review of systems is negative except as noted above      Vital signs in last 24 hours:  Temp:  [97 7 °F (36 5 °C)-97 8 °F (36 6 °C)] 97 7 °F (36 5 °C)  HR:  [89-95] 95  Resp:  [16] 16  BP: (143-159)/(76-82) 151/76    Mental Status Exam:  Appearance:  alert, good eye contact, appears stated age, marginal grooming/hygiene and dysphoric body posture, head hung low, poor eye contact   Behavior:  calm and cooperative   Motor: no abnormal movements and normal gait and balance   Speech:  spontaneous, soft and coherent   Mood:  "Okay"   Affect:  depressed   Thought Process:  Organized, logical, goal-directed   Thought Content: no verbalized delusions or overt paranoia   Perceptual disturbances: no reported hallucinations and does not appear to be responding to internal stimuli at this time   Risk Potential: No active or passive suicidal or homicidal ideation was verbalized during interview   Cognition: oriented to self and situation, appears to be of average intelligence and cognition not formally tested   Insight:  Limited   Judgment: Limited     Current Medications:  Current Facility-Administered Medications   Medication Dose Route Frequency Provider Last Rate    acetaminophen  650 mg Oral Q4H PRN Nilton Damon MD      acetaminophen  650 mg Oral Q4H PRN Nilton Damon MD      acetaminophen  975 mg Oral Q6H PRN Nilton Damon MD      ARIPiprazole  2 mg Oral Daily Jessica Pichardo MD      escitalopram  10 mg Oral Daily Jessica Pichardo MD      hydrOXYzine HCL  25 mg Oral Q6H PRN Max 4/day Nilton Damon MD      hydrOXYzine HCL  50 mg Oral Q6H PRN Max 4/day Nilton Damon MD      levothyroxine  75 mcg Oral Early Morning Nilton Damon MD      LORazepam  1 mg Intramuscular Q6H PRN Max 3/day Nilton Damon MD      OLANZapine  5 mg Intramuscular Q3H PRN Max 3/day Nilton Damon MD  OLANZapine  2 5 mg Oral Q4H PRN Max 6/day Thedore Loretta, MD      OLANZapine  5 mg Oral Q4H PRN Max 3/day Thedore Loretta, MD      OLANZapine  5 mg Oral Q3H PRN Max 3/day Thedore [place], MD      traZODone  50 mg Oral Q6H PRN Max 3/day Thedore [place], MD      traZODone  50 mg Oral HS PRN Thedore [place], MD         Behavioral Health Medications: all current active meds have been reviewed  Changes as in plan section above  Laboratory results:  I have personally reviewed all pertinent laboratory/tests results  No results found for this or any previous visit (from the past 48 hour(s))       Brittany Boone MD

## 2022-02-16 NOTE — TREATMENT TEAM
02/16/22 1014   Team Meeting   Meeting Type Tx Team Meeting   Initial Conference Date 02/16/22   Team Members Present   Team Members Present Physician;Nurse;   Physician Team Member Dr John Bartholomew Team Member Dusty Cervantes Management Team Member Kay Echeverria   Patient/Family Present   Patient Present Yes   Patient's Family Present No     Treatment plan reviewed with pt in Greenlandic  Pt in agreement with treatment plan and goals

## 2022-02-16 NOTE — CASE MANAGEMENT
In basket message sent to Loulou Greenberg requesting follow up appt to be scheduled for pt to see Dr Brody Parker

## 2022-02-16 NOTE — TREATMENT TEAM
02/16/22 0825   Team Meeting   Meeting Type Daily Rounds   Initial Conference Date 02/16/22   Team Members Present   Team Members Present Physician;Nurse;;; Occupational Therapist   Physician Team Member DR Vasile Valle, Dr Mimi Burnette, Dr Rambo Law Team Member [address]  Work Team Member Jie   OT Team Member Boston DALY   Patient/Family Present   Patient Present No   Patient's Family Present No     Isolative to room, preoccupied with wife's health, guarded, denies depression and anxiety, med compliant

## 2022-02-17 PROCEDURE — 99232 SBSQ HOSP IP/OBS MODERATE 35: CPT | Performed by: STUDENT IN AN ORGANIZED HEALTH CARE EDUCATION/TRAINING PROGRAM

## 2022-02-17 RX ORDER — ESCITALOPRAM OXALATE 10 MG/1
20 TABLET ORAL DAILY
Status: DISCONTINUED | OUTPATIENT
Start: 2022-02-18 | End: 2022-02-25 | Stop reason: HOSPADM

## 2022-02-17 RX ADMIN — LEVOTHYROXINE SODIUM 75 MCG: 75 TABLET ORAL at 05:36

## 2022-02-17 RX ADMIN — ARIPIPRAZOLE 2 MG: 2 TABLET ORAL at 08:10

## 2022-02-17 RX ADMIN — ESCITALOPRAM OXALATE 10 MG: 10 TABLET ORAL at 08:10

## 2022-02-17 NOTE — PLAN OF CARE
Problem: Risk for Self Injury/Neglect  Goal: Treatment Goal: Remain safe during length of stay, learn and adopt new coping skills, and be free of self-injurious ideation, impulses and acts at the time of discharge  Outcome: Progressing  Goal: Verbalize thoughts and feelings  Description: Interventions:  - Assess and re-assess patient's lethality and potential for self-injury  - Engage patient in 1:1 interactions, daily, for a minimum of 15 minutes  - Encourage patient to express feelings, fears, frustrations, hopes  - Establish rapport/trust with patient   Outcome: Progressing  Goal: Refrain from harming self  Description: Interventions:  - Monitor patient closely, per order  - Develop a trusting relationship  - Supervise medication ingestion, monitor effects and side effects   Outcome: Progressing     Problem: Depression  Goal: Treatment Goal: Demonstrate behavioral control of depressive symptoms, verbalize feelings of improved mood/affect, and adopt new coping skills prior to discharge  Outcome: Progressing  Goal: Verbalize thoughts and feelings  Description: Interventions:  - Assess and re-assess patient's level of risk   - Engage patient in 1:1 interactions, daily, for a minimum of 15 minutes   - Encourage patient to express feelings, fears, frustrations, hopes   Outcome: Progressing  Goal: Refrain from harming self  Description: Interventions:  - Monitor patient closely, per order   - Supervise medication ingestion, monitor effects and side effects   Outcome: Progressing  Goal: Refrain from isolation  Description: Interventions:  - Develop a trusting relationship   - Encourage socialization   Outcome: Progressing     Problem: Anxiety  Goal: Anxiety is at manageable level  Description: Interventions:  - Assess and monitor patient's anxiety level  - Monitor for signs and symptoms (heart palpitations, chest pain, shortness of breath, headaches, nausea, feeling jumpy, restlessness, irritable, apprehensive)     - Collaborate with interdisciplinary team and initiate plan and interventions as ordered  - [place] patient to unit/surroundings  - Explain treatment plan  - Encourage participation in care  - Encourage verbalization of concerns/fears  - Identify coping mechanisms  - Assist in developing anxiety-reducing skills  - Administer/offer alternative therapies  - Limit or eliminate stimulants  Outcome: Progressing     Problem: Nutrition/Hydration-ADULT  Goal: Nutrient/Hydration intake appropriate for improving, restoring or maintaining nutritional needs  Description: Monitor and assess patient's nutrition/hydration status for malnutrition  Collaborate with interdisciplinary team and initiate plan and interventions as ordered  Monitor patient's weight and dietary intake as ordered or per policy  Utilize nutrition screening tool and intervene as necessary  Determine patient's food preferences and provide high-protein, high-caloric foods as appropriate       INTERVENTIONS:  - Monitor oral intake, urinary output, labs, and treatment plans  - Assess nutrition and hydration status and recommend course of action  - Evaluate amount of meals eaten  - Assist patient with eating if necessary   - Allow adequate time for meals  - Recommend/ encourage appropriate diets, oral nutritional supplements, and vitamin/mineral supplements  - Order, calculate, and assess calorie counts as needed  - Recommend, monitor, and adjust tube feedings and TPN/PPN based on assessed needs  - Assess need for intravenous fluids  - Provide specific nutrition/hydration education as appropriate  - Include patient/family/caregiver in decisions related to nutrition  Outcome: Progressing     Problem: DISCHARGE PLANNING - CARE MANAGEMENT  Goal: Discharge to post-acute care or home with appropriate resources  Description: INTERVENTIONS:  - Conduct assessment to determine patient/family and health care team treatment goals, and need for post-acute services based on payer coverage, community resources, and patient preferences, and barriers to discharge  - Address psychosocial, clinical, and financial barriers to discharge as identified in assessment in conjunction with the patient/family and health care team  - Arrange appropriate level of post-acute services according to patients   needs and preference and payer coverage in collaboration with the physician and health care team  - Communicate with and update the patient/family, physician, and health care team regarding progress on the discharge plan  - Arrange appropriate transportation to post-acute venues  Outcome: Progressing     Problem: Ineffective Coping  Goal: Participates in unit activities  Description: Interventions:  - Provide therapeutic environment   - Provide required programming   - Redirect inappropriate behaviors   Outcome: Progressing

## 2022-02-17 NOTE — CASE MANAGEMENT
CM met with pt, informed pt that Dr Jazz Salazar is no longer at [facility] and only new pt appt available is at [facility]  Pt requesting to see psychiatrist in [facility] area only  CM notified [facility] intake to cancel appt for [place]

## 2022-02-17 NOTE — PLAN OF CARE
Pt  Did engage in one full group session today yet refused to complete a relapse prevention plan    Problem: Ineffective Coping  Goal: Participates in unit activities  Description: Interventions:  - Provide therapeutic environment   - Provide required programming   - Redirect inappropriate behaviors   Outcome: Progressing

## 2022-02-17 NOTE — PLAN OF CARE
Problem: Risk for Self Injury/Neglect  Goal: Treatment Goal: Remain safe during length of stay, learn and adopt new coping skills, and be free of self-injurious ideation, impulses and acts at the time of discharge  Outcome: Progressing  Goal: Verbalize thoughts and feelings  Description: Interventions:  - Assess and re-assess patient's lethality and potential for self-injury  - Engage patient in 1:1 interactions, daily, for a minimum of 15 minutes  - Encourage patient to express feelings, fears, frustrations, hopes  - Establish rapport/trust with patient   Outcome: Progressing  Goal: Refrain from harming self  Description: Interventions:  - Monitor patient closely, per order  - Develop a trusting relationship  - Supervise medication ingestion, monitor effects and side effects   Outcome: Progressing     Problem: Depression  Goal: Treatment Goal: Demonstrate behavioral control of depressive symptoms, verbalize feelings of improved mood/affect, and adopt new coping skills prior to discharge  Outcome: Progressing  Goal: Verbalize thoughts and feelings  Description: Interventions:  - Assess and re-assess patient's level of risk   - Engage patient in 1:1 interactions, daily, for a minimum of 15 minutes   - Encourage patient to express feelings, fears, frustrations, hopes   Outcome: Progressing  Goal: Refrain from harming self  Description: Interventions:  - Monitor patient closely, per order   - Supervise medication ingestion, monitor effects and side effects   Outcome: Progressing  Goal: Refrain from isolation  Description: Interventions:  - Develop a trusting relationship   - Encourage socialization   Outcome: Progressing     Problem: Anxiety  Goal: Anxiety is at manageable level  Description: Interventions:  - Assess and monitor patient's anxiety level  - Monitor for signs and symptoms (heart palpitations, chest pain, shortness of breath, headaches, nausea, feeling jumpy, restlessness, irritable, apprehensive)     - Collaborate with interdisciplinary team and initiate plan and interventions as ordered  - [place] patient to unit/surroundings  - Explain treatment plan  - Encourage participation in care  - Encourage verbalization of concerns/fears  - Identify coping mechanisms  - Assist in developing anxiety-reducing skills  - Administer/offer alternative therapies  - Limit or eliminate stimulants  Outcome: Progressing     Problem: Ineffective Coping  Goal: Participates in unit activities  Description: Interventions:  - Provide therapeutic environment   - Provide required programming   - Redirect inappropriate behaviors   Outcome: Progressing     Problem: Nutrition/Hydration-ADULT  Goal: Nutrient/Hydration intake appropriate for improving, restoring or maintaining nutritional needs  Description: Monitor and assess patient's nutrition/hydration status for malnutrition  Collaborate with interdisciplinary team and initiate plan and interventions as ordered  Monitor patient's weight and dietary intake as ordered or per policy  Utilize nutrition screening tool and intervene as necessary  Determine patient's food preferences and provide high-protein, high-caloric foods as appropriate       INTERVENTIONS:  - Monitor oral intake, urinary output, labs, and treatment plans  - Assess nutrition and hydration status and recommend course of action  - Evaluate amount of meals eaten  - Assist patient with eating if necessary   - Allow adequate time for meals  - Recommend/ encourage appropriate diets, oral nutritional supplements, and vitamin/mineral supplements  - Order, calculate, and assess calorie counts as needed  - Recommend, monitor, and adjust tube feedings and TPN/PPN based on assessed needs  - Assess need for intravenous fluids  - Provide specific nutrition/hydration education as appropriate  - Include patient/family/caregiver in decisions related to nutrition  Outcome: Progressing

## 2022-02-17 NOTE — PROGRESS NOTES
[facility]: identifying strengths group  Pt limited interactions, blunt affect  Pt was able to stay for duration and focus on topic  Pt able to idenitfy strengths  02/17/22 1330   Activity/Group Checklist   Group Other (Comment)  ( recovery: identifying strengths )   Attendance Attended   Attendance Duration (min) 31-45   Interactions Interacted appropriately   Affect/Mood Blunted/flat;Constricted   Goals Achieved Identified feelings; Discussed self-esteem issues; Able to listen to others; Able to engage in interactions

## 2022-02-17 NOTE — NURSING NOTE
Pt calm and cooperative during assessment  Pt denies anxiety  Pt rates depression 4/10 dues to his wife's recent stroke  Pt denies S/I or H/I  Pt denies pain, A/H or V/H  Pt states his last BM was was on 02/15/2022  For his safety, the pt will continue to be monitored Q7 min

## 2022-02-17 NOTE — PROGRESS NOTES
Pt  Did not remain in the dayroom for group but joined peers there again as group was over  Offered to complete a relapse prevention plan with him in Syriac  on a 1-1 basis yet pt declined       02/17/22 1000   Activity/Group Checklist   Group Admission/Discharge  (relapse prevention planning session )   Attendance Refused  (pt  left group as it began )

## 2022-02-17 NOTE — PROGRESS NOTES
Progress Note - Behavioral Health   Rosa Akins 45 y o  male MRN: [id number]  Unit/Bed#: [facility] 378-11 Encounter: [id number]    Assessment/Plan   Principal Problem:    Recurrent major depressive disorder ([address] )  Active Problems:    Hypothyroidism    Atypical chest pain    Medical clearance for psychiatric admission      Recommended Treatment:   · Increase Lexapro 20 mg daily for anxiety depression  · Consider increasing 1-2 days after initiation  · Continue Abilify 2 mg daily as an adjunct depression treatment  · Attempt to reach out to patient's daughter, provide updates     Continue with group therapy, milieu therapy and occupational therapy  Continue frequent safety checks and vitals per unit protocol  Case discussed with treatment team   Risks, benefits and possible side effects of Medications: Risks, benefits, and possible side effects of medications have been explained to the patient, who verbalizes understanding    ------------------------------------------------------------    Subjective: Per nursing report, Rafa Moyer has been cooperative on the unit and compliant with medications  Nursing reports he has been isolative and withdrawn, dysphoric body language  Interview conducted with Indonesian-speaking  [id number]  Today, Rafa Moyer is consenting for safety on the unit  He reports that he feels better today, depression 0/10 from 5/10 yesterday  He reports he has been trying to call his daughter, who has not been able to answer because she is with his wife in the ICU  He reports that he has no SI, HI, AVH, and he would like to be discharged as soon as possible  He understands that we are still increasing his antidepressant medication and we need to help him out of this  Of dysphoria  He understands, and will continue taking medications  He reports that his wife is in critical condition, and he wants to be there with her  He denies any anxiety, except when he thinks about his wife    If discharge, he will go home to his apartment, and then to the hospital     Attempted to call daughter and motherShabbir Senate [phone]  No answer  Will re-attempt tomorrow  Progress Toward Goals:   slow improvement    Psychiatric Review of Systems:  Behavior over the last 24 hours: improved  Sleep: normal  Appetite: adequate  Medication side effects: none verbalized  ROS: Complete review of systems is negative except as noted above      Vital signs in last 24 hours:  Temp:  [97 7 °F (36 5 °C)-98 2 °F (36 8 °C)] 98 2 °F (36 8 °C)  HR:  [] 103  Resp:  [16-18] 18  BP: (123-151)/(76-81) 123/81    Mental Status Exam:  Appearance:  alert, good eye contact, appears stated age, casually dressed, marginal grooming/hygiene and thin   Behavior:  calm, cooperative and Body language less depressed, less hunched over, brighter than previous days, body language still closed off although less than yesterday   Motor: no abnormal movements and normal gait and balance   Speech:  spontaneous, slow, soft and coherent   Mood:  "Improving"   Affect:  Less depressed, dysphoria   Thought Process:  Organized, logical, goal-directed   Thought Content: no verbalized delusions or overt paranoia   Perceptual disturbances: no reported hallucinations and does not appear to be responding to internal stimuli at this time   Risk Potential: No active or passive suicidal or homicidal ideation was verbalized during interview   Cognition: oriented to self and situation, appears to be of average intelligence and cognition not formally tested   Insight:  Limited   Judgment: Limited     Current Medications:  Current Facility-Administered Medications   Medication Dose Route Frequency Provider Last Rate    acetaminophen  650 mg Oral Q4H PRN Michi Powell MD      acetaminophen  650 mg Oral Q4H PRN Michi Powell MD      acetaminophen  975 mg Oral Q6H PRN Michi Powell MD      ARIPiprazole  2 mg Oral Daily [facility] Irving Babin MD      escitalopram  10 mg Oral Daily Vu Pederson MD      hydrOXYzine HCL  25 mg Oral Q6H PRN Max 4/day Erasto MD Eduardo      hydrOXYzine HCL  50 mg Oral Q6H PRN Max 4/day Erasto MD Eduardo      levothyroxine  75 mcg Oral Early Morning Erasto Clark MD      LORazepam  1 mg Intramuscular Q6H PRN Max 3/day Erasto Clark MD      OLANZapine  5 mg Intramuscular Q3H PRN Max 3/day Erasto Clark MD      OLANZapine  2 5 mg Oral Q4H PRN Max 6/day Erasto MD Eduardo      OLANZapine  5 mg Oral Q4H PRN Max 3/day Erasto Clark MD      OLANZapine  5 mg Oral Q3H PRN Max 3/day Erasto Clark MD      traZODone  50 mg Oral Q6H PRN Max 3/day Erasto Clark MD      traZODone  50 mg Oral HS PRN Erasto Clark MD         Behavioral Health Medications: all current active meds have been reviewed  Changes as in plan section above  Laboratory results:  I have personally reviewed all pertinent laboratory/tests results  No results found for this or any previous visit (from the past 48 hour(s))       Vu Pederson MD

## 2022-02-17 NOTE — TREATMENT TEAM
02/17/22 0833   Team Meeting   Meeting Type Daily Rounds   Initial Conference Date 02/17/22   Team Members Present   Team Members Present Physician;;Nurse   Physician Team Member Dr Karyn Ozuna, Dr Nirmala Mccracken, Dr Nicole Pabon Management Team Member Reyna Doll   Patient/Family Present   Patient Present No   Patient's Family Present No     Quiet, withdrawn, preoccupied about wife, depression 4/10, denies all other s/s, slept, lexapro increased this AM, body language improving

## 2022-02-17 NOTE — NURSING NOTE
Pt is calm and cooperative, visible on the unit, ambulating hallways  Pt denies SI/HI/AVH at this time, denies anxiety and depression and states "I am doing better today"-referring to his depression  Pt is compliant with scheduled medications and meals  Pt voiced no complaints, no behavioral issues to be noted  Will monitor for any change in mood or behavior  Q7 min safety checks maintained

## 2022-02-18 PROCEDURE — 99232 SBSQ HOSP IP/OBS MODERATE 35: CPT | Performed by: STUDENT IN AN ORGANIZED HEALTH CARE EDUCATION/TRAINING PROGRAM

## 2022-02-18 RX ADMIN — ARIPIPRAZOLE 2 MG: 2 TABLET ORAL at 08:25

## 2022-02-18 RX ADMIN — ESCITALOPRAM OXALATE 20 MG: 10 TABLET ORAL at 08:25

## 2022-02-18 RX ADMIN — LEVOTHYROXINE SODIUM 75 MCG: 75 TABLET ORAL at 05:41

## 2022-02-18 NOTE — TREATMENT TEAM
02/18/22 0852   Team Meeting   Meeting Type Daily Rounds   Initial Conference Date 02/18/22   Team Members Present   Team Members Present Physician;Nurse;;Occupational Therapist   Physician Team Member Dr Crow Driscoll, Dr Perico Jordan, Dr Sarah Guerrero Management Team Member Erica   OT Team Member Tono DALY   Patient/Family Present   Patient Present No   Patient's Family Present No     Denies all s/s, appears depressed, isolates to self, flat affect, med compliant, eating and sleeping well

## 2022-02-18 NOTE — PLAN OF CARE
Pt did attend portions of all 3 groups today and needed prompting to engage in group discussion  During relaxation group pt, discussed minimal leisure interests except for listening to [id number] [employer]    Problem: Ineffective Coping  Goal: Participates in unit activities  Description: Interventions:  - Provide therapeutic environment   - Provide required programming   - Redirect inappropriate behaviors   Outcome: Progressing

## 2022-02-18 NOTE — NURSING NOTE
Pt out of room briefly for snack, isolated to self  Pt reports "I am doing okay, I'm feeling better"  Pt currently denies s/s  Pt poor eye contact during assessment  Pt polite with staff on approach

## 2022-02-18 NOTE — PROGRESS NOTES
Progress Note - Behavioral Health   Lanny Henderson 45 y o  male MRN: [id number]  Unit/Bed#: [facility] 606-14 Encounter: [id number]    Assessment/Plan   Principal Problem:    Recurrent major depressive disorder ([address] )  Active Problems:    Hypothyroidism    Atypical chest pain    Medical clearance for psychiatric admission      Recommended Treatment:   · Continue Lexapro 20 mg daily for anxiety depression  · Continue Abilify 2 mg daily for augmentation  · Attempt to reach out to patient's daughter, provide updates  Did not answer on 02/18  Continue with group therapy, milieu therapy and occupational therapy  Continue frequent safety checks and vitals per unit protocol  Case discussed with treatment team   Risks, benefits and possible side effects of Medications: Risks, benefits, and possible side effects of medications have been explained to the patient, who verbalizes understanding    ------------------------------------------------------------    Subjective: Per nursing report, Elayne Dupree has been cooperative on the unit and compliant with medications  Patient did not require a Portuguese-speaking  today  Today, Elayne Dupree is consenting for safety on the unit  He reports that he still thinks about his wife a lot, and her state in the hospital   During interview, he continues to bring subject back to his wife who has a serious stroke and his daughter who is taking care of her in the ICU  He reports he has a healthy marriage and loves her a lot, and hope she can recover  He feels like he needs to be with her at the hospital, but understands that he needs to focus on himself while inpatient, and continue taking his medications to assess for side effects  He understands that his concentration and mood will increase over the weekend  On interview, patient posture remained slumped, dysphoric, psychomotor retardation, moderate eye contact  He denied any anxiety, but reports 5/10 depression    Denies SI, HI, AVH  Denies paranoia  He reports that the medications are starting to take effect, and he understands that long-term they will continue taking affect for up to 6 weeks  His concentration is improving, sleep is adequate, and his appetite is improving  He is agreeable to continue medication management, and possibly increase meds next week  He reports that his daughter is not answering the phone for him, but this is okay because she is busy at work throughout the day and with his wife in the ICU at night, so he understands  Progress Toward Goals: slow improvement    Psychiatric Review of Systems:  Behavior over the last 24 hours: improved  Sleep: normal  Appetite: adequate  Medication side effects: none verbalized  ROS: Complete review of systems is negative except as noted above      Vital signs in last 24 hours:  Temp:  [97 6 °F (36 4 °C)-98 1 °F (36 7 °C)] 97 7 °F (36 5 °C)  HR:  [88-96] 88  Resp:  [15-18] 15  BP: (119-135)/(67-90) 119/77    Mental Status Exam:  Appearance:  alert, good eye contact, appears stated age, casually dressed, disheveled and Slumped, dysphoric body language   Behavior:  calm and cooperative   Motor: psychomotor retardation   Speech:  spontaneous, slow, soft and coherent   Mood:  "Depressed 5/10"   Affect:  constricted and depressed   Thought Process:  Organized, logical, goal-directed   Thought Content: no verbalized delusions or overt paranoia   Perceptual disturbances: no reported hallucinations and does not appear to be responding to internal stimuli at this time   Risk Potential: No active or passive suicidal or homicidal ideation was verbalized during interview   Cognition: oriented to self and situation, appears to be of average intelligence and cognition not formally tested   Insight:  Limited   Judgment: Limited     Current Medications:  Current Facility-Administered Medications   Medication Dose Route Frequency Provider Last Rate    acetaminophen  650 mg Oral Q4H PRN Dolores Torres MD      acetaminophen  650 mg Oral Q4H PRN Dolores Torres MD      acetaminophen  975 mg Oral Q6H PRN Dolores Torres MD      ARIPiprazole  2 mg Oral Daily Tona Mc MD      escitalopram  20 mg Oral Daily Tona Mc MD      hydrOXYzine HCL  25 mg Oral Q6H PRN Max 4/day Dolores Torres MD      hydrOXYzine HCL  50 mg Oral Q6H PRN Max 4/day Dolores Torres MD      levothyroxine  75 mcg Oral Early Morning Dolores Torres MD      LORazepam  1 mg Intramuscular Q6H PRN Max 3/day Dolores Torres MD      OLANZapine  5 mg Intramuscular Q3H PRN Max 3/day Dolores Torres MD      OLANZapine  2 5 mg Oral Q4H PRN Max 6/day Dolores Torres MD      OLANZapine  5 mg Oral Q4H PRN Max 3/day Dolores Torres MD      OLANZapine  5 mg Oral Q3H PRN Max 3/day Dolores Torres MD      traZODone  50 mg Oral Q6H PRN Max 3/day Dolores Torres MD      traZODone  50 mg Oral HS PRN Dolores Torres MD         Behavioral Health Medications: all current active meds have been reviewed  Changes as in plan section above  Laboratory results:  I have personally reviewed all pertinent laboratory/tests results  No results found for this or any previous visit (from the past 48 hour(s))       Tona Mc MD

## 2022-02-18 NOTE — PROGRESS NOTES
Pt attended [address] recovery:stressors and d/c needs group  (Pt did leave and return after speaking w/ doctor  ) Pt did aknowledge the stressors of his wife in ICU after stroke and beingat [facility]  Pt noted his protective factor of his Fabiola and spiritual beliefs  Pt did mention bringing comfort and that it being "in God's hands"  Pt noted he needed to work  In his self care to be stronng and getting adequate sleep  Pt expressed that his daughter is a positive support  Pt able to make his needs known with prompting  Pt overwhelmed with decision making process and responsibility  Pt noted the shock of witnessing his wife and what she looked like  Pt needed to be guarded with expression of feelings and stressors  Pt rated that he is "so so" in his recovery process although he is slowly imrpoving       02/18/22 1100   Activity/Group Checklist   Group Other (Comment)  ( recovery:stressors and d/c needs)   Attendance Attended; Other (Comment)  (left w/ doctor)   Attendance Duration (min) 31-45   Interactions Interacted appropriately   Affect/Mood Blunted/flat   Goals Achieved Identified feelings; Identified relapse prevention strategies; Discussed coping strategies; Discussed discharge plans; Able to listen to others; Able to engage in interactions

## 2022-02-18 NOTE — PLAN OF CARE
Problem: Risk for Self Injury/Neglect  Goal: Treatment Goal: Remain safe during length of stay, learn and adopt new coping skills, and be free of self-injurious ideation, impulses and acts at the time of discharge  Outcome: Progressing  Goal: Verbalize thoughts and feelings  Description: Interventions:  - Assess and re-assess patient's lethality and potential for self-injury  - Engage patient in 1:1 interactions, daily, for a minimum of 15 minutes  - Encourage patient to express feelings, fears, frustrations, hopes  - Establish rapport/trust with patient   Outcome: Progressing  Goal: Refrain from harming self  Description: Interventions:  - Monitor patient closely, per order  - Develop a trusting relationship  - Supervise medication ingestion, monitor effects and side effects   Outcome: Progressing     Problem: Depression  Goal: Treatment Goal: Demonstrate behavioral control of depressive symptoms, verbalize feelings of improved mood/affect, and adopt new coping skills prior to discharge  Outcome: Progressing  Goal: Verbalize thoughts and feelings  Description: Interventions:  - Assess and re-assess patient's level of risk   - Engage patient in 1:1 interactions, daily, for a minimum of 15 minutes   - Encourage patient to express feelings, fears, frustrations, hopes   Outcome: Progressing  Goal: Refrain from harming self  Description: Interventions:  - Monitor patient closely, per order   - Supervise medication ingestion, monitor effects and side effects   Outcome: Progressing  Goal: Refrain from isolation  Description: Interventions:  - Develop a trusting relationship   - Encourage socialization   Outcome: Progressing     Problem: Anxiety  Goal: Anxiety is at manageable level  Description: Interventions:  - Assess and monitor patient's anxiety level  - Monitor for signs and symptoms (heart palpitations, chest pain, shortness of breath, headaches, nausea, feeling jumpy, restlessness, irritable, apprehensive)     - Collaborate with interdisciplinary team and initiate plan and interventions as ordered  - [place] patient to unit/surroundings  - Explain treatment plan  - Encourage participation in care  - Encourage verbalization of concerns/fears  - Identify coping mechanisms  - Assist in developing anxiety-reducing skills  - Administer/offer alternative therapies  - Limit or eliminate stimulants  Outcome: Progressing     Problem: Nutrition/Hydration-ADULT  Goal: Nutrient/Hydration intake appropriate for improving, restoring or maintaining nutritional needs  Description: Monitor and assess patient's nutrition/hydration status for malnutrition  Collaborate with interdisciplinary team and initiate plan and interventions as ordered  Monitor patient's weight and dietary intake as ordered or per policy  Utilize nutrition screening tool and intervene as necessary  Determine patient's food preferences and provide high-protein, high-caloric foods as appropriate       INTERVENTIONS:  - Monitor oral intake, urinary output, labs, and treatment plans  - Assess nutrition and hydration status and recommend course of action  - Evaluate amount of meals eaten  - Assist patient with eating if necessary   - Allow adequate time for meals  - Recommend/ encourage appropriate diets, oral nutritional supplements, and vitamin/mineral supplements  - Order, calculate, and assess calorie counts as needed  - Recommend, monitor, and adjust tube feedings and TPN/PPN based on assessed needs  - Assess need for intravenous fluids  - Provide specific nutrition/hydration education as appropriate  - Include patient/family/caregiver in decisions related to nutrition  Outcome: Progressing

## 2022-02-18 NOTE — PROGRESS NOTES
Progress Note - Behavioral Health     Vick Clifton 45 y o  male MRN: [id number]  Unit/Bed#: [facility]94 Encounter: [id number]    Vick Clifton is a [place] and [place] speaking male  He was seen for continuing care and reviewed with treatment team   Pt was pleasant but anxious and preoccupied appearing on approach  He  reported feeling "A little bit" depressed and anxious, "I'm trying to be strong, my wife had a stroke    He mentions that his daughter is supportive and helping him through things, including the likelihood that his wife will be in SNF once she recovers from her stroke  He will have to make plans financially for this to happen  Pt presently denies SI, HI, or psychotic symptoms    Per EMR and floor team,  Medication compliant    Sleep:Good  Appetite:  Suboptimal, Can fluctuate, but overall adequate  Medication side effects: None per Pt    ROS: No complaints per Pt    Labs/Tests: Reviewed    Mental Status Evaluation:  Appearance:  Dressed, Good eye contact   Behavior:  Calm, cooperative, pleasant, with anxious bearing, rubbing hands against his thighs repeatedly, psychomotor retardation   Speech:  Soft, clear, somewhat slow   Mood:  Anxious, Depressed   Affect:  Constricted   Thought Process:  Organized, Goal directed, Ruminative   Associations: Intact associations   Thought Content:  No verbalized delusions   Perceptual Disturbances: Pt denies any hallucinations or paranoia and does not appear to be responding to internal stimuli   Risk Potential: Pt presently denies SI or HI    Sensorium:  Self, birthday, Place, Day of the week, Month, Year   Memory:  short term memory grossly intact   Consciousness:  alert, awake   Attention: Good   Insight:  Fair   Judgment: Fair   Gait/Station: Normal gait/station   Motor Activity: No abnormal movements     Vitals:    02/18/22 0727 02/18/22 1621 02/18/22 2100 02/19/22 0741   BP: 119/77 115/73 126/79 132/74   BP Location: Right arm Right arm Left arm Left arm Pulse: 88 92 84 87   Resp: 15 18 18 16   Temp: 97 7 °F (36 5 °C) 97 9 °F (36 6 °C) 98 3 °F (36 8 °C) 97 6 °F (36 4 °C)   TempSrc: Temporal Temporal Temporal Temporal   SpO2: 97% 98% 99% 99%   Weight:       Height:           Admission on 02/12/2022   Component Date Value    Amph/Meth UR 02/12/2022 Negative     Barbiturate Ur 02/12/2022 Negative     Benzodiazepine Urine 02/12/2022 Negative     Cocaine Urine 02/12/2022 Negative     Methadone Urine 02/12/2022 Negative     Opiate Urine 02/12/2022 Negative     PCP Ur 02/12/2022 Negative     THC Urine 02/12/2022 Negative     Oxycodone Urine 02/12/2022 Negative     EXTBreath Alcohol 02/12/2022 0 000     WBC 02/12/2022 9 70     RBC 02/12/2022 4 90     Hemoglobin 02/12/2022 15 1     Hematocrit 02/12/2022 45 4     MCV 02/12/2022 93     MCH 02/12/2022 30 8     MCHC 02/12/2022 33 3     RDW 02/12/2022 13 6     MPV 02/12/2022 8 9     Platelets 98/26/4207 293     Neutrophils Relative 02/12/2022 77*    Lymphocytes Relative 02/12/2022 12*    Monocytes Relative 02/12/2022 10     Eosinophils Relative 02/12/2022 1     Basophils Relative 02/12/2022 1     Neutrophils Absolute 02/12/2022 7 50     Lymphocytes Absolute 02/12/2022 1 20     Monocytes Absolute 02/12/2022 1 00*    Eosinophils Absolute 02/12/2022 0 00     Basophils Absolute 02/12/2022 0 10     Sodium 02/12/2022 137     Potassium 02/12/2022 3 8     Chloride 02/12/2022 102     CO2 02/12/2022 26     ANION GAP 02/12/2022 9     BUN 02/12/2022 19     Creatinine 02/12/2022 0 96     Glucose 02/12/2022 99     Calcium 02/12/2022 8 8     AST 02/12/2022 26     ALT 02/12/2022 19     Alkaline Phosphatase 02/12/2022 106     Total Protein 02/12/2022 8 9*    Albumin 02/12/2022 4 8     Total Bilirubin 02/12/2022 0 92     eGFR 02/12/2022 90     TSH 3RD GENERATON 02/12/2022 2 950     Color, UA 02/12/2022 Sherly*    Clarity, UA 02/12/2022 Clear     Specific Gravity, UA 02/12/2022 1 025     pH, UA 02/12/2022 6 0     Leukocytes, UA 02/12/2022 Negative     Nitrite, UA 02/12/2022 Negative     Protein, UA 02/12/2022 15 (Trace)*    Glucose, UA 02/12/2022 Negative     Ketones, UA 02/12/2022 >=150 (3+)*    Bilirubin, UA 02/12/2022 Negative     Blood, UA 02/12/2022 25 0*    UROBILINOGEN UA 02/12/2022 Negative     SARS-CoV-2 02/12/2022 Negative     INFLUENZA A PCR 02/12/2022 Negative     INFLUENZA B PCR 02/12/2022 Negative     RSV PCR 02/12/2022 Negative     RBC, UA 02/12/2022 1-2     WBC, UA 02/12/2022 0-1     Epithelial Cells 02/12/2022 Occasional     Bacteria, UA 02/12/2022 Occasional     MUCUS THREADS 02/12/2022 Moderate*    Ventricular Rate 02/12/2022 97     Atrial Rate 02/12/2022 97     NE Interval 02/12/2022 140     QRSD Interval 02/12/2022 78     QT Interval 02/12/2022 368     QTC Interval 02/12/2022 467     P Axis 02/12/2022 64     QRS Axis 02/12/2022 48     T Wave Axis 02/12/2022 69        Progress Toward Goals:  Based on today's interview and review of prior notes, no significant improvement  Escitalopram was increased to 20mg qd yesterday and will observe for affect  Continue present medication regimen    Assessment/Plan   Principal Problem:    Recurrent major depressive disorder ([facility] )  Active Problems:    Hypothyroidism    Atypical chest pain    Medical clearance for psychiatric admission      Recommended Treatment: Continue with pharmacotherapy, group therapy, milieu therapy and occupational therapy    The patient will be maintained on the following medications:  Current Facility-Administered Medications   Medication Dose Route Frequency Provider Last Rate    acetaminophen  650 mg Oral Q4H PRN Odilon Alaniz MD      acetaminophen  650 mg Oral Q4H PRN Odilon Alaniz MD      acetaminophen  975 mg Oral Q6H PRN Odilon Alaniz MD      ARIPiprazole  2 mg Oral Daily Reji Thomas MD      escitalopram  20 mg Oral Daily Reji Thomas MD      hydrOXYzine HCL  25 mg Oral Q6H PRN Max 4/day Erasto Clark MD      hydrOXYzine HCL  50 mg Oral Q6H PRN Max 4/day Erasto Clark MD      levothyroxine  75 mcg Oral Early Morning Erasto Clark MD      LORazepam  1 mg Intramuscular Q6H PRN Max 3/day Erasto Clark MD      OLANZapine  5 mg Intramuscular Q3H PRN Max 3/day Erasto Clark MD      OLANZapine  2 5 mg Oral Q4H PRN Max 6/day Erasto Clark MD      OLANZapine  5 mg Oral Q4H PRN Max 3/day Erasto Clark MD      OLANZapine  5 mg Oral Q3H PRN Max 3/day Erasto Clark MD      traZODone  50 mg Oral Q6H PRN Max 3/day Erasto Clark MD      traZODone  50 mg Oral HS PRN Erasto Clark MD         Risks, benefits and possible side effects of Medications:   Risks, benefits, and possible side effects of medications explained to patient and patient verbalizes understanding

## 2022-02-18 NOTE — NURSING NOTE
Patient spoken to in [address] [place]  with assistance of   Patient rates anxiety 3/4 he states he is nervous about his wife but his daughter is helping with decision making  He denies all other symptoms  He is calm and cooperative  Present in the milieu  Anxious in affect

## 2022-02-19 PROCEDURE — 99232 SBSQ HOSP IP/OBS MODERATE 35: CPT | Performed by: PHYSICIAN ASSISTANT

## 2022-02-19 RX ADMIN — ESCITALOPRAM OXALATE 20 MG: 10 TABLET ORAL at 08:05

## 2022-02-19 RX ADMIN — ARIPIPRAZOLE 2 MG: 2 TABLET ORAL at 08:05

## 2022-02-19 RX ADMIN — LEVOTHYROXINE SODIUM 75 MCG: 75 TABLET ORAL at 06:12

## 2022-02-19 NOTE — PLAN OF CARE
Problem: Risk for Self Injury/Neglect  Goal: Treatment Goal: Remain safe during length of stay, learn and adopt new coping skills, and be free of self-injurious ideation, impulses and acts at the time of discharge  Outcome: Progressing  Goal: Verbalize thoughts and feelings  Description: Interventions:  - Assess and re-assess patient's lethality and potential for self-injury  - Engage patient in 1:1 interactions, daily, for a minimum of 15 minutes  - Encourage patient to express feelings, fears, frustrations, hopes  - Establish rapport/trust with patient   Outcome: Progressing  Goal: Refrain from harming self  Description: Interventions:  - Monitor patient closely, per order  - Develop a trusting relationship  - Supervise medication ingestion, monitor effects and side effects   Outcome: Progressing     Problem: Depression  Goal: Treatment Goal: Demonstrate behavioral control of depressive symptoms, verbalize feelings of improved mood/affect, and adopt new coping skills prior to discharge  Outcome: Progressing  Goal: Verbalize thoughts and feelings  Description: Interventions:  - Assess and re-assess patient's level of risk   - Engage patient in 1:1 interactions, daily, for a minimum of 15 minutes   - Encourage patient to express feelings, fears, frustrations, hopes   Outcome: Progressing  Goal: Refrain from harming self  Description: Interventions:  - Monitor patient closely, per order   - Supervise medication ingestion, monitor effects and side effects   Outcome: Progressing  Goal: Refrain from isolation  Description: Interventions:  - Develop a trusting relationship   - Encourage socialization   Outcome: Progressing     Problem: Anxiety  Goal: Anxiety is at manageable level  Description: Interventions:  - Assess and monitor patient's anxiety level  - Monitor for signs and symptoms (heart palpitations, chest pain, shortness of breath, headaches, nausea, feeling jumpy, restlessness, irritable, apprehensive)     - Collaborate with interdisciplinary team and initiate plan and interventions as ordered  - [place] patient to unit/surroundings  - Explain treatment plan  - Encourage participation in care  - Encourage verbalization of concerns/fears  - Identify coping mechanisms  - Assist in developing anxiety-reducing skills  - Administer/offer alternative therapies  - Limit or eliminate stimulants  Outcome: Progressing     Problem: Nutrition/Hydration-ADULT  Goal: Nutrient/Hydration intake appropriate for improving, restoring or maintaining nutritional needs  Description: Monitor and assess patient's nutrition/hydration status for malnutrition  Collaborate with interdisciplinary team and initiate plan and interventions as ordered  Monitor patient's weight and dietary intake as ordered or per policy  Utilize nutrition screening tool and intervene as necessary  Determine patient's food preferences and provide high-protein, high-caloric foods as appropriate       INTERVENTIONS:  - Monitor oral intake, urinary output, labs, and treatment plans  - Assess nutrition and hydration status and recommend course of action  - Evaluate amount of meals eaten  - Assist patient with eating if necessary   - Allow adequate time for meals  - Recommend/ encourage appropriate diets, oral nutritional supplements, and vitamin/mineral supplements  - Order, calculate, and assess calorie counts as needed  - Recommend, monitor, and adjust tube feedings and TPN/PPN based on assessed needs  - Assess need for intravenous fluids  - Provide specific nutrition/hydration education as appropriate  - Include patient/family/caregiver in decisions related to nutrition  Outcome: Progressing

## 2022-02-19 NOTE — NURSING NOTE
Patient is calm and cooperative  He is peripheral in milieu  Anxious in affect  Does not report any needs or concerns

## 2022-02-19 NOTE — PROGRESS NOTES
Progress Note - Behavioral Health     Pedro Pablo Hernandez 45 y o  male MRN: [id number]  Unit/Bed#: [facility]-24 Encounter: [id number]    Pedro Pablo Hernandez was seen for continuing care and reviewed with treatment team   Pt was calm, with a depressed and preoccupied appearance, (No RIS)  He is not very spontaneous but reported feeling "Worried about my wife, trying to make some decisions" and "A little sad" for the same reason  He states his sleep was not optimal last night due to yelling on the unit which made him nervous, otherwise sleeps well  He presently denies SI, HI, or psychotic Sxs  Per EMR and floor team, Pt has remained calm, cooperative, and medication compliant on unit  He was isolative last evening, but appropriate with peers when visible on the unit      Sleep:Not very good last night, but usually good  Appetite: Adequate  Medication side effects: None per Pt    ROS: No complaints per Pt, (All ROS Negative)    Labs/Tests: Reviewed    Mental Status Evaluation:  Appearance:  Dressed, Fair eye contact, looks down frequently today   Behavior:  Calm, cooperative, pleasant with quietly anxious bearing   Speech:  Soft clear, somewhat slow, not very spontaneous but willing to answer questions   Mood:  Anxious, Depressed   Affect:  Constricted   Thought Process:  Organized, Goal directed, Ruminative   Associations: Intact associations   Thought Content:  No verbalized delusions; mainly his wife is on his mind   Perceptual Disturbances: Pt denies any hallucinations or paranoia and does not appear to be responding to internal stimuli   Risk Potential: Pt presently denies SI or HI    Sensorium:  Self, birthday, Place, Day of the week, Month, Year   Memory:  short term memory grossly intact   Consciousness:  alert, awake   Attention: Good   Insight:  Fair   Judgment: Fair   Gait/Station: Normal gait/station   Motor Activity: No abnormal movements     Vitals:    02/19/22 0741 02/19/22 1557 02/19/22 2004 02/20/22 0720 BP: 132/74 124/72 131/75 118/77   BP Location: Left arm Right arm Right arm Left arm   Pulse: 87 98 84 94   Resp: 16 16 16 16   Temp: 97 6 °F (36 4 °C) 98 4 °F (36 9 °C) 98 °F (36 7 °C) 98 4 °F (36 9 °C)   TempSrc: Temporal Temporal Temporal Tympanic   SpO2: 99% 98% 97% 98%   Weight:       Height:           Admission on 02/12/2022   Component Date Value    Amph/Meth UR 02/12/2022 Negative     Barbiturate Ur 02/12/2022 Negative     Benzodiazepine Urine 02/12/2022 Negative     Cocaine Urine 02/12/2022 Negative     Methadone Urine 02/12/2022 Negative     Opiate Urine 02/12/2022 Negative     PCP Ur 02/12/2022 Negative     THC Urine 02/12/2022 Negative     Oxycodone Urine 02/12/2022 Negative     EXTBreath Alcohol 02/12/2022 0 000     WBC 02/12/2022 9 70     RBC 02/12/2022 4 90     Hemoglobin 02/12/2022 15 1     Hematocrit 02/12/2022 45 4     MCV 02/12/2022 93     MCH 02/12/2022 30 8     MCHC 02/12/2022 33 3     RDW 02/12/2022 13 6     MPV 02/12/2022 8 9     Platelets 71/92/6835 293     Neutrophils Relative 02/12/2022 77*    Lymphocytes Relative 02/12/2022 12*    Monocytes Relative 02/12/2022 10     Eosinophils Relative 02/12/2022 1     Basophils Relative 02/12/2022 1     Neutrophils Absolute 02/12/2022 7 50     Lymphocytes Absolute 02/12/2022 1 20     Monocytes Absolute 02/12/2022 1 00*    Eosinophils Absolute 02/12/2022 0 00     Basophils Absolute 02/12/2022 0 10     Sodium 02/12/2022 137     Potassium 02/12/2022 3 8     Chloride 02/12/2022 102     CO2 02/12/2022 26     ANION GAP 02/12/2022 9     BUN 02/12/2022 19     Creatinine 02/12/2022 0 96     Glucose 02/12/2022 99     Calcium 02/12/2022 8 8     AST 02/12/2022 26     ALT 02/12/2022 19     Alkaline Phosphatase 02/12/2022 106     Total Protein 02/12/2022 8 9*    Albumin 02/12/2022 4 8     Total Bilirubin 02/12/2022 0 92     eGFR 02/12/2022 90     TSH 3RD GENERATON 02/12/2022 2 950     Color, UA 02/12/2022 Sherly*   [place] Clarity, UA 02/12/2022 Clear     Specific Gravity, UA 02/12/2022 1 025     pH, UA 02/12/2022 6 0     Leukocytes, UA 02/12/2022 Negative     Nitrite, UA 02/12/2022 Negative     Protein, UA 02/12/2022 15 (Trace)*    Glucose, UA 02/12/2022 Negative     Ketones, UA 02/12/2022 >=150 (3+)*    Bilirubin, UA 02/12/2022 Negative     Blood, UA 02/12/2022 25 0*    UROBILINOGEN UA 02/12/2022 Negative     SARS-CoV-2 02/12/2022 Negative     INFLUENZA A PCR 02/12/2022 Negative     INFLUENZA B PCR 02/12/2022 Negative     RSV PCR 02/12/2022 Negative     RBC, UA 02/12/2022 1-2     WBC, UA 02/12/2022 0-1     Epithelial Cells 02/12/2022 Occasional     Bacteria, UA 02/12/2022 Occasional     MUCUS THREADS 02/12/2022 Moderate*    Ventricular Rate 02/12/2022 97     Atrial Rate 02/12/2022 97     UT Interval 02/12/2022 140     QRSD Interval 02/12/2022 78     QT Interval 02/12/2022 368     QTC Interval 02/12/2022 467     P Axis 02/12/2022 64     QRS Axis 02/12/2022 48     T Wave Axis 02/12/2022 69        Progress Toward Goals:  Based on today's interview and review of prior notes, Pt has remained stable over the weekend but no significant improvement  Continue the present regimen and observe for effect  Assessment/Plan   Principal Problem:    Recurrent major depressive disorder (HCC)  Active Problems:    Hypothyroidism    Atypical chest pain    Medical clearance for psychiatric admission      Recommended Treatment: Continue with pharmacotherapy, group therapy, milieu therapy and occupational therapy    The patient will be maintained on the following medications:  Current Facility-Administered Medications   Medication Dose Route Frequency Provider Last Rate    acetaminophen  650 mg Oral Q4H PRN Bandar Parada MD      acetaminophen  650 mg Oral Q4H PRN Bandar Parada MD      acetaminophen  975 mg Oral Q6H PRN Bandar Parada MD      ARIPiprazole  2 mg Oral Daily MD Maria Luisa Carroll Render In Strict Bullet Format?: No Detail Level: Zone escitalopram  20 mg Oral Daily Delores Primrose, MD      hydrOXYzine HCL  25 mg Oral Q6H PRN Max 4/day Kelly Nichols MD      hydrOXYzine HCL  50 mg Oral Q6H PRN Max 4/day Kelly Nichols MD      levothyroxine  75 mcg Oral Early Morning Kelly Nichols MD      LORazepam  1 mg Intramuscular Q6H PRN Max 3/day Kelly Nichols MD      OLANZapine  5 mg Intramuscular Q3H PRN Max 3/day Kelly Nichols MD      OLANZapine  2 5 mg Oral Q4H PRN Max 6/day Kelly Nichols MD      OLANZapine  5 mg Oral Q4H PRN Max 3/day Kelly Nichols MD      OLANZapine  5 mg Oral Q3H PRN Max 3/day Kelly Nichols MD      traZODone  50 mg Oral Q6H PRN Max 3/day Kelly Nichols MD      traZODone  50 mg Oral HS PRN Kelly Nichols MD         Risks, benefits and possible side effects of Medications:   Risks, benefits, and possible side effects of medications explained to patient and patient verbalizes understanding Initiate Treatment: Nystatin topical cream Initiate Treatment: Clindamycin 300mg QD x 8 weeks Continue Regimen: Clindamycin solution once daily, clobetasol solution QD PRN for itching

## 2022-02-19 NOTE — NURSING NOTE
Patient was isolative to his room but came out to the milieu during snack  VSS  Denies all psych s/s  No complaints offered  Had snack  Calm and cooperative with care  Safety checks ongoing

## 2022-02-20 PROCEDURE — 99232 SBSQ HOSP IP/OBS MODERATE 35: CPT | Performed by: PHYSICIAN ASSISTANT

## 2022-02-20 RX ADMIN — LEVOTHYROXINE SODIUM 75 MCG: 75 TABLET ORAL at 06:16

## 2022-02-20 RX ADMIN — ARIPIPRAZOLE 2 MG: 2 TABLET ORAL at 08:22

## 2022-02-20 RX ADMIN — ESCITALOPRAM OXALATE 20 MG: 10 TABLET ORAL at 08:22

## 2022-02-20 NOTE — NURSING NOTE
Patient was isolative to his room  VSS  Denies all psych s/s  No compliant offered Calm and cooperative with care  Safety checks ongoing

## 2022-02-20 NOTE — NURSING NOTE
Patient is calm and cooperative  He rates depression 5/10 and anxiety 2/4  His speech is soft and slow  He is peripheral in the milieu, mostly withdrawn to his room

## 2022-02-21 PROCEDURE — 99232 SBSQ HOSP IP/OBS MODERATE 35: CPT | Performed by: STUDENT IN AN ORGANIZED HEALTH CARE EDUCATION/TRAINING PROGRAM

## 2022-02-21 RX ORDER — ARIPIPRAZOLE 5 MG/1
5 TABLET ORAL DAILY
Status: DISCONTINUED | OUTPATIENT
Start: 2022-02-22 | End: 2022-02-25 | Stop reason: HOSPADM

## 2022-02-21 RX ADMIN — ESCITALOPRAM OXALATE 20 MG: 10 TABLET ORAL at 08:41

## 2022-02-21 RX ADMIN — ARIPIPRAZOLE 2 MG: 2 TABLET ORAL at 08:41

## 2022-02-21 RX ADMIN — LEVOTHYROXINE SODIUM 75 MCG: 75 TABLET ORAL at 06:17

## 2022-02-21 NOTE — NURSING NOTE
Patient was isolative to his room and only came out during snack  VSS  Denies all psych s/s though he appears depressed  No complaints offered  Calm and cooperative with care  Safety checks ongoing

## 2022-02-21 NOTE — TREATMENT TEAM
02/21/22 0803   Team Meeting   Meeting Type Daily Rounds   Initial Conference Date 02/21/22   Team Members Present   Team Members Present Physician;Nurse;;Occupational Therapist   Physician Team Member Dr Paul Blue, Dr Joy Munroe Team Member [facility] Management Team Member Erica   OT Team Member Alejandro DALY   Patient/Family Present   Patient Present No   Patient's Family Present No     Depression 5/10, anxiety 2/4, appears depressed, minimizes symptoms, abilify to be increased

## 2022-02-21 NOTE — PLAN OF CARE
Problem: Risk for Self Injury/Neglect  Goal: Treatment Goal: Remain safe during length of stay, learn and adopt new coping skills, and be free of self-injurious ideation, impulses and acts at the time of discharge  Outcome: Progressing  Goal: Verbalize thoughts and feelings  Description: Interventions:  - Assess and re-assess patient's lethality and potential for self-injury  - Engage patient in 1:1 interactions, daily, for a minimum of 15 minutes  - Encourage patient to express feelings, fears, frustrations, hopes  - Establish rapport/trust with patient   Outcome: Progressing  Goal: Refrain from harming self  Description: Interventions:  - Monitor patient closely, per order  - Develop a trusting relationship  - Supervise medication ingestion, monitor effects and side effects   Outcome: Progressing     Problem: Depression  Goal: Treatment Goal: Demonstrate behavioral control of depressive symptoms, verbalize feelings of improved mood/affect, and adopt new coping skills prior to discharge  Outcome: Progressing  Goal: Verbalize thoughts and feelings  Description: Interventions:  - Assess and re-assess patient's level of risk   - Engage patient in 1:1 interactions, daily, for a minimum of 15 minutes   - Encourage patient to express feelings, fears, frustrations, hopes   Outcome: Progressing  Goal: Refrain from harming self  Description: Interventions:  - Monitor patient closely, per order   - Supervise medication ingestion, monitor effects and side effects   Outcome: Progressing  Goal: Refrain from isolation  Description: Interventions:  - Develop a trusting relationship   - Encourage socialization   Outcome: Progressing

## 2022-02-21 NOTE — PROGRESS NOTES
Progress Note - Behavioral Health   Dolly Reyes 45 y o  male MRN: [id number]  Unit/Bed#Joseph Mai 140-02 Encounter: [id number]    The patient was seen for continuing care and reviewed with treatment team  Patient appears depressed, has been minimizing his symptoms  He remains isolative, mainly seen sitting on his bed, minimal interaction with his peers, but very pleasant on approach  This morning during the meeting, he remains preoccupied with discharge but willing to continue working with team members for medication adjustment  Mood is depressed, PMR present, speech is soft and slow  He reports he has been unable to get in touch with his mother or get updates bout how his wife is doing   Sleep- improving  Appetite- fluctuates %  Energy: low, isolates  No suicidal or homicidal ideations  No EPS, denies any side effects of medication      /79 (BP Location: Left arm)   Pulse 82   Temp (!) 97 2 °F (36 2 °C) (Temporal)   Resp 15   Ht 5' 8" (1 727 m)   Wt 69 kg (152 lb 3 2 oz)   SpO2 100%   BMI 23 14 kg/m²     Current Mental Status Evaluation:  Appearance:  Marginal/poor hygiene, Self care remains poor and impaired   Behavior:  calm, cooperative, guarded and psychomotor retardation   Mood:  anxious and depressed, improved eye contact   Affect: constricted   Speech: Soft and slow   Thought Process:  Goal directed and coherent   Thought Content:  Does not verbalize delusional material, preoccupied with discharge and ruminations about wife's health   Perceptual Disturbances: Denies hallucinations and does not appear to be responding to internal stimuli   Risk Potential: No suicidal or homicidal ideation   Orientation:   Patient is alert, awake and oriented x 3   Insight , Judgement and impulse control limited    Current Facility-Administered Medications   Medication Dose Route Frequency Provider Last Rate    acetaminophen  650 mg Oral Q4H PRN Claribel Nicholson MD      acetaminophen  650 mg Oral Q4H PRN Albertina Grey MD      acetaminophen  975 mg Oral Q6H PRN Albertina Grey MD      Ramandeep Edwards ON 2/22/2022] ARIPiprazole  5 mg Oral Daily Albertina Grey MD      escitalopram  20 mg Oral Daily Simone Kawasaki, MD      hydrOXYzine HCL  25 mg Oral Q6H PRN Max 4/day Albertina Grey MD      hydrOXYzine HCL  50 mg Oral Q6H PRN Max 4/day Albertina Grey MD      levothyroxine  75 mcg Oral Early Morning Albertina Grey MD      LORazepam  1 mg Intramuscular Q6H PRN Max 3/day Albertina Grey MD      OLANZapine  5 mg Intramuscular Q3H PRN Max 3/day Albertina Grey MD      OLANZapine  2 5 mg Oral Q4H PRN Max 6/day lAbertina Grey MD      OLANZapine  5 mg Oral Q4H PRN Max 3/day Albertina Grey MD      OLANZapine  5 mg Oral Q3H PRN Max 3/day Albertina Grey MD      traZODone  50 mg Oral Q6H PRN Max 3/day Albertina Grey MD      traZODone  50 mg Oral HS PRN Albertina Grey MD         No results found for this or any previous visit (from the past 168 hour(s))  Progress Toward Goals: progressing but still observed severe clinical signs of depression    Assessment     Principal Problem:    Recurrent major depressive disorder (HCC)  Active Problems:    Hypothyroidism    Atypical chest pain    Medical clearance for psychiatric admission        Plan :    - Medications;   Psychiatric: Abilify 5mg daily for depression augmentation                       Continue Lexapro 20mg daily     Medical:per SLIM    -Therapy: occupational therapy, milieu and group therapy  - Legal: 201   -Disposition: Home when stable

## 2022-02-21 NOTE — PROGRESS NOTES
02/21/22 1000 02/21/22 1100 02/21/22 1330   Activity/Group Checklist   Group Community meeting  (seated stretches ) Exercise  (video was offered ) Life Skills  (music and presidential coloring/word search task )   Attendance Attended Refused Refused   Attendance Duration (min) 0-15  --   --    Interactions Did not interact  --   --    Affect/Mood Blunted/flat  --   --    Goals Achieved Able to listen to others; Able to engage in interactions  --   --

## 2022-02-21 NOTE — PLAN OF CARE
Pt isolative yet did join morning group briefly  Polite yet not motivated to join seated exercise nor seasonal/musical word search group    Problem: Ineffective Coping  Goal: Participates in unit activities  Description: Interventions:  - Provide therapeutic environment   - Provide required programming   - Redirect inappropriate behaviors   Outcome: Progressing

## 2022-02-21 NOTE — NURSING NOTE
Pt anxious and constricted but pleasant on approach and med-compliant with good appetite at breakfast, poor at lunch  VSS  Gait steady  Monitored for safety and support

## 2022-02-22 PROCEDURE — 99232 SBSQ HOSP IP/OBS MODERATE 35: CPT | Performed by: STUDENT IN AN ORGANIZED HEALTH CARE EDUCATION/TRAINING PROGRAM

## 2022-02-22 RX ADMIN — ESCITALOPRAM OXALATE 20 MG: 10 TABLET ORAL at 08:06

## 2022-02-22 RX ADMIN — LEVOTHYROXINE SODIUM 75 MCG: 75 TABLET ORAL at 05:56

## 2022-02-22 RX ADMIN — ARIPIPRAZOLE 5 MG: 5 TABLET ORAL at 08:06

## 2022-02-22 NOTE — CASE MANAGEMENT
Cm met with pt, reviewed outpatient referrals  Pt requesting referral to either [facility] or [facility]  Call made to [facility]ula [facility] requesting c/b  Call made to pt's step dtr tana Armendariz  Call made to pt's mother Corijame Boss, tel# [phone]  Pt's mother reports she does not have update on pt's wife because hospital will not release information to her  Cori Boss reports she has also been trying to reach Kala with no luck  Cori Boss reports Ellenenriqueta Arguetaari did remove pt's dogs from the home to care for them  Cori Boss reports pt typically is independent and able to manage household and self although when pt is depressed, pt does not shower, does not leave home, does not take medications  Pt's mother reports concerns that pt has history of noncompliance with medications  Pt's mother reports concern that pt is still depressed based on phone call with pt today  Pt's mother reports pt remains preoccupied with status of wife but unable to obtain info  Pt's mother reports living few blocks from pt, is able to check on pt but not during all medication times  Pt's mother reports she and her  are pt's only supports  Pt's mother reports pt's  has the contact info for hospital for pt to obtain information on wife  Call made to [facility], submitted request for new patient appt

## 2022-02-22 NOTE — NURSING NOTE
Pt anxious and blunted but pleasant and med-compliant with good appetite and steady gait  VSS  Monitored for safety and support

## 2022-02-22 NOTE — CMS CERTIFICATION NOTE
Certification: Based upon physical, mental and social evaluations, I certify that inpatient psychiatric services are medically necessary for this patient for a duration of 12 midnights for the treatment of  MDD without psychosis

## 2022-02-22 NOTE — NURSING NOTE
Pt visible on unit, quiet, keeps to self  Denies current SI  Cooperative, answers questions appropriately  Quiet in room  Appears to sleep

## 2022-02-22 NOTE — PROGRESS NOTES
Pt attended [address] recovery:self reflection, needs and wants group  Pt did not engage during group  Pt after group did note that his wife will be going to [employer]  Pt did note that he needs to focus on his own mh recovery but endorsed depressive symptoms  Anxious and blunt affect  02/22/22 1100   Activity/Group Checklist   Group Other (Comment)  ([facility] recovery:self reflection, needs and wants)   Attendance Attended   Attendance Duration (min) 31-45   Interactions Other (Comment)  (needing promptingi)   Affect/Mood Blunted/flat   Goals Achieved Identified feelings; Identified relapse prevention strategies; Discussed coping strategies; Able to listen to others; Able to self-disclose; Able to recieve feedback

## 2022-02-22 NOTE — TREATMENT TEAM
02/22/22 0825   Team Meeting   Meeting Type Daily Rounds   Initial Conference Date 02/22/22   Team Members Present   Team Members Present Physician;Nurse;;Occupational Therapist;   Physician Team Member Dr Reggie Burns, Dr Jeff Quinn, Dr Patti Rivera Team Member Capirce Hicks Work Team Member Jie   OT Team Member Orestes DALY   Patient/Family Present   Patient Present No   Patient's Family Present No     Isolative to room, no socialization, upset over wife, appears depressed, meds cont to be adjusted

## 2022-02-22 NOTE — PLAN OF CARE
Pt  Attended one of two groups  Generally quiet in sessions yet did approach staff to get a comment card in effort to give the unit staff his thanks for all of their help    Problem: Ineffective Coping  Goal: Participates in unit activities  Description: Interventions:  - Provide therapeutic environment   - Provide required programming   - Redirect inappropriate behaviors   Outcome: Progressing

## 2022-02-22 NOTE — PLAN OF CARE
Problem: Risk for Self Injury/Neglect  Goal: Treatment Goal: Remain safe during length of stay, learn and adopt new coping skills, and be free of self-injurious ideation, impulses and acts at the time of discharge  Outcome: Progressing  Goal: Verbalize thoughts and feelings  Description: Interventions:  - Assess and re-assess patient's lethality and potential for self-injury  - Engage patient in 1:1 interactions, daily, for a minimum of 15 minutes  - Encourage patient to express feelings, fears, frustrations, hopes  - Establish rapport/trust with patient   Outcome: Progressing  Goal: Refrain from harming self  Description: Interventions:  - Monitor patient closely, per order  - Develop a trusting relationship  - Supervise medication ingestion, monitor effects and side effects   Outcome: Progressing     Problem: Depression  Goal: Treatment Goal: Demonstrate behavioral control of depressive symptoms, verbalize feelings of improved mood/affect, and adopt new coping skills prior to discharge  Outcome: Progressing  Goal: Verbalize thoughts and feelings  Description: Interventions:  - Assess and re-assess patient's level of risk   - Engage patient in 1:1 interactions, daily, for a minimum of 15 minutes   - Encourage patient to express feelings, fears, frustrations, hopes   Outcome: Progressing  Goal: Refrain from harming self  Description: Interventions:  - Monitor patient closely, per order   - Supervise medication ingestion, monitor effects and side effects   Outcome: Progressing  Goal: Refrain from isolation  Description: Interventions:  - Develop a trusting relationship   - Encourage socialization   Outcome: Progressing     Problem: Anxiety  Goal: Anxiety is at manageable level  Description: Interventions:  - Assess and monitor patient's anxiety level  - Monitor for signs and symptoms (heart palpitations, chest pain, shortness of breath, headaches, nausea, feeling jumpy, restlessness, irritable, apprehensive)     - Collaborate with interdisciplinary team and initiate plan and interventions as ordered  - [place] patient to unit/surroundings  - Explain treatment plan  - Encourage participation in care  - Encourage verbalization of concerns/fears  - Identify coping mechanisms  - Assist in developing anxiety-reducing skills  - Administer/offer alternative therapies  - Limit or eliminate stimulants  Outcome: Progressing     Problem: Ineffective Coping  Goal: Participates in unit activities  Description: Interventions:  - Provide therapeutic environment   - Provide required programming   - Redirect inappropriate behaviors   Outcome: Progressing     Problem: Nutrition/Hydration-ADULT  Goal: Nutrient/Hydration intake appropriate for improving, restoring or maintaining nutritional needs  Description: Monitor and assess patient's nutrition/hydration status for malnutrition  Collaborate with interdisciplinary team and initiate plan and interventions as ordered  Monitor patient's weight and dietary intake as ordered or per policy  Utilize nutrition screening tool and intervene as necessary  Determine patient's food preferences and provide high-protein, high-caloric foods as appropriate       INTERVENTIONS:  - Monitor oral intake, urinary output, labs, and treatment plans  - Assess nutrition and hydration status and recommend course of action  - Evaluate amount of meals eaten  - Assist patient with eating if necessary   - Allow adequate time for meals  - Recommend/ encourage appropriate diets, oral nutritional supplements, and vitamin/mineral supplements  - Order, calculate, and assess calorie counts as needed  - Recommend, monitor, and adjust tube feedings and TPN/PPN based on assessed needs  - Assess need for intravenous fluids  - Provide specific nutrition/hydration education as appropriate  - Include patient/family/caregiver in decisions related to nutrition  Outcome: Progressing     Problem: DISCHARGE PLANNING - CARE MANAGEMENT  Goal: Discharge to post-acute care or home with appropriate resources  Description: INTERVENTIONS:  - Conduct assessment to determine patient/family and health care team treatment goals, and need for post-acute services based on payer coverage, community resources, and patient preferences, and barriers to discharge  - Address psychosocial, clinical, and financial barriers to discharge as identified in assessment in conjunction with the patient/family and health care team  - Arrange appropriate level of post-acute services according to patients   needs and preference and payer coverage in collaboration with the physician and health care team  - Communicate with and update the patient/family, physician, and health care team regarding progress on the discharge plan  - Arrange appropriate transportation to post-acute venues  Outcome: Progressing

## 2022-02-22 NOTE — CASE MANAGEMENT
CM met with pt, reviewed conversation with pt's mother  Pt reports he has the 's tel# in his cell phone and will call  upon his return home  Pt reports he was able to obtain update regarding his wife  Pt reports his wife is to be discharged to [facility] rehab and that his wife is doing well  Pt appeared more relaxed  Pt reports his plans upon d/c are to return to the [facility] to help and spend time with his mother  Pt reports he will listen to [id number] [facility] to relax  Pt focused on d/c home  CM informed pt of pending call back from [facility] and [facility] for outpatient treatment

## 2022-02-22 NOTE — PROGRESS NOTES
Progress Note - Behavioral Health   Ciara Glasgow 45 y o  male MRN: [id number]  Unit/Bed#Jake Patel 205-44 Encounter: [id number]    The patient was seen for continuing care and reviewed with treatment team  Remains isolative to his room, intermittently paces the unit  He minimizes his symptoms  He declined use of Lao [place]   States he feels better but yet affect is very constricted, noted to have PMR, poor grooming  Sits alone in his room, moves between the chair and his bed, a bible sen open at his bedside  He states he spoke with his mother and was informed his wife is better  and she will be discharged to rehab at " [facility]"  Patient states he is normally a quiet person and loves solitary activities  He is tolerating recent adjustment  of Abilify dosage  No PES noted    /74 (BP Location: Left arm)   Pulse 86   Temp 97 8 °F (36 6 °C) (Temporal)   Resp 16   Ht 5' 8" (1 727 m)   Wt 67 7 kg (149 lb 3 2 oz)   SpO2 96%   BMI 22 69 kg/m²     Current Mental Status Evaluation:  Appearance:  Marginal/poor hygiene and Poor eye contact   Behavior:  calm, cooperative, guarded, psychomotor retardation and Superficial   Mood:  Depressed   Affect: constricted   Speech: Soft and slow    Thought Process:  Goal directed and coherent   Thought Content:  Does not verbalize delusional material   Perceptual Disturbances: Denies hallucinations and does not appear to be responding to internal stimuli   Risk Potential: No suicidal or homicidal ideation   Orientation:   Patient is alert, awake and oriented x 3     Progress Toward Goals: No significant events in the past 24 hours  Principal Problem:    Recurrent major depressive disorder ([address] )  Active Problems:    Hypothyroidism    Atypical chest pain    Medical clearance for psychiatric admission    Discharge planning update: The patient will return to previous living arrangement    Recommended Treatment: Continue with pharmacotherapy, group therapy, milieu therapy and occupational therapy    The patient will be maintained on the following medications:  Current Facility-Administered Medications   Medication Dose Route Frequency Provider Last Rate    acetaminophen  650 mg Oral Q4H PRN Marysol Rivera MD      acetaminophen  650 mg Oral Q4H PRN Marysol Rivera MD      acetaminophen  975 mg Oral Q6H PRN Marysol Rivera MD      ARIPiprazole  5 mg Oral Daily Marysol Rivera MD      escitalopram  20 mg Oral Daily Jamaal Figueroa MD      hydrOXYzine HCL  25 mg Oral Q6H PRN Max 4/day Marysol Rivera MD      hydrOXYzine HCL  50 mg Oral Q6H PRN Max 4/day Marysol Rivera MD      levothyroxine  75 mcg Oral Early Morning Marysol Rivera MD      LORazepam  1 mg Intramuscular Q6H PRN Max 3/day Marysol Rivera MD      OLANZapine  5 mg Intramuscular Q3H PRN Max 3/day Marysol Rivera MD      OLANZapine  2 5 mg Oral Q4H PRN Max 6/day Marysol Rivera MD      OLANZapine  5 mg Oral Q4H PRN Max 3/day Marysol Rivera MD      OLANZapine  5 mg Oral Q3H PRN Max 3/day Marysol Rivera MD      traZODone  50 mg Oral Q6H PRN Max 3/day Marysol Rivera MD      traZODone  50 mg Oral HS PRN Marysol Rivera MD

## 2022-02-23 PROCEDURE — 99232 SBSQ HOSP IP/OBS MODERATE 35: CPT | Performed by: STUDENT IN AN ORGANIZED HEALTH CARE EDUCATION/TRAINING PROGRAM

## 2022-02-23 RX ADMIN — ESCITALOPRAM OXALATE 20 MG: 10 TABLET ORAL at 08:49

## 2022-02-23 RX ADMIN — LEVOTHYROXINE SODIUM 75 MCG: 75 TABLET ORAL at 05:39

## 2022-02-23 RX ADMIN — ARIPIPRAZOLE 5 MG: 5 TABLET ORAL at 08:49

## 2022-02-23 NOTE — PLAN OF CARE
Pt calm when in groups yet only attended 1/3 today  Pt spontaneously shared with staff that his wife is getting better    Problem: Ineffective Coping  Goal: Participates in unit activities  Description: Interventions:  - Provide therapeutic environment   - Provide required programming   - Redirect inappropriate behaviors   Outcome: Progressing

## 2022-02-23 NOTE — NURSING NOTE
Pt is visible on the unit with minimal interaction  Pt is medication compliant and reported feeling better today  Pt had  50%  intake at dinner time  Pt also reported depression got better but  unable to rate  Pt denies all other symptoms  Will continue to monitor

## 2022-02-23 NOTE — PROGRESS NOTES
Progress Note - Behavioral Health   Krishna Briceno 45 y o  male MRN: [id number]  Unit/Bed#Denys Maxwell 512-13 Encounter: [id number]    The patient was seen for continuing care and reviewed with treatment team  Patient remains isolative, but came out for one group  He appears slightly brighter today  Reports he has been showering, still persistent PMR,  Affect blunt  With Mild reactivity today  Happy because his wife is getting better  No paranoia, delusions elicited  No AVH  Sleep- Improved  Appetite- fluctuates eats %  Energy: Low, isolative   No suicidal or homicidal ideations  No EPS, denies any side effects of medication  /95 (BP Location: Left arm)   Pulse 96   Temp 98 2 °F (36 8 °C) (Temporal)   Resp 18   Ht 5' 8" (1 727 m)   Wt 67 7 kg (149 lb 3 2 oz)   SpO2 98%   BMI 22 69 kg/m²     Current Mental Status Evaluation:  Appearance:  Marginal/poor hygiene, poor oral hygiene   Behavior:  calm, cooperative and friendly, PMR   Mood:  anxious and depressed   Affect: constricted   Speech: Soft, hesitant, slow   Thought Process:  Poverty of thoughts   Thought Content:  Does not verbalize delusional material   Perceptual Disturbances: Denies hallucinations and does not appear to be responding to internal stimuli   Risk Potential: No suicidal or homicidal ideation   Orientation:   Patient is alert, awake and oriented x 3   He is     No results found for this or any previous visit (from the past 168 hour(s))  Progress Toward Goals: progressing but still isolative , showing objective signs of depression   Tolerating the medications    Assessment      Principal Problem:    Recurrent major depressive disorder (HCC)  Active Problems:    Hypothyroidism    Atypical chest pain    Medical clearance for psychiatric admission        Plan :    - Medications;   Psychiatric: Lexapro 20mg daily                       Abilify 5mg daily for augmentation     Medical ; per SLIM   continue levothyroxine    -Therapy: occupational therapy, milieu and group therapy  - Legal: 201   -Disposition:home, late this week or early next week

## 2022-02-23 NOTE — NURSING NOTE
Pt blunted with fair appetite  VSS  Med-compliant  Attended group  Minimal interactions  Monitored for safety and support

## 2022-02-23 NOTE — NURSING NOTE
Patient is pleasant and cooperative  Compliant with meals and scheduled medications  Currently denies SI/HI/AH/VH  Ambulates with steady gait  Will continue to monitor frequently

## 2022-02-23 NOTE — TREATMENT TEAM
02/23/22 0835   Team Meeting   Meeting Type Daily Rounds   Initial Conference Date 02/23/22   Team Members Present   Team Members Present Physician;Nurse;;Occupational Therapist   Physician Team Member Dr Christie Huitron, Dr James Matute Management Team Member Erica   OT Team Member Nader DALY   Patient/Family Present   Patient Present No   Patient's Family Present No     Remains anxious, blunted affect, preoccupied with wife, denies all s/s, med compliant, slept, remains isolative, attending group

## 2022-02-23 NOTE — PROGRESS NOTES
Pt attended one of three groups and reported to staff that his wife is getting better and he is grateful     02/23/22 1000 02/23/22 1100 02/23/22 1330   Activity/Group Checklist   Group Community meeting Other (Comment)  ([address] recovery: signs and symptoms) Exercise   Attendance Attended Did not attend Refused   Attendance Duration (min) 16-30  --   --    Interactions Did not interact  --   --    Affect/Mood Blunted/flat  --   --    Goals Achieved Able to listen to others  --   --

## 2022-02-23 NOTE — CASE MANAGEMENT
Rec'd call from [facility] intake  for [employer]  Pt scheduled for intake with Jamaican speaking counselor Lucila Cruz [place] 3/1 at [facility]     Call made to pt's PCP office,  Primary Care, [facility] requesting c/b to schedule appt

## 2022-02-24 PROCEDURE — 99232 SBSQ HOSP IP/OBS MODERATE 35: CPT | Performed by: STUDENT IN AN ORGANIZED HEALTH CARE EDUCATION/TRAINING PROGRAM

## 2022-02-24 RX ADMIN — ESCITALOPRAM OXALATE 20 MG: 10 TABLET ORAL at 08:13

## 2022-02-24 RX ADMIN — LEVOTHYROXINE SODIUM 75 MCG: 75 TABLET ORAL at 05:55

## 2022-02-24 RX ADMIN — ARIPIPRAZOLE 5 MG: 5 TABLET ORAL at 08:13

## 2022-02-24 NOTE — CASE MANAGEMENT
Call made to pt's mother Ashley Layton, informed her of pt's d/c tomorrow   Pt's mother to  pt between 12-1pm

## 2022-02-24 NOTE — PLAN OF CARE
Pt  Attended group as directed and completed a realistic relapse prevention plan  Pt  notably more positive as he reports that his wife's condition is improving    Problem: Ineffective Coping  Goal: Participates in unit activities  Description: Interventions:  - Provide therapeutic environment   - Provide required programming   - Redirect inappropriate behaviors   Outcome: Progressing

## 2022-02-24 NOTE — PROGRESS NOTES
02/24/22 1000   Activity/Group Checklist   Group Admission/Discharge  (completed relapse prevention plan appropriately )   Attendance Attended   Attendance Duration (min) 16-30   Interactions Interacted appropriately  (spontaneously contributed to conversation)   Affect/Mood Appropriate;Normal range   Goals Achieved Identified relapse prevention strategies; Discussed coping strategies; Discussed discharge plans; Able to engage in interactions; Able to listen to others; Other (Comment)  (pt  was provided with a coping tip sheet )

## 2022-02-24 NOTE — PLAN OF CARE
Problem: Risk for Self Injury/Neglect  Goal: Treatment Goal: Remain safe during length of stay, learn and adopt new coping skills, and be free of self-injurious ideation, impulses and acts at the time of discharge  Outcome: Progressing  Goal: Verbalize thoughts and feelings  Description: Interventions:  - Assess and re-assess patient's lethality and potential for self-injury  - Engage patient in 1:1 interactions, daily, for a minimum of 15 minutes  - Encourage patient to express feelings, fears, frustrations, hopes  - Establish rapport/trust with patient   Outcome: Progressing  Goal: Refrain from harming self  Description: Interventions:  - Monitor patient closely, per order  - Develop a trusting relationship  - Supervise medication ingestion, monitor effects and side effects   Outcome: Progressing     Problem: Depression  Goal: Treatment Goal: Demonstrate behavioral control of depressive symptoms, verbalize feelings of improved mood/affect, and adopt new coping skills prior to discharge  Outcome: Progressing  Goal: Verbalize thoughts and feelings  Description: Interventions:  - Assess and re-assess patient's level of risk   - Engage patient in 1:1 interactions, daily, for a minimum of 15 minutes   - Encourage patient to express feelings, fears, frustrations, hopes   Outcome: Progressing  Goal: Refrain from harming self  Description: Interventions:  - Monitor patient closely, per order   - Supervise medication ingestion, monitor effects and side effects   Outcome: Progressing  Goal: Refrain from isolation  Description: Interventions:  - Develop a trusting relationship   - Encourage socialization   Outcome: Progressing     Problem: Anxiety  Goal: Anxiety is at manageable level  Description: Interventions:  - Assess and monitor patient's anxiety level  - Monitor for signs and symptoms (heart palpitations, chest pain, shortness of breath, headaches, nausea, feeling jumpy, restlessness, irritable, apprehensive)     - Collaborate with interdisciplinary team and initiate plan and interventions as ordered  - [place] patient to unit/surroundings  - Explain treatment plan  - Encourage participation in care  - Encourage verbalization of concerns/fears  - Identify coping mechanisms  - Assist in developing anxiety-reducing skills  - Administer/offer alternative therapies  - Limit or eliminate stimulants  Outcome: Progressing     Problem: Nutrition/Hydration-ADULT  Goal: Nutrient/Hydration intake appropriate for improving, restoring or maintaining nutritional needs  Description: Monitor and assess patient's nutrition/hydration status for malnutrition  Collaborate with interdisciplinary team and initiate plan and interventions as ordered  Monitor patient's weight and dietary intake as ordered or per policy  Utilize nutrition screening tool and intervene as necessary  Determine patient's food preferences and provide high-protein, high-caloric foods as appropriate       INTERVENTIONS:  - Monitor oral intake, urinary output, labs, and treatment plans  - Assess nutrition and hydration status and recommend course of action  - Evaluate amount of meals eaten  - Assist patient with eating if necessary   - Allow adequate time for meals  - Recommend/ encourage appropriate diets, oral nutritional supplements, and vitamin/mineral supplements  - Order, calculate, and assess calorie counts as needed  - Recommend, monitor, and adjust tube feedings and TPN/PPN based on assessed needs  - Assess need for intravenous fluids  - Provide specific nutrition/hydration education as appropriate  - Include patient/family/caregiver in decisions related to nutrition  Outcome: Progressing     Problem: DISCHARGE PLANNING - CARE MANAGEMENT  Goal: Discharge to post-acute care or home with appropriate resources  Description: INTERVENTIONS:  - Conduct assessment to determine patient/family and health care team treatment goals, and need for post-acute services based on payer coverage, community resources, and patient preferences, and barriers to discharge  - Address psychosocial, clinical, and financial barriers to discharge as identified in assessment in conjunction with the patient/family and health care team  - Arrange appropriate level of post-acute services according to patients   needs and preference and payer coverage in collaboration with the physician and health care team  - Communicate with and update the patient/family, physician, and health care team regarding progress on the discharge plan  - Arrange appropriate transportation to post-acute venues  Outcome: Progressing     Problem: Ineffective Coping  Goal: Participates in unit activities  Description: Interventions:  - Provide therapeutic environment   - Provide required programming   - Redirect inappropriate behaviors   Outcome: Progressing

## 2022-02-24 NOTE — DISCHARGE INSTR - APPOINTMENTS
Justine Boyd RN, our [employer], will be calling you after your discharge, on the phone number that you provided  She will be available as an additional support, if needed  If you wish to speak with her, you may contact Markus Saavedra at [phone]

## 2022-02-24 NOTE — CASE MANAGEMENT
Call made to pt's mother Aleksandar Yang, tel# [phone]  Pt's mother reports she spoke with pt yesterday and today and found pt to be improved  Pt's mother reports pt's wife is doing better and will be going to [facility] rehab but not yet  Pt's mother reports pt's step children are not allowing pt to visit wife due to pt's depression effecting visit  Pt's mother reports pt's wife made her dtr [address], not pt  Pt's mother reports she is also not able to speak with pt's step children; [facility] pt's wife in hospital  Pt's mother reports pt's wife is totally paralyzed, upper and lower body  Pt's wife had feeding tube and will open eyes when spoken to  Pt's mother reports she has been encouraging pt to socialize here in hospital and tells pt he must take his medications; concerns that pt will stop taking medications upon d/c  Pt's mother reports pt has always been "weak in the mind" since he was a child  Pt's mother reports she is only available to  pt tomorrow and will be able to take pt to grocery store tomorrow and get pt set up at home, not available Monday  Pt's mother reports she will take pt to  [address] to get medications  Cm informed pt's mother of pt's appt at [facility] on 3/1 at [address] [facility]  Pt's mother reports she will ensure pt gets to appt

## 2022-02-24 NOTE — NURSING NOTE
Pt constricted but pleasant and med-compliant with good appetite and steady gait  VSS  Monitored for safety and support

## 2022-02-24 NOTE — DISCHARGE INSTR - OTHER ORDERS
[facility] [address]  [facility]  Tel# [phone]    Triggers you have identified during your hospitalization that led to your admission distressed mood include your wife's status in hospital  Coping skills you have identified during your hospitalization include listening to [address] and reading the Bible  If you are unable to deal with your distressed mood alone please contact [facility] ([facility]) AT [place] # [phone], dial 535 or go to the nearest emergency center  [facility] Crisis Hotline: 487.997.3923  *National Suicide Prevention Lifeline:  [address] on Mental Illness ([place]) HELPLINE: 569.640.9804/Website: www nancy Famo.us  *Substance Abuse and Mental Health Services Administration(SAMHSA) [employer], which is a confidential, free, 24-hour-a-day, 365-day-a-year, information service for individuals and family members facing mental health and/or substance use disorders  This service provides referrals to local treatment facilities, support groups, and community-based organizations  Callers can also order free publications and other information  Call 7-375.824.4921/Website: www [facility] gov  *United Way 2-1-1: This is a toll free, confidential, 24-hour-a-day service which connects you to a community  in your area who can help you find services and resources that are available to you locally and provide critical services that can improve and save lives  Call: 211  /Website: https://oliver dolan/     [facility] Residents:   *Emergencies: If you are experiencing a mental health emergency, you may call the [address] [place] [address] [facility] 24 hours a day, 7 days per week at [phone] or 925  *Telephone Support Services:   When you need someone to listen, the [employer] is available from the hours of 6am- 2am, 7 days a week   No one is available from the hours of 2am-6am  A representative can be reached at [phone]  *Provider Appointments:   Walk-in appointments are available Monday-Thursday 9am-11am and Wednesday 1:00-3:00 at [facility] Room 60  The address [place], [place] and the telephone number is [phone]  Please refer to handout for what is necessary to take with you

## 2022-02-24 NOTE — NURSING NOTE
Pt is calm and cooperative with care  Pt is visible on the unit with minimal interaction with peers  Pt had good intake at dinner time  Pt reports minimal depression and anxiety  Looking forward to d/c tomorrow  Will continue to monitor

## 2022-02-24 NOTE — PROGRESS NOTES
Progress Note - Behavioral Health   Fay Dodson 45 y o  male MRN: [id number]  Unit/Bed#Neema Mendiola 863-14 Encounter: [id number]    The patient was seen for continuing care and reviewed with treatment team  Patient is feeling better  Seen attending a group this morning, visibly brighter, affect reactive but some persistent PMR noted  Grooming is improved  Says his wife is better but is paralyzed  She has been opening her eyes  He is happy about his current medication regimen, states he understands the importance of compliance after discharge  Able to discuss coping strategies such as listening to Jew music, talking with his , participating in [employer] activities  Sleep- good  Appetite- good  Energy: Improving  No suicidal or homicidal ideations  No EPS, denies any side effects of medication      /82 (BP Location: Left arm)   Pulse 80   Temp (!) 97 2 °F (36 2 °C) (Temporal)   Resp 17   Ht 5' 8" (1 727 m)   Wt 67 7 kg (149 lb 3 2 oz)   SpO2 100%   BMI 22 69 kg/m²     Current Mental Status Evaluation:  Appearance:  Adequate hygiene and grooming   Behavior:  calm and cooperative   Mood:  improving" feel; better"   Affect: constricted   Speech: Normal rate and Normal volume   Thought Process:  Goal directed and coherent   Thought Content:  Does not verbalize delusional material   Perceptual Disturbances: Denies hallucinations and does not appear to be responding to internal stimuli   Risk Potential: No suicidal or homicidal ideation   Orientation:   Patient is alert,awake and oriented x 3     Current Facility-Administered Medications   Medication Dose Route Frequency Provider Last Rate    acetaminophen  650 mg Oral Q4H PRN Chandu Kc MD      acetaminophen  650 mg Oral Q4H PRN Chandu Kc MD      acetaminophen  975 mg Oral Q6H PRN Chandu Kc MD      ARIPiprazole  5 mg Oral Daily Chandu Kc MD      escitalopram  20 mg Oral Daily Shameka Slaughter MD  hydrOXYzine HCL  25 mg Oral Q6H PRN Max 4/day Bandar Parada MD      hydrOXYzine HCL  50 mg Oral Q6H PRN Max 4/day Bandar Parada MD      levothyroxine  75 mcg Oral Early Morning Bandar Parada MD      LORazepam  1 mg Intramuscular Q6H PRN Max 3/day Bandar Parada MD      OLANZapine  5 mg Intramuscular Q3H PRN Max 3/day Bandar Parada MD      OLANZapine  2 5 mg Oral Q4H PRN Max 6/day Bandar Parada MD      OLANZapine  5 mg Oral Q4H PRN Max 3/day Bandar Parada MD      OLANZapine  5 mg Oral Q3H PRN Max 3/day Bandar Parada MD      traZODone  50 mg Oral Q6H PRN Max 3/day Bandar Parada MD      traZODone  50 mg Oral HS PRN Bandar Parada MD       Recent Results (from the past 336 hour(s))   ECG 12 lead    Collection Time: 02/12/22 11:14 AM   Result Value Ref Range    Ventricular Rate 97 BPM    Atrial Rate 97 BPM    DC Interval 140 ms    QRSD Interval 78 ms    QT Interval 368 ms    QTC Interval 467 ms    P [place] 64 degrees    QRS Axis 48 degrees    T Wave Axis 69 degrees   CBC and differential    Collection Time: 02/12/22 11:20 AM   Result Value Ref Range    WBC 9 70 4 50 - 11 00 Thousand/uL    RBC 4 90 4 50 - 5 90 Million/uL    Hemoglobin 15 1 13 5 - 17 5 g/dL    Hematocrit 45 4 41 0 - 53 0 %    MCV 93 80 - 100 fL    MCH 30 8 26 0 - 34 0 pg    MCHC 33 3 31 0 - 36 0 g/dL    RDW 13 6 <15 3 %    MPV 8 9 8 9 - 12 7 fL    Platelets 894 255 - 234 Thousands/uL    Neutrophils Relative 77 (H) 45 - 65 %    Lymphocytes Relative 12 (L) 25 - 45 %    Monocytes Relative 10 1 - 10 %    Eosinophils Relative 1 0 - 6 %    Basophils Relative 1 0 - 1 %    Neutrophils Absolute 7 50 1 80 - 7 80 Thousands/µL    Lymphocytes Absolute 1 20 0 50 - 4 00 Thousands/µL    Monocytes Absolute 1 00 (H) 0 20 - 0 90 Thousand/µL    Eosinophils Absolute 0 00 0 00 - 0 40 Thousand/µL    Basophils Absolute 0 10 0 00 - 0 10 Thousands/µL   Comprehensive metabolic panel Collection Time: 02/12/22 11:20 AM   Result Value Ref Range    Sodium 137 137 - 147 mmol/L    Potassium 3 8 3 6 - 5 0 mmol/L    Chloride 102 97 - 108 mmol/L    CO2 26 22 - 30 mmol/L    ANION GAP 9 5 - 14 mmol/L    BUN 19 5 - 25 mg/dL    Creatinine 0 96 0 70 - 1 50 mg/dL    Glucose 99 70 - 99 mg/dL    Calcium 8 8 8 4 - 10 2 mg/dL    AST 26 17 - 59 U/L    ALT 19 <50 U/L    Alkaline Phosphatase 106 43 - 122 U/L    Total Protein 8 9 (H) 5 9 - 8 4 g/dL    Albumin 4 8 3 0 - 5 2 g/dL    Total Bilirubin 0 92 <1 30 mg/dL    eGFR 90 ml/min/1 73sq m   TSH    Collection Time: 02/12/22 11:20 AM   Result Value Ref Range    TSH 3RD GENERATON 2 950 0 465 - 4 680 uIU/mL   COVID/FLU/RSV - 2 hour TAT    Collection Time: 02/12/22 11:20 AM    Specimen: Nose; Nares   Result Value Ref Range    SARS-CoV-2 Negative Negative    INFLUENZA A PCR Negative Negative    INFLUENZA B PCR Negative Negative    RSV PCR Negative Negative   POCT alcohol breath test    Collection Time: 02/12/22 11:21 AM   Result Value Ref Range    EXTBreath Alcohol 0 000    Rapid drug screen, urine    Collection Time: 02/12/22  7:12 PM   Result Value Ref Range    Amph/Meth UR Negative Negative    Barbiturate Ur Negative Negative    Benzodiazepine Urine Negative Negative    Cocaine Urine Negative Negative    Methadone Urine Negative Negative    Opiate Urine Negative Negative    PCP Ur Negative Negative    THC Urine Negative Negative    Oxycodone Urine Negative Negative   UA w Reflex to Microscopic w Reflex to Culture    Collection Time: 02/12/22  7:12 PM    Specimen: Urine, Other   Result Value Ref Range    Color, UA Sherly (A) Straw, Yellow, Pale Yellow    Clarity, UA Clear Clear, Other    Specific Gravity, UA 1 025 1 003 - 1 040    pH, UA 6 0 4 5, 5 0, 5 5, 6 0, 6 5, 7 0, 7 5, 8 0    Leukocytes, UA Negative Negative    Nitrite, UA Negative Negative    Protein, UA 15 (Trace) (A) Negative mg/dl    Glucose, UA Negative Negative mg/dl    Ketones, UA >=150 (3+) (A) Negative mg/dl    Bilirubin, UA Negative Negative    Blood, UA 25 0 (A) Negative    UROBILINOGEN UA Negative 1 0, Negative mg/dL   Urine Microscopic    Collection Time: 02/12/22  7:12 PM   Result Value Ref Range    RBC, UA 1-2 None Seen, 0-1, 1-2, 2-4, 0-5 /hpf    WBC, UA 0-1 None Seen, 0-1, 1-2, 0-5, 2-4 /hpf    Epithelial Cells Occasional None Seen, Occasional /hpf    Bacteria, UA Occasional None Seen, Occasional /hpf    MUCUS THREADS Moderate (A) None Seen     Progress Toward Goals: progressing    Assessment     Principal Problem:    Recurrent major depressive disorder (HCC)  Active Problems:    Hypothyroidism    Atypical chest pain    Medical clearance for psychiatric admission        Plan :    - Medications;   Psychiatric: Lexapro 20mg daily                        Abilify 5mg daily                           Medical; per SLIM    -Therapy: occupational therapy, milieu and group therapy  - Legal: [facility] tomorrow morning to home, Mother is in agreement

## 2022-02-24 NOTE — TREATMENT TEAM
02/24/22 0835   Team Meeting   Meeting Type Daily Rounds   Initial Conference Date 02/24/22   Team Members Present   Team Members Present Physician;Nurse;   Physician Team Member Dr Asha Mac, Dr Sharmin Zarate Team Member [facility] Management Team Member Sarika Gonzalez   Patient/Family Present   Patient Present No   Patient's Family Present No     Appears less depressed, more interactive with peers and select staff, improved sleep, reports depression better, unable to rate, slept

## 2022-02-25 LAB
ALBUMIN SERPL BCP-MCNC: 4.1 G/DL (ref 3–5.2)
ALP SERPL-CCNC: 92 U/L (ref 43–122)
ALT SERPL W P-5'-P-CCNC: 22 U/L
ANION GAP SERPL CALCULATED.3IONS-SCNC: 5 MMOL/L (ref 5–14)
AST SERPL W P-5'-P-CCNC: 25 U/L (ref 17–59)
BASOPHILS # BLD AUTO: 0.1 THOUSANDS/ΜL (ref 0–0.1)
BASOPHILS NFR BLD AUTO: 1 % (ref 0–1)
BILIRUB SERPL-MCNC: 0.19 MG/DL
BUN SERPL-MCNC: 17 MG/DL (ref 5–25)
CALCIUM SERPL-MCNC: 8.7 MG/DL (ref 8.4–10.2)
CHLORIDE SERPL-SCNC: 101 MMOL/L (ref 97–108)
CO2 SERPL-SCNC: 31 MMOL/L (ref 22–30)
CREAT SERPL-MCNC: 0.83 MG/DL (ref 0.7–1.5)
EOSINOPHIL # BLD AUTO: 0.2 THOUSAND/ΜL (ref 0–0.4)
EOSINOPHIL NFR BLD AUTO: 2 % (ref 0–6)
ERYTHROCYTE [DISTWIDTH] IN BLOOD BY AUTOMATED COUNT: 14.2 %
EST. AVERAGE GLUCOSE BLD GHB EST-MCNC: 105 MG/DL
GFR SERPL CREATININE-BSD FRML MDRD: 101 ML/MIN/1.73SQ M
GLUCOSE SERPL-MCNC: 91 MG/DL (ref 70–99)
HBA1C MFR BLD: 5.3 %
HCT VFR BLD AUTO: 43.9 % (ref 41–53)
HGB BLD-MCNC: 14.5 G/DL (ref 13.5–17.5)
LYMPHOCYTES # BLD AUTO: 1.5 THOUSANDS/ΜL (ref 0.5–4)
LYMPHOCYTES NFR BLD AUTO: 19 % (ref 25–45)
MCH RBC QN AUTO: 30.8 PG (ref 26–34)
MCHC RBC AUTO-ENTMCNC: 33 G/DL (ref 31–36)
MCV RBC AUTO: 93 FL (ref 80–100)
MONOCYTES # BLD AUTO: 0.8 THOUSAND/ΜL (ref 0.2–0.9)
MONOCYTES NFR BLD AUTO: 10 % (ref 1–10)
NEUTROPHILS # BLD AUTO: 5.4 THOUSANDS/ΜL (ref 1.8–7.8)
NEUTS SEG NFR BLD AUTO: 67 % (ref 45–65)
PLATELET # BLD AUTO: 273 THOUSANDS/UL (ref 150–450)
PMV BLD AUTO: 9.5 FL (ref 8.9–12.7)
POTASSIUM SERPL-SCNC: 4.7 MMOL/L (ref 3.6–5)
PROT SERPL-MCNC: 7.5 G/DL (ref 5.9–8.4)
RBC # BLD AUTO: 4.7 MILLION/UL (ref 4.5–5.9)
RPR SER QL: NORMAL
SODIUM SERPL-SCNC: 137 MMOL/L (ref 137–147)
WBC # BLD AUTO: 8 THOUSAND/UL (ref 4.5–11)

## 2022-02-25 PROCEDURE — 86592 SYPHILIS TEST NON-TREP QUAL: CPT | Performed by: STUDENT IN AN ORGANIZED HEALTH CARE EDUCATION/TRAINING PROGRAM

## 2022-02-25 PROCEDURE — 80053 COMPREHEN METABOLIC PANEL: CPT | Performed by: STUDENT IN AN ORGANIZED HEALTH CARE EDUCATION/TRAINING PROGRAM

## 2022-02-25 PROCEDURE — 85025 COMPLETE CBC W/AUTO DIFF WBC: CPT | Performed by: STUDENT IN AN ORGANIZED HEALTH CARE EDUCATION/TRAINING PROGRAM

## 2022-02-25 PROCEDURE — 83036 HEMOGLOBIN GLYCOSYLATED A1C: CPT | Performed by: STUDENT IN AN ORGANIZED HEALTH CARE EDUCATION/TRAINING PROGRAM

## 2022-02-25 PROCEDURE — 99238 HOSP IP/OBS DSCHRG MGMT 30/<: CPT | Performed by: STUDENT IN AN ORGANIZED HEALTH CARE EDUCATION/TRAINING PROGRAM

## 2022-02-25 RX ORDER — ESCITALOPRAM OXALATE 20 MG/1
TABLET ORAL
Qty: 30 TABLET | Refills: 0 | Status: SHIPPED | OUTPATIENT
Start: 2022-02-25 | End: 2022-03-02 | Stop reason: SDUPTHER

## 2022-02-25 RX ORDER — ARIPIPRAZOLE 5 MG/1
5 TABLET ORAL DAILY
Qty: 30 TABLET | Refills: 0 | Status: SHIPPED | OUTPATIENT
Start: 2022-02-26 | End: 2022-03-02 | Stop reason: SDUPTHER

## 2022-02-25 RX ORDER — LEVOTHYROXINE SODIUM 0.07 MG/1
75 TABLET ORAL EVERY MORNING
Qty: 30 TABLET | Refills: 0 | Status: SHIPPED | OUTPATIENT
Start: 2022-02-25 | End: 2022-03-02 | Stop reason: SDUPTHER

## 2022-02-25 RX ADMIN — ESCITALOPRAM OXALATE 20 MG: 10 TABLET ORAL at 08:45

## 2022-02-25 RX ADMIN — ARIPIPRAZOLE 5 MG: 5 TABLET ORAL at 08:45

## 2022-02-25 RX ADMIN — LEVOTHYROXINE SODIUM 75 MCG: 75 TABLET ORAL at 05:29

## 2022-02-25 NOTE — DISCHARGE SUMMARY
Discharge Summary - [address] [facility] 45 y o  male MRN: [id number]  Unit/Bed#: [facility] 948-18 Encounter: [id number]     Admission Date: 2/12/2022         Discharge Date: 2/25/2022 12:30 PM    Attending Psychiatrist:  Michael Rothman MD  Reason for Admission/HPI:   copied from my initial H and P note and Dr Nancy Astorga  Patient is a 45year old Male, , history of MDD , previously treated with Lexapro 10mg daily with good results, but has been non- compliant x 1 year  No known history of suicide attempt, Illicit drug use or violence       He self presented due to worsening depressive symptoms x 10days since his wife suffered a stroke and admitted to [facility] ICU  He denies AVH, no delusions elicited  He has reports trying to cope at home through prayers, listening to Taoist music and reading the bible  He denies alcohol and illicit drug use  Patient reports poor sleep, low interest, no guilt, low energy, normal concentration, decreased appetite, psychomotor slowing, but denies suicidal ideation  He reports he is very isolative and tearful  Denies any anxiety, panic attacks, PTSD history  Denies history of colt  Currently denying SI, HI, AVH, any history of suicide attempts  He reports he wants to come into the hospital, get back on his medications, have them adjusted, handle his depression, and work with therapist moving forward to help him through this tough time in his life  He reports he had healthy and happy marriage, he never fought with his wife, and everything change 10 days ago      He is agreeable to restarting his home Lexapro 10 mg and initiating Abilify 2 mg as an adjunct           Meds/Allergies     all current active meds have been reviewed    No Known Allergies    Objective     Vital signs in last 24 hours:  Temp:  [97 5 °F (36 4 °C)-98 1 °F (36 7 °C)] 98 1 °F (36 7 °C)  HR:  [87-98] 87  Resp:  [17-18] 18  BP: (131-142)/(81-87) 142/87      Intake/Output Summary (Last 24 hours) at 2/25/2022 1421  Last data filed at 2/25/2022 0845  Gross per 24 hour   Intake 360 ml   Output --   Net 360 ml       Hospital Course: The patient was admitted to the inpatient psychiatric unit and started on every 15 minutes precautions  A treatment plan was formed with focus on pharmacotherapy and milieu therapy, group therapy and individual psychotherapy when indicated  To address the patient's symptoms, the patient was prescribed antidepressant Lexapro titrated up to 20mg daily and Abilify was added and titrated up to 5mg daily for augmentation  Medication doses were titrated during the hospital course  The patient did not display self destructive or aggressive behaviors and did not require restraints  Throughout the hospitalization, the patient did not have falls  Patient's symptoms improved gradually over the hospital course  He came more visible on the unit, bright, interacting with select peers and attending groups  He reports positive thoughts, received news from his family that his wife is improving  At the end of treatment the patient was doing well  Mood was stable at the time of discharge  The patient denied suicidal ideation, intent or plan at the time of discharge and denied homicidal ideation, intent or plan at the time of discharge  There was no overt psychosis at the time of discharge  Sleep and appetite were improved  The patient was tolerating medications and was not reporting any significant side effects at the time of discharge  The patient has maximally benefitted from inpatient treatment and can be safely discharged to outpatient care  He is not suicidal or homicidal, he is hopeful and engaged with team and able to discuss coping skills such as listen to Rastafari  music, reading bible, talking to , mother, taking walks and also emphasized the importance of medication compliance   He remains at low chronic risk due to persistent stressors, medication non- compliance, long history of depression  The outpatient follow up with a psychiatrist was arranged by the unit  upon discharge  Mental Status at Time of Discharge:   Appearance:  Adequate hygiene and grooming   Behavior:  calm, cooperative and friendly   Speech:   Language: Normal rate and Normal volume  No overt abnormality   Mood:  Euthymic   Affect:   Associations: appropriate,reactive   Tightly connected   Thought Process:  Goal directed and coherent   Thought Content:  Does not verbalize delusional material   Perceptual Disturbances: Denies hallucinations and does not appear to be responding to internal stimuli     Risk Potential: No suicidal or homicidal ideation   Attention/Concentration attention span and concentration were age appropriate   Insight:  fair   Judgment: Fair improved,   Gait/Station: normal gait/station   Motor Activity: No abnormal movement noted       Admission Diagnosis:  Principal Problem:    Recurrent major depressive disorder ([facility] )  Active Problems:    Hypothyroidism    Atypical chest pain    Medical clearance for psychiatric admission      Discharge Diagnosis:     Principal Problem:    Recurrent major depressive disorder ([facility] )  Active Problems:    Hypothyroidism    Atypical chest pain    Medical clearance for psychiatric admission  Resolved Problems:    * No resolved hospital problems   *      Lab results:    Admission on 02/12/2022, Discharged on 02/25/2022   Component Date Value    Amph/Meth UR 02/12/2022 Negative     Barbiturate Ur 02/12/2022 Negative     Benzodiazepine Urine 02/12/2022 Negative     Cocaine Urine 02/12/2022 Negative     Methadone Urine 02/12/2022 Negative     Opiate Urine 02/12/2022 Negative     PCP Ur 02/12/2022 Negative     THC Urine 02/12/2022 Negative     Oxycodone Urine 02/12/2022 Negative     EXTBreath Alcohol 02/12/2022 0 000     WBC 02/12/2022 9 70     RBC 02/12/2022 4 90     Hemoglobin 02/12/2022 15 1     Hematocrit 02/12/2022 45 4     MCV 02/12/2022 93     MCH 02/12/2022 30 8     MCHC 02/12/2022 33 3     RDW 02/12/2022 13 6     MPV 02/12/2022 8 9     Platelets 42/41/9031 293     Neutrophils Relative 02/12/2022 77*    Lymphocytes Relative 02/12/2022 12*    Monocytes Relative 02/12/2022 10     Eosinophils Relative 02/12/2022 1     Basophils Relative 02/12/2022 1     Neutrophils Absolute 02/12/2022 7 50     Lymphocytes Absolute 02/12/2022 1 20     Monocytes Absolute 02/12/2022 1 00*    Eosinophils Absolute 02/12/2022 0 00     Basophils Absolute 02/12/2022 0 10     Sodium 02/12/2022 137     Potassium 02/12/2022 3 8     Chloride 02/12/2022 102     CO2 02/12/2022 26     ANION GAP 02/12/2022 9     BUN 02/12/2022 19     Creatinine 02/12/2022 0 96     Glucose 02/12/2022 99     Calcium 02/12/2022 8 8     AST 02/12/2022 26     ALT 02/12/2022 19     Alkaline Phosphatase 02/12/2022 106     Total Protein 02/12/2022 8 9*    Albumin 02/12/2022 4 8     Total Bilirubin 02/12/2022 0 92     eGFR 02/12/2022 90     TSH 3RD GENERATON 02/12/2022 2 950     Color, UA 02/12/2022 Sherly*    Clarity, UA 02/12/2022 Clear     Specific Gravity, UA 02/12/2022 1 025     pH, UA 02/12/2022 6 0     Leukocytes, UA 02/12/2022 Negative     Nitrite, UA 02/12/2022 Negative     Protein, UA 02/12/2022 15 (Trace)*    Glucose, UA 02/12/2022 Negative     Ketones, UA 02/12/2022 >=150 (3+)*    Bilirubin, UA 02/12/2022 Negative     Blood, UA 02/12/2022 25 0*    UROBILINOGEN UA 02/12/2022 Negative     SARS-CoV-2 02/12/2022 Negative     INFLUENZA A PCR 02/12/2022 Negative     INFLUENZA B PCR 02/12/2022 Negative     RSV PCR 02/12/2022 Negative     RBC, UA 02/12/2022 1-2     WBC, UA 02/12/2022 0-1     Epithelial Cells 02/12/2022 Occasional     Bacteria, UA 02/12/2022 Occasional     MUCUS THREADS 02/12/2022 Moderate*    Ventricular Rate 02/12/2022 97     Atrial Rate 02/12/2022 97     OH Interval 02/12/2022 140     QRSD Interval 02/12/2022 78     QT Interval 02/12/2022 368     QTC Interval 02/12/2022 467     P Axis 02/12/2022 64     QRS Axis 02/12/2022 48     T Wave Axis 02/12/2022 69     Hemoglobin A1C 02/25/2022 5 3     EAG 02/25/2022 105     RPR 02/25/2022 Non-Reactive     WBC 02/25/2022 8 00     RBC 02/25/2022 4 70     Hemoglobin 02/25/2022 14 5     Hematocrit 02/25/2022 43 9     MCV 02/25/2022 93     MCH 02/25/2022 30 8     MCHC 02/25/2022 33 0     RDW 02/25/2022 14 2     MPV 02/25/2022 9 5     Platelets 41/64/3044 273     Neutrophils Relative 02/25/2022 67*    Lymphocytes Relative 02/25/2022 19*    Monocytes Relative 02/25/2022 10     Eosinophils Relative 02/25/2022 2     Basophils Relative 02/25/2022 1     Neutrophils Absolute 02/25/2022 5 40     Lymphocytes Absolute 02/25/2022 1 50     Monocytes Absolute 02/25/2022 0 80     Eosinophils Absolute 02/25/2022 0 20     Basophils Absolute 02/25/2022 0 10     Sodium 02/25/2022 137     Potassium 02/25/2022 4 7     Chloride 02/25/2022 101     CO2 02/25/2022 31*    ANION GAP 02/25/2022 5     BUN 02/25/2022 17     Creatinine 02/25/2022 0 83     Glucose 02/25/2022 91     Calcium 02/25/2022 8 7     AST 02/25/2022 25     ALT 02/25/2022 22     Alkaline Phosphatase 02/25/2022 92     Total Protein 02/25/2022 7 5     Albumin 02/25/2022 4 1     Total Bilirubin 02/25/2022 0 19     eGFR 02/25/2022 101        Discharge Medications:   -Lexapro 20mg daily    -Abilify 5mg daily for depression augmentation  See after visit summary for reconciled discharge medications provided to patient and family  Discharge instructions/Information to patient and family:     See after visit summary for information provided to patient and family  Provisions for Follow-Up Care:    See after visit summary for information related to follow-up care and any pertinent home health orders  Discharge Statement     I spent 30 minutes discharging the patient   This time was spent on the day of discharge  I had direct contact with the patient on the day of discharge  Additional documentation is required if more than 30 minutes were spent on discharge:    I reviewed with Jaciel Montoya importance of compliance with medications and outpatient treatment after discharge  I discussed the medication regimen and possible side effects of the medications with Fabrizio prior to discharge  At the time of discharge he was tolerating psychiatric medications  I discussed outpatient follow up with Jaciel Montoya  I reviewed with Jaciel Montoya crisis plan and safety plan upon discharge

## 2022-02-25 NOTE — PLAN OF CARE
Problem: DISCHARGE PLANNING - CARE MANAGEMENT  Goal: Discharge to post-acute care or home with appropriate resources  Description: INTERVENTIONS:  - Conduct assessment to determine patient/family and health care team treatment goals, and need for post-acute services based on payer coverage, community resources, and patient preferences, and barriers to discharge  - Address psychosocial, clinical, and financial barriers to discharge as identified in assessment in conjunction with the patient/family and health care team  - Arrange appropriate level of post-acute services according to patients   needs and preference and payer coverage in collaboration with the physician and health care team  - Communicate with and update the patient/family, physician, and health care team regarding progress on the discharge plan  - Arrange appropriate transportation to post-acute venues  Outcome: Adequate for Discharge    DC home with support from [facility]  Outpatient MH treatment through [facility] counseling with Norwegian speaking counselor and psychiatrist  Trini Case transport via [facility] through [address] [place] [address]

## 2022-02-25 NOTE — BH TRANSITION RECORD
Contact Information: If you have any questions, concerns, pended studies, tests and/or procedures, or emergencies regarding your inpatient behavioral health visit  Please contact [facility] older adult behavioral health unit 6T [phone] and ask to speak to a , nurse or physician  A contact is available 24 hours/ 7 days a week at this number  Summary of Procedures Performed During your Stay:  Below is a list of major procedures performed during your hospital stay and a summary of results:  - No major procedures performed  Pending Studies (From admission, onward)     Start     Ordered    02/25/22 0600  Hemoglobin A1C  Morning draw         02/24/22 1454    02/25/22 0600  RPR  Morning draw         02/24/22 1454              If studies are pending at discharge, follow up with your PCP and/or referring provider

## 2022-02-25 NOTE — TREATMENT TEAM
02/25/22 8034   Team Meeting   Meeting Type Daily Rounds   Initial Conference Date 02/25/22   Team Members Present   Team Members Present Physician;Nurse;   Physician Team Member Dr Vitor Carter Team Member SUZY [facility] Management Team Member Caprice Hicks Work Team Member Jie RODRÍGUEZ Team Member Marshall DALY   Patient/Family Present   Patient Present No   Patient's Family Present No     Denies s/s, appears brighter, eating well, sleeping well, plan d/c today

## 2022-02-25 NOTE — NURSING NOTE
Pt anxious, restless at times but pleasant and med-compliant with good appetite and steady gait  VSS  Pt expressed understanding of d/c meds and f/u instructions  Pt left unit with all his belongings and escorted by staff to lobby to meet his mother for transport home at [phone]  Monitored for safety and support

## 2022-02-28 ENCOUNTER — TRANSITIONAL CARE MANAGEMENT (OUTPATIENT)
Dept: FAMILY MEDICINE CLINIC | Facility: CLINIC | Age: 52
End: 2022-02-28

## 2022-03-02 ENCOUNTER — OFFICE VISIT (OUTPATIENT)
Dept: FAMILY MEDICINE CLINIC | Facility: CLINIC | Age: 52
End: 2022-03-02
Payer: MEDICARE

## 2022-03-02 VITALS
RESPIRATION RATE: 18 BRPM | HEIGHT: 65 IN | TEMPERATURE: 98.2 F | HEART RATE: 120 BPM | SYSTOLIC BLOOD PRESSURE: 136 MMHG | OXYGEN SATURATION: 99 % | BODY MASS INDEX: 27.32 KG/M2 | WEIGHT: 164 LBS | DIASTOLIC BLOOD PRESSURE: 81 MMHG

## 2022-03-02 DIAGNOSIS — F32.A DEPRESSION: ICD-10-CM

## 2022-03-02 DIAGNOSIS — F41.1 GENERALIZED ANXIETY DISORDER: ICD-10-CM

## 2022-03-02 DIAGNOSIS — E03.8 OTHER SPECIFIED HYPOTHYROIDISM: ICD-10-CM

## 2022-03-02 DIAGNOSIS — F41.8 DEPRESSION WITH ANXIETY: Primary | ICD-10-CM

## 2022-03-02 PROCEDURE — 99496 TRANSJ CARE MGMT HIGH F2F 7D: CPT | Performed by: NURSE PRACTITIONER

## 2022-03-02 RX ORDER — POLYVINYL ALCOHOL 14 MG/ML
SOLUTION/ DROPS OPHTHALMIC
COMMUNITY
Start: 2022-02-25

## 2022-03-02 RX ORDER — ESCITALOPRAM OXALATE 20 MG/1
TABLET ORAL
Qty: 30 TABLET | Refills: 5 | Status: SHIPPED | OUTPATIENT
Start: 2022-03-02

## 2022-03-02 RX ORDER — ARIPIPRAZOLE 5 MG/1
5 TABLET ORAL DAILY
Qty: 30 TABLET | Refills: 5 | Status: SHIPPED | OUTPATIENT
Start: 2022-03-02

## 2022-03-02 RX ORDER — LEVOTHYROXINE SODIUM 0.07 MG/1
75 TABLET ORAL EVERY MORNING
Qty: 30 TABLET | Refills: 5 | Status: SHIPPED | OUTPATIENT
Start: 2022-03-02

## 2022-03-02 NOTE — ASSESSMENT & PLAN NOTE
-TSH is normal from 2/2022  To continue on Levothyroxine 75mcg daily as ordered  F/u every 6 months

## 2022-03-02 NOTE — ASSESSMENT & PLAN NOTE
-pt was recently hospitalized due to anxiety and depression  He reports feeling overwhelmed with family issues and household bills  His wife had a stroke and is in the ICU unit  He states that he is coping with prayer and believes God will help heal her  He is currently on Lexapro 20mg daily and Abilify 5mg daily and this combo is helping him with his symptoms  He still feels somewhat nervous  We discussed coping mechanisms and deep breathing exercises  He verbalized understanding and stated he will do these things and continue to pray as this helps him  He has not f/u with psych as of yet  He states he had a Psych doctor 1 year ago but was at peace and stopped going as he did not feel he needed it  He then fell into depression again due to his wife's condition and will need psych and therapy  He denies suicidal/homocidal ideations  To continue on his current medication regimen  Referral provided to psych and SW to help coordinate appointments

## 2022-03-02 NOTE — PROGRESS NOTES
Assessment/Plan:    Depression with anxiety  -pt was recently hospitalized due to anxiety and depression  He reports feeling overwhelmed with family issues and household bills  His wife had a stroke and is in the ICU unit  He states that he is coping with prayer and believes God will help heal her  He is currently on Lexapro 20mg daily and Abilify 5mg daily and this combo is helping him with his symptoms  He still feels somewhat nervous  We discussed coping mechanisms and deep breathing exercises  He verbalized understanding and stated he will do these things and continue to pray as this helps him  He has not f/u with psych as of yet  He states he had a Psych doctor 1 year ago but was at peace and stopped going as he did not feel he needed it  He then fell into depression again due to his wife's condition and will need psych and therapy  He denies suicidal/homocidal ideations  To continue on his current medication regimen  Referral provided to psych and  to help coordinate appointments  Hypothyroidism  -TSH is normal from 2/2022  To continue on Levothyroxine 75mcg daily as ordered  F/u every 6 months  Diagnoses and all orders for this visit:    Depression with anxiety  -     Ambulatory Referral to Psychiatry; Future  -     Ambulatory Referral to Social Work Care Management Program; Future    Other specified hypothyroidism  -     levothyroxine 75 mcg tablet; Take 1 tablet (75 mcg total) by mouth every morning    Generalized anxiety disorder  -     escitalopram (LEXAPRO) 20 mg tablet; 1 tab daily in morning    Depression  -     ARIPiprazole (ABILIFY) 5 mg tablet; Take 1 tablet (5 mg total) by mouth daily    Other orders  -     Artificial Tears 1 4 % ophthalmic solution          Subjective:      Patient ID: Rosa Akins is a 45 y o  male  45year old male patient here for follow up TCM due to depression because he had to make a lot of decisions and felt overwhelmed at the time   Is currently on Lexapro 10mg and Abilify 2mg and they are helping him per patient  Has not followed up with Psych  States he had a Psych doctor before but he has not returned this has been over a year  States he felt well and didn't feel like he needed it  He felt like he was overwhelmed with family issues and his wife had a stroke  He is sad due to this but confides in God and she will get better per patient  Denies any suicidal/homocidal ideations  The following portions of the patient's history were reviewed and updated as appropriate: allergies, current medications, past family history, past medical history, past social history, past surgical history and problem list     Review of Systems   Constitutional: Negative  Negative for appetite change, fatigue and fever  HENT: Negative  Negative for congestion, ear pain, postnasal drip, rhinorrhea, sore throat and voice change  Eyes: Negative  Respiratory: Negative  Negative for cough, chest tightness, shortness of breath and wheezing  Cardiovascular: Negative  Negative for chest pain and palpitations  Gastrointestinal: Negative  Negative for abdominal distention  Endocrine: Negative  Genitourinary: Negative  Negative for difficulty urinating  Musculoskeletal: Negative  Negative for arthralgias  Skin: Negative  Negative for color change and rash  Allergic/Immunologic: Negative  Neurological: Negative  Negative for dizziness and headaches  Hematological: Negative  Does not bruise/bleed easily  Psychiatric/Behavioral: Negative for self-injury, sleep disturbance and suicidal ideas  The patient is nervous/anxious  Objective:      /81 (BP Location: Right arm, Patient Position: Sitting, Cuff Size: Standard)   Pulse (!) 120   Temp 98 2 °F (36 8 °C) (Temporal)   Resp 18   Ht 5' 5" (1 651 m)   Wt 74 4 kg (164 lb)   SpO2 99%   BMI 27 29 kg/m²          Physical Exam  Vitals and nursing note reviewed     Constitutional:       General: He is not in acute distress  Appearance: Normal appearance  He is not ill-appearing  HENT:      Head: Normocephalic and atraumatic  Right Ear: External ear normal       Left Ear: External ear normal       Nose: Nose normal  No congestion or rhinorrhea  Mouth/Throat:      Pharynx: Oropharynx is clear  No oropharyngeal exudate  Eyes:      Conjunctiva/sclera: Conjunctivae normal    Cardiovascular:      Rate and Rhythm: Regular rhythm  Tachycardia present  Pulses: Normal pulses  Heart sounds: Normal heart sounds  Pulmonary:      Effort: Pulmonary effort is normal  No respiratory distress  Breath sounds: Normal breath sounds  Musculoskeletal:         General: Normal range of motion  Cervical back: Normal range of motion and neck supple  Skin:     General: Skin is warm and dry  Capillary Refill: Capillary refill takes less than 2 seconds  Neurological:      General: No focal deficit present  Mental Status: He is alert and oriented to person, place, and time  Psychiatric:         Attention and Perception: Attention normal          Mood and Affect: Mood is anxious  Speech: Speech normal          Behavior: Behavior normal          Thought Content:  Thought content normal          Cognition and Memory: Cognition normal          TCM Call (since 1/30/2022)     Patient was hospitialized at  [address] [place] [address]        Date of Admission  02/12/22    Date of discharge  02/25/22    Diagnosis  Depression    Disposition  Home    Current Symptoms  None      TCM Call (since 1/30/2022)     None          TCM Call (since 1/30/2022)     Patient was hospitialized at  [address] [place] [address]        Date of Admission  02/12/22    Date of discharge  02/25/22    Diagnosis  Depression    Disposition  Home    Current Symptoms  None      TCM Call (since 1/30/2022)     None

## 2022-03-03 VITALS
DIASTOLIC BLOOD PRESSURE: 87 MMHG | HEIGHT: 68 IN | WEIGHT: 163.14 LBS | BODY MASS INDEX: 24.73 KG/M2 | RESPIRATION RATE: 18 BRPM | SYSTOLIC BLOOD PRESSURE: 142 MMHG | OXYGEN SATURATION: 99 % | HEART RATE: 87 BPM | TEMPERATURE: 98.1 F

## 2022-03-11 ENCOUNTER — PATIENT OUTREACH (OUTPATIENT)
Dept: FAMILY MEDICINE CLINIC | Facility: CLINIC | Age: 52
End: 2022-03-11

## 2022-03-11 NOTE — PROGRESS NOTES
JASIEL ANDERSON received a referral from patient's PCP regarding patient's mental health and need for assistance establishing services  JASIEL ANDERSON contacted patient at this time-communicated with patient in his preferred language, Vincentian  Patient states he was going to [facility] for services in the past but has recently been establishied with [facility]  Patient reports he has already had his intake appointment and met his therapist  Patient was referred to [facility] after his most recent ED visit  Patient reports being scheduled for a second appointment and being satisfied with the services thus far  Patient also states he continues to go to [employer] which has also helped improve symptoms of anxiety and depression  He states his relationship with his wife has also been improving as well  Patient denies having any additional questions or concerns at this time  Referral will be closed  JASIEL ANDERSON will be available if needed, via new order

## 2022-05-17 ENCOUNTER — OFFICE VISIT (OUTPATIENT)
Dept: FAMILY MEDICINE CLINIC | Facility: CLINIC | Age: 52
End: 2022-05-17
Payer: MEDICARE

## 2022-05-17 VITALS
OXYGEN SATURATION: 99 % | WEIGHT: 169 LBS | RESPIRATION RATE: 18 BRPM | HEART RATE: 107 BPM | SYSTOLIC BLOOD PRESSURE: 128 MMHG | BODY MASS INDEX: 28.16 KG/M2 | TEMPERATURE: 98.2 F | DIASTOLIC BLOOD PRESSURE: 80 MMHG | HEIGHT: 65 IN

## 2022-05-17 DIAGNOSIS — Z11.59 ENCOUNTER FOR HEPATITIS C SCREENING TEST FOR LOW RISK PATIENT: ICD-10-CM

## 2022-05-17 DIAGNOSIS — E03.9 ACQUIRED HYPOTHYROIDISM: ICD-10-CM

## 2022-05-17 DIAGNOSIS — F41.8 DEPRESSION WITH ANXIETY: ICD-10-CM

## 2022-05-17 DIAGNOSIS — Z12.5 SCREENING FOR PROSTATE CANCER: ICD-10-CM

## 2022-05-17 DIAGNOSIS — R05.9 COUGH: ICD-10-CM

## 2022-05-17 DIAGNOSIS — Z00.00 MEDICARE ANNUAL WELLNESS VISIT, SUBSEQUENT: Primary | ICD-10-CM

## 2022-05-17 PROCEDURE — G0439 PPPS, SUBSEQ VISIT: HCPCS | Performed by: NURSE PRACTITIONER

## 2022-05-17 RX ORDER — BENZONATATE 200 MG/1
200 CAPSULE ORAL 3 TIMES DAILY PRN
Qty: 20 CAPSULE | Refills: 0 | Status: SHIPPED | OUTPATIENT
Start: 2022-05-17

## 2022-05-17 NOTE — ASSESSMENT & PLAN NOTE
-pt sees therapist every 2 weeks  Last time he saw psych was in 2/22 had an appt on 04/30 but not able to make it and will f/u with new appt per patient  Feels well on Lexapro 20mg and Abilify 5mg daily  Will continue with care of mental health provider no changes today

## 2022-05-17 NOTE — PATIENT INSTRUCTIONS

## 2022-05-17 NOTE — ASSESSMENT & PLAN NOTE
Screening for cardiovascular risk factors:  pt due for screening blood work at this time; will order PSA and Hept C antibody   - Colon cancer screening: had colonoscopy 7/26/21 due in 5 years   -Ordering PSA  Up to date on vaccinations    - Pt is compliant with current medication regimen

## 2022-05-17 NOTE — PROGRESS NOTES
Assessment and Plan:     Problem List Items Addressed This Visit        Endocrine    Hypothyroidism     -TSH normal from 2/2022  Continue on Levothyroxine 75mcg  F/u in 08/2022 for 6 month f/u  Other    Medicare annual wellness visit, subsequent - Primary     Screening for cardiovascular risk factors:  pt due for screening blood work at this time; will order PSA and Hept C antibody   - Colon cancer screening: had colonoscopy 7/26/21 due in 5 years   -Ordering PSA  Up to date on vaccinations  - Pt is compliant with current medication regimen             Screening for prostate cancer    Relevant Orders    PSA, total and free    Encounter for hepatitis C screening test for low risk patient    Relevant Orders    Hepatitis C antibody    Depression with anxiety     -pt sees therapist every 2 weeks  Last time he saw psych was in 2/22 had an appt on 04/30 but not able to make it and will f/u with new appt per patient  Feels well on Lexapro 20mg and Abilify 5mg daily  Will continue with care of mental health provider no changes today  Cough     Pt reports having URI x 1 week now  States he is not taking anything for it  On exam lung sounds clear without wheezing or crackles noted  No nasal congestion noted or ear pain  Denies any sick contacts  Patient to practice conservative measures  Advised on strict handwashing and covering cough  I am prescribing cough medication as well  Rest and fluids  Home remedies like tea with honey/anamika, warm soups etc  If s/s worsen or fail to improve after 1 week advised to return or follow up with office  Rx given to use benzonatate           Relevant Medications    benzonatate (TESSALON) 200 MG capsule        BMI Counseling: Body mass index is 28 12 kg/m²   The BMI is above normal  Nutrition recommendations include encouraging healthy choices of fruits and vegetables, decreasing fast food intake, consuming healthier snacks, limiting drinks that contain sugar, increasing intake of lean protein, reducing intake of saturated and trans fat and reducing intake of cholesterol  Exercise recommendations include exercising 3-5 times per week  Rationale for BMI follow-up plan is due to patient being overweight or obese  Depression Screening and Follow-up Plan: Continue regular follow-up with their mental health provider who is managing their mental health condition(s)  Preventive health issues were discussed with patient, and age appropriate screening tests were ordered as noted in patient's After Visit Summary  Personalized health advice and appropriate referrals for health education or preventive services given if needed, as noted in patient's After Visit Summary       History of Present Illness:     Patient presents for Medicare Annual Wellness visit    Patient Care Team:  Hemal feliz PCP - General (Nurse Practitioner)     Problem List:     Patient Active Problem List   Diagnosis    History of Hodgkin's lymphoma    Kidney lesion    Hypothyroidism    Colon cancer screening    Screening for cholesterol level    Hyperglycemia    Other fatigue    Screening for colon cancer    Medicare annual wellness visit, subsequent    Screening for prostate cancer    Generalized anxiety disorder    Major depressive disorder with single episode, [facility] partial remission [facility]    Pain of left heel    Toenail fungus    Needs flu shot    Family history of colon cancer    [facility]    Encounter for hepatitis C screening test for low risk patient    Atypical chest pain    Medical clearance for psychiatric admission    Recurrent major depressive disorder ([facility] )    Depression with anxiety    Depression    Cough      Past Medical and Surgical History:     Past Medical History:   Diagnosis Date    Cancer ([facility] )     Hodgkins lymphoma    Chest pain radiating to arm     Colon polyp     Depression     Disease of thyroid gland     [facility] with psychosis [facility] 7/12/2018    Psychiatric disorder      Past Surgical History:   Procedure Laterality Date    COLONOSCOPY      COLONOSCOPY W/ POLYPECTOMY  07/2021    HERNIA REPAIR        Family History:     Family History   Problem Relation Age of Onset    Colon cancer Father       Social History:     Social History     Socioeconomic History    Marital status: /Civil Union     Spouse name: None    Number of children: None    Years of education: None    Highest education level: None   Occupational History    None   Tobacco Use    Smoking status: Never Smoker    Smokeless tobacco: Never Used   Vaping Use    Vaping Use: Never used   Substance and Sexual Activity    Alcohol use: No    Drug use: No    Sexual activity: Not Currently   Other Topics Concern    None   Social History Narrative    None     Social Determinants of Health     Financial Resource Strain: Not on file   Food Insecurity: Not on file   Transportation Needs: Not on file   Physical Activity: Not on file   Stress: Not on file   Social Connections: Not on file   Intimate Partner Violence: Not on file   Housing Stability: Not on file      Medications and Allergies:     Current Outpatient Medications   Medication Sig Dispense Refill    ARIPiprazole (ABILIFY) 5 mg tablet Take 1 tablet (5 mg total) by mouth daily 30 tablet 5    Artificial Tears 1 4 % ophthalmic solution       benzonatate (TESSALON) 200 MG capsule Take 1 capsule (200 mg total) by mouth as needed in the morning and 1 capsule (200 mg total) as needed at noon and 1 capsule (200 mg total) as needed in the evening for cough  20 capsule 0    escitalopram (LEXAPRO) 20 mg tablet 1 tab daily in morning 30 tablet 5    levothyroxine 75 mcg tablet Take 1 tablet (75 mcg total) by mouth every morning 30 tablet 5     No current facility-administered medications for this visit       No Known Allergies   Immunizations:     Immunization History   Administered Date(s) Administered    COVID-19 MODERNA VACC 0 5 ML IM 05/11/2021, 06/08/2021    INFLUENZA 01/06/2016, 01/06/2016, 10/03/2016, 10/03/2016, 09/27/2018, 09/27/2018, 10/29/2019, 10/29/2019    Influenza, recombinant, quadrivalent,injectable, preservative free 09/29/2020      Health Maintenance:         Topic Date Due    Hepatitis C Screening  Never done    Colorectal Cancer Screening  07/25/2026    HIV Screening  Completed         Topic Date Due    DTaP,Tdap,and Td Vaccines (1 - Tdap) Never done    COVID-19 Vaccine (3 - Booster for Moderna series) 11/08/2021      Medicare Health Risk Assessment:     /80 (BP Location: Right arm, Patient Position: Sitting, Cuff Size: Standard)   Pulse (!) 107   Temp 98 2 °F (36 8 °C) (Tympanic)   Resp 18   Ht 5' 5" (1 651 m)   Wt 76 7 kg (169 lb)   SpO2 99%   BMI 28 12 kg/m²          Health Risk Assessment:   Patient rates overall health as good  Patient feels that their physical health rating is same  Patient is satisfied with their life  Eyesight was rated as same  Hearing was rated as same  Patient feels that their emotional and mental health rating is same  Patients states they are never, rarely angry  Patient states they are sometimes unusually tired/fatigued  Pain experienced in the last 7 days has been none  Patient states that he has experienced no weight loss or gain in last 6 months  Fall Risk Screening: In the past year, patient has experienced: no history of falling in past year      Home Safety:  Patient does not have trouble with stairs inside or outside of their home  Patient has working smoke alarms and has working carbon monoxide detector  Home safety hazards include: none  Nutrition:   Current diet is Regular  Medications:   Patient is not currently taking any over-the-counter supplements  Patient is able to manage medications       Activities of Daily Living (ADLs)/Instrumental Activities of Daily Living (IADLs):   Walk and transfer into and out of bed and chair?: Yes  Dress and groom yourself?: Yes    Bathe or shower yourself?: Yes    Feed yourself? Yes  Do your laundry/housekeeping?: Yes  Manage your money, pay your bills and track your expenses?: Yes  Make your own meals?: Yes    Do your own shopping?: Yes    Previous Hospitalizations:   Any hospitalizations or ED visits within the last 12 months?: No      Advance Care Planning:   Living will: No      PREVENTIVE SCREENINGS      Cardiovascular Screening:    General: Screening Current      Diabetes Screening:     General: Screening Current      Colorectal Cancer Screening:     General: Screening Current      Prostate Cancer Screening:    General: Risks and Benefits Discussed    Due for: PSA      Osteoporosis Screening:    General: Screening Not Indicated      Abdominal Aortic Aneurysm (AAA) Screening:        General: Screening Not Indicated      Lung Cancer Screening:     General: Screening Not Indicated      Hepatitis C Screening:    General: Risks and Benefits Discussed    Hep C Screening Accepted: Yes      Other Counseling Topics:   Car/seat belt/driving safety, skin self-exam, sunscreen and calcium and vitamin D intake and regular weightbearing exercise         LETHA Hernandez

## 2022-05-17 NOTE — ASSESSMENT & PLAN NOTE
Pt reports having URI x 1 week now  States he is not taking anything for it  On exam lung sounds clear without wheezing or crackles noted  No nasal congestion noted or ear pain  Denies any sick contacts  Patient to practice conservative measures  Advised on strict handwashing and covering cough  I am prescribing cough medication as well  Rest and fluids  Home remedies like tea with honey/anamika, warm soups etc  If s/s worsen or fail to improve after 1 week advised to return or follow up with office      Rx given to use benzonatate

## 2022-08-11 ENCOUNTER — HOSPITAL ENCOUNTER (EMERGENCY)
Facility: HOSPITAL | Age: 52
Discharge: HOME | End: 2022-08-11
Attending: EMERGENCY MEDICINE
Payer: MEDICARE

## 2022-08-11 ENCOUNTER — APPOINTMENT (EMERGENCY)
Dept: RADIOLOGY | Facility: HOSPITAL | Age: 52
End: 2022-08-11
Payer: MEDICARE

## 2022-08-11 VITALS
RESPIRATION RATE: 18 BRPM | DIASTOLIC BLOOD PRESSURE: 88 MMHG | SYSTOLIC BLOOD PRESSURE: 144 MMHG | HEART RATE: 70 BPM | OXYGEN SATURATION: 99 % | TEMPERATURE: 98.5 F

## 2022-08-11 DIAGNOSIS — R07.89 ATYPICAL CHEST PAIN: Primary | ICD-10-CM

## 2022-08-11 LAB
2HR DELTA HS TROPONIN: 1 NG/L
ANION GAP SERPL CALCULATED.3IONS-SCNC: 8 MMOL/L (ref 4–13)
BASOPHILS # BLD AUTO: 0.06 THOUSANDS/ΜL (ref 0–0.1)
BASOPHILS NFR BLD AUTO: 1 % (ref 0–1)
BUN SERPL-MCNC: 23 MG/DL (ref 5–25)
CALCIUM SERPL-MCNC: 8.7 MG/DL (ref 8.3–10.1)
CARDIAC TROPONIN I PNL SERPL HS: 4 NG/L
CARDIAC TROPONIN I PNL SERPL HS: 5 NG/L
CHLORIDE SERPL-SCNC: 104 MMOL/L (ref 96–108)
CO2 SERPL-SCNC: 26 MMOL/L (ref 21–32)
CREAT SERPL-MCNC: 1.14 MG/DL (ref 0.6–1.3)
EOSINOPHIL # BLD AUTO: 0.19 THOUSAND/ΜL (ref 0–0.61)
EOSINOPHIL NFR BLD AUTO: 2 % (ref 0–6)
ERYTHROCYTE [DISTWIDTH] IN BLOOD BY AUTOMATED COUNT: 13.3 % (ref 11.6–15.1)
GFR SERPL CREATININE-BSD FRML MDRD: 73 ML/MIN/1.73SQ M
GLUCOSE SERPL-MCNC: 98 MG/DL (ref 65–140)
HCT VFR BLD AUTO: 41.7 % (ref 36.5–49.3)
HGB BLD-MCNC: 13.9 G/DL (ref 12–17)
IMM GRANULOCYTES # BLD AUTO: 0.06 THOUSAND/UL (ref 0–0.2)
IMM GRANULOCYTES NFR BLD AUTO: 1 % (ref 0–2)
LYMPHOCYTES # BLD AUTO: 1.95 THOUSANDS/ΜL (ref 0.6–4.47)
LYMPHOCYTES NFR BLD AUTO: 20 % (ref 14–44)
MCH RBC QN AUTO: 31 PG (ref 26.8–34.3)
MCHC RBC AUTO-ENTMCNC: 33.3 G/DL (ref 31.4–37.4)
MCV RBC AUTO: 93 FL (ref 82–98)
MONOCYTES # BLD AUTO: 0.88 THOUSAND/ΜL (ref 0.17–1.22)
MONOCYTES NFR BLD AUTO: 9 % (ref 4–12)
NEUTROPHILS # BLD AUTO: 6.4 THOUSANDS/ΜL (ref 1.85–7.62)
NEUTS SEG NFR BLD AUTO: 67 % (ref 43–75)
NRBC BLD AUTO-RTO: 0 /100 WBCS
PLATELET # BLD AUTO: 279 THOUSANDS/UL (ref 149–390)
PMV BLD AUTO: 10.6 FL (ref 8.9–12.7)
POTASSIUM SERPL-SCNC: 4.1 MMOL/L (ref 3.5–5.3)
RBC # BLD AUTO: 4.48 MILLION/UL (ref 3.88–5.62)
SODIUM SERPL-SCNC: 138 MMOL/L (ref 135–147)
WBC # BLD AUTO: 9.54 THOUSAND/UL (ref 4.31–10.16)

## 2022-08-11 PROCEDURE — 80048 BASIC METABOLIC PNL TOTAL CA: CPT | Performed by: EMERGENCY MEDICINE

## 2022-08-11 PROCEDURE — 71045 X-RAY EXAM CHEST 1 VIEW: CPT

## 2022-08-11 PROCEDURE — 99285 EMERGENCY DEPT VISIT HI MDM: CPT

## 2022-08-11 PROCEDURE — 36415 COLL VENOUS BLD VENIPUNCTURE: CPT | Performed by: EMERGENCY MEDICINE

## 2022-08-11 PROCEDURE — 99285 EMERGENCY DEPT VISIT HI MDM: CPT | Performed by: EMERGENCY MEDICINE

## 2022-08-11 PROCEDURE — 85025 COMPLETE CBC W/AUTO DIFF WBC: CPT | Performed by: EMERGENCY MEDICINE

## 2022-08-11 PROCEDURE — 93005 ELECTROCARDIOGRAM TRACING: CPT

## 2022-08-11 PROCEDURE — 84484 ASSAY OF TROPONIN QUANT: CPT | Performed by: EMERGENCY MEDICINE

## 2022-08-11 NOTE — ED PROVIDER NOTES
History  Chief Complaint   Patient presents with    Chest Pain     Pt reports chest pain x2 days  Left sided radiating to left arm  Pt denies SOB  No injuries  47y M here for evaluation of episodic chest pain w/ radiation into the left arm  Pain is in the left side of the chest, sharp in nature, moderate, radiates as above  Pain lasts for a couple of minutes and is totally pain free between episodes  Can happen at rest or with activity and nothing makes it better/worse  Has had similar pain in the past with no cause found  Had stress echo 2020 that was negative  Denies recent falls/injuries  Denies f/c/s, no cough/congestion, no sob/tovar, no n/v, no urinary symptoms, no le pain or swelling  History provided by:  Patient and relative   used: No    Chest Pain  Pain location:  L chest  Pain quality: sharp    Pain radiates to:  L arm  Pain radiates to the back: no    Pain severity:  Moderate  Onset quality:  Sudden  Timing:  Intermittent  Progression:  Waxing and waning  Chronicity:  Recurrent  Context: at rest    Context: no stress    Relieved by:  Nothing  Worsened by:  Nothing tried  Ineffective treatments:  None tried  Associated symptoms: no abdominal pain, no anorexia, no cough, no diaphoresis, no dizziness, no fatigue, no fever, no heartburn, no lower extremity edema, no nausea, no numbness, no palpitations, no shortness of breath and not vomiting    Risk factors: male sex    Risk factors: no coronary artery disease and no smoking        Prior to Admission Medications   Prescriptions Last Dose Informant Patient Reported? Taking?    ARIPiprazole (ABILIFY) 5 mg tablet Not Taking at Unknown time  No No   Sig: Take 1 tablet (5 mg total) by mouth daily   Patient not taking: Reported on 8/11/2022   Artificial Tears 1 4 % ophthalmic solution Not Taking at Unknown time  Yes No   Patient not taking: Reported on 8/11/2022   benzonatate (TESSALON) 200 MG capsule Not Taking at Unknown time No No   Sig: Take 1 capsule (200 mg total) by mouth as needed in the morning and 1 capsule (200 mg total) as needed at noon and 1 capsule (200 mg total) as needed in the evening for cough  Patient not taking: Reported on 2022   escitalopram (LEXAPRO) 20 mg tablet Not Taking at Unknown time  No No   Si tab daily in morning   Patient not taking: Reported on 2022   levothyroxine 75 mcg tablet 2022 at Unknown time  No Yes   Sig: Take 1 tablet (75 mcg total) by mouth every morning      Facility-Administered Medications: None       Past Medical History:   Diagnosis Date    Cancer ([facility] )     Hodgkins lymphoma    Chest pain radiating to arm     Colon polyp     Depression     Disease of thyroid gland     Mood disorder with psychosis ([facility] ) 2018    Psychiatric disorder        Past Surgical History:   Procedure Laterality Date    COLONOSCOPY      COLONOSCOPY W/ POLYPECTOMY  2021    HERNIA REPAIR         Family History   Problem Relation Age of Onset    Colon cancer Father      I have reviewed and agree with the history as documented  E-Cigarette/Vaping    E-Cigarette Use Never User      E-Cigarette/Vaping Substances     Social History     Tobacco Use    Smoking status: Never Smoker    Smokeless tobacco: Never Used   Vaping Use    Vaping Use: Never used   Substance Use Topics    Alcohol use: No    Drug use: No       Review of Systems   Constitutional: Negative for diaphoresis, fatigue and fever  Respiratory: Negative for cough and shortness of breath  Cardiovascular: Positive for chest pain  Negative for palpitations  Gastrointestinal: Negative for abdominal pain, anorexia, heartburn, nausea and vomiting  Neurological: Negative for dizziness and numbness  All other systems reviewed and are negative  Physical Exam  Physical Exam  Vitals and nursing note reviewed  Constitutional:       Appearance: Normal appearance     HENT:      Mouth/Throat:      Mouth: Mucous membranes are moist    Eyes:      Conjunctiva/sclera: Conjunctivae normal    Cardiovascular:      Rate and Rhythm: Normal rate and regular rhythm  Pulmonary:      Effort: Pulmonary effort is normal       Breath sounds: Normal breath sounds  Abdominal:      Palpations: Abdomen is soft  Musculoskeletal:         General: No swelling or tenderness  Cervical back: Normal range of motion  Skin:     General: Skin is warm  Neurological:      General: No focal deficit present  Mental Status: He is alert and oriented to person, place, and time  Psychiatric:         Mood and Affect: Mood is anxious           Vital Signs  ED Triage Vitals   Temperature Pulse Respirations Blood Pressure SpO2   08/11/22 1743 08/11/22 1739 08/11/22 1739 08/11/22 1739 08/11/22 1739   98 5 °F (36 9 °C) 98 16 148/90 99 %      Temp Source Heart Rate Source Patient Position - Orthostatic VS BP Location FiO2 (%)   08/11/22 1743 08/11/22 1739 08/11/22 1739 08/11/22 1739 --   Oral Monitor Lying Right arm       Pain Score       08/11/22 1739       5           Vitals:    08/11/22 1857 08/11/22 2015 08/11/22 2115 08/11/22 2215   BP:  140/88 139/84 144/88   Pulse: 83 78 72 70   Patient Position - Orthostatic VS: Lying Lying Lying Lying         Visual Acuity      ED Medications  Medications - No data to display    Diagnostic Studies  Results Reviewed     Procedure Component Value Units Date/Time    HS Troponin I 2hr [[id number]]  (Normal) Collected: 08/11/22 2137    Lab Status: Final result Specimen: Blood from Arm, Left Updated: 08/11/22 2206     hs TnI 2hr 5 ng/L      Delta 2hr hsTnI 1 ng/L     HS Troponin 0hr (reflex protocol) [[id number]]  (Normal) Collected: 08/11/22 1853    Lab Status: Final result Specimen: Blood from Arm, Left Updated: 08/11/22 1923     hs TnI 0hr 4 ng/L     Basic metabolic panel [[id number]] Collected: 08/11/22 1853    Lab Status: Final result Specimen: Blood from Arm, Left Updated: 08/11/22 1921     Sodium 138 mmol/L      Potassium 4 1 mmol/L      Chloride 104 mmol/L      CO2 26 mmol/L      ANION GAP 8 mmol/L      BUN 23 mg/dL      Creatinine 1 14 mg/dL      Glucose 98 mg/dL      Calcium 8 7 mg/dL      eGFR 73 ml/min/1 73sq m     Narrative:      [place] guidelines for Chronic Kidney Disease (CKD):     Stage 1 with normal or high GFR (GFR > 90 mL/min/1 73 square meters)    Stage 2 Mild CKD (GFR = 60-89 mL/min/1 73 square meters)    Stage 3A Moderate CKD (GFR = 45-59 mL/min/1 73 square meters)    Stage 3B Moderate CKD (GFR = 30-44 mL/min/1 73 square meters)    Stage 4 Severe CKD (GFR = 15-29 mL/min/1 73 square meters)    Stage 5 End Stage CKD (GFR <15 mL/min/1 73 square meters)  Note: GFR calculation is accurate only with a steady state creatinine    CBC and differential [[id number]] Collected: 08/11/22 1853    Lab Status: Final result Specimen: Blood from Arm, Left Updated: 08/11/22 1902     WBC 9 54 Thousand/uL      RBC 4 48 Million/uL      Hemoglobin 13 9 g/dL      Hematocrit 41 7 %      MCV 93 fL      MCH 31 0 pg      MCHC 33 3 g/dL      RDW 13 3 %      MPV 10 6 fL      Platelets 582 Thousands/uL      nRBC 0 /100 WBCs      Neutrophils Relative 67 %      Immat GRANS % 1 %      Lymphocytes Relative 20 %      Monocytes Relative 9 %      Eosinophils Relative 2 %      Basophils Relative 1 %      Neutrophils Absolute 6 40 Thousands/µL      Immature Grans Absolute 0 06 Thousand/uL      Lymphocytes Absolute 1 95 Thousands/µL      Monocytes Absolute 0 88 Thousand/µL      Eosinophils Absolute 0 19 Thousand/µL      Basophils Absolute 0 06 Thousands/µL                  XR chest 1 view portable   Final Result by Donald Antonio MD (08/11 2015)      No acute cardiopulmonary disease                    Workstation performed: [id number]                    Procedures  ECG 12 Lead Documentation Only    Date/Time: 8/11/2022 6:46 PM  Performed by: Fany Grande DO  Authorized by: Fany Grande DO Indications / Diagnosis:  Pain  ECG reviewed by me, the ED Provider: yes    Patient location:  ED  Previous ECG:     Previous ECG:  Compared to current    Similarity:  No change  Interpretation:     Interpretation: normal    Rate:     ECG rate:  80    ECG rate assessment: normal    Rhythm:     Rhythm: sinus rhythm    Ectopy:     Ectopy: none    ST segments:     ST segments:  Normal  T waves:     T waves: normal               ED Course  ED Course as of 08/12/22 0128   Thu Aug 11, 2022   1940 hs TnI 0hr: 4  Will need delta   2212 Delta 2hr hsTnI: 1                                             MDM  Number of Diagnoses or Management Options  Atypical chest pain: new and requires workup     Amount and/or Complexity of Data Reviewed  Clinical lab tests: ordered and reviewed  Tests in the radiology section of CPT®: ordered and reviewed  Tests in the medicine section of CPT®: ordered and reviewed  Obtain history from someone other than the patient: yes  Independent visualization of images, tracings, or specimens: yes        Disposition  Final diagnoses:   Atypical chest pain     Time reflects when diagnosis was documented in both MDM as applicable and the Disposition within this note     Time User Action Codes Description Comment    8/11/2022 10:12 PM Mackenzie Garcia Add [R07 89] Atypical chest pain       ED Disposition     ED Disposition   Discharge    Condition   Stable    Date/Time   Thu Aug 11, 2022 10:12 PM    Comment   Ji Nick discharge to home/self care                 Follow-up Information     Follow up With Specialties Details Why Contact Info    LETHA Dong Nurse Practitioner Schedule an appointment as soon as possible for a visit  As needed [address]  [address]  [phone]            Discharge Medication List as of 8/11/2022 10:14 PM      CONTINUE these medications which have NOT CHANGED    Details   levothyroxine 75 mcg tablet Take 1 tablet (75 mcg total) by mouth every morning, Starting Wed 3/2/2022, Normal      ARIPiprazole (ABILIFY) 5 mg tablet Take 1 tablet (5 mg total) by mouth daily, Starting Wed 3/2/2022, Normal      Artificial Tears 1 4 % ophthalmic solution Starting Fri 2/25/2022, Historical Med      benzonatate (TESSALON) 200 MG capsule Take 1 capsule (200 mg total) by mouth as needed in the morning and 1 capsule (200 mg total) as needed at noon and 1 capsule (200 mg total) as needed in the evening for cough  , Starting Tue 5/17/2022, Normal      escitalopram (LEXAPRO) 20 mg tablet 1 tab daily in morning, Normal             No discharge procedures on file      PDMP Review       Value Time User    PDMP Reviewed  Yes 2/25/2022  9:45 AM Soha Kaye MD          ED Provider  Electronically Signed by           Zack Valles DO  08/12/22 0128

## 2022-08-12 LAB
ATRIAL RATE: 72 BPM
ATRIAL RATE: 80 BPM
ATRIAL RATE: 97 BPM
P AXIS: 61 DEGREES
P AXIS: 68 DEGREES
P AXIS: 70 DEGREES
PR INTERVAL: 146 MS
PR INTERVAL: 152 MS
PR INTERVAL: 154 MS
QRS AXIS: 46 DEGREES
QRS AXIS: 46 DEGREES
QRS AXIS: 49 DEGREES
QRSD INTERVAL: 80 MS
QRSD INTERVAL: 80 MS
QRSD INTERVAL: 86 MS
QT INTERVAL: 362 MS
QT INTERVAL: 376 MS
QT INTERVAL: 412 MS
QTC INTERVAL: 433 MS
QTC INTERVAL: 451 MS
QTC INTERVAL: 459 MS
T WAVE AXIS: 63 DEGREES
T WAVE AXIS: 63 DEGREES
T WAVE AXIS: 72 DEGREES
VENTRICULAR RATE: 72 BPM
VENTRICULAR RATE: 80 BPM
VENTRICULAR RATE: 97 BPM

## 2022-08-12 PROCEDURE — 93010 ELECTROCARDIOGRAM REPORT: CPT

## 2022-08-12 NOTE — ASSESSMENT & PLAN NOTE
· Hx of Hodgkin's lymphoma > 25 years ago treated with chemotherapy and radiation to the neck and central chest in 1993   Per outpatient oncology visit in Feb 2021: Patient is cured at this point time

## 2022-08-12 NOTE — ASSESSMENT & PLAN NOTE
· Hx of atypical CP thought to be musculoskeletal  · Dec 2021: Seen by Cardiology, Dr Nikita Fields, "Low suspicion that his atypical chest pain is from obstructive coronary disease  Would hold off on repeat ischemic evaluation at this time    If he does develop shortness of breath or worsening symptoms along with his chest pain, would reconsider ischemic eval likely with nuclear stress at that point, otherwise could follow-up 1 year with Cardiology "  · Oct 2020: Stress echo negative for stress-induced ischemia  · Oct 2020: ECHO LVEF 40%, mildly reduced systolic function, [id number], severe hypokinesis of the basal-mid inferoseptal and basal-mid inferior wall

## 2022-09-06 DIAGNOSIS — E03.8 OTHER SPECIFIED HYPOTHYROIDISM: ICD-10-CM

## 2022-09-06 RX ORDER — LEVOTHYROXINE SODIUM 0.07 MG/1
TABLET ORAL
Qty: 30 TABLET | Refills: 4 | Status: SHIPPED | OUTPATIENT
Start: 2022-09-06

## 2022-10-11 PROBLEM — R05.9 COUGH: Status: RESOLVED | Noted: 2022-05-17 | Resolved: 2022-10-11

## 2022-10-12 PROBLEM — Z12.11 COLON CANCER SCREENING: Status: RESOLVED | Noted: 2019-04-11 | Resolved: 2022-10-12

## 2022-10-19 ENCOUNTER — LAB (OUTPATIENT)
Dept: LAB | Facility: HOSPITAL | Age: 52
End: 2022-10-19
Payer: MEDICARE

## 2022-10-19 ENCOUNTER — OFFICE VISIT (OUTPATIENT)
Dept: FAMILY MEDICINE CLINIC | Facility: CLINIC | Age: 52
End: 2022-10-19
Payer: MEDICARE

## 2022-10-19 VITALS
WEIGHT: 173 LBS | OXYGEN SATURATION: 98 % | TEMPERATURE: 98 F | BODY MASS INDEX: 28.82 KG/M2 | HEIGHT: 65 IN | SYSTOLIC BLOOD PRESSURE: 140 MMHG | RESPIRATION RATE: 20 BRPM | HEART RATE: 88 BPM | DIASTOLIC BLOOD PRESSURE: 80 MMHG

## 2022-10-19 DIAGNOSIS — I10 ESSENTIAL HYPERTENSION, BENIGN: ICD-10-CM

## 2022-10-19 DIAGNOSIS — Z11.59 ENCOUNTER FOR HEPATITIS C SCREENING TEST FOR LOW RISK PATIENT: ICD-10-CM

## 2022-10-19 DIAGNOSIS — E03.9 ACQUIRED HYPOTHYROIDISM: Primary | ICD-10-CM

## 2022-10-19 DIAGNOSIS — Z12.5 SCREENING FOR PROSTATE CANCER: ICD-10-CM

## 2022-10-19 DIAGNOSIS — E03.9 ACQUIRED HYPOTHYROIDISM: ICD-10-CM

## 2022-10-19 PROBLEM — Z13.220 SCREENING FOR CHOLESTEROL LEVEL: Status: RESOLVED | Noted: 2020-03-25 | Resolved: 2022-10-19

## 2022-10-19 PROBLEM — R73.9 HYPERGLYCEMIA: Status: RESOLVED | Noted: 2020-03-25 | Resolved: 2022-10-19

## 2022-10-19 PROBLEM — R53.83 OTHER FATIGUE: Status: RESOLVED | Noted: 2020-03-25 | Resolved: 2022-10-19

## 2022-10-19 PROBLEM — Z12.11 SCREENING FOR COLON CANCER: Status: RESOLVED | Noted: 2020-03-25 | Resolved: 2022-10-19

## 2022-10-19 PROBLEM — B35.1 TOENAIL FUNGUS: Status: RESOLVED | Noted: 2020-09-29 | Resolved: 2022-10-19

## 2022-10-19 PROBLEM — Z00.00 MEDICARE ANNUAL WELLNESS VISIT, SUBSEQUENT: Status: RESOLVED | Noted: 2020-04-28 | Resolved: 2022-10-19

## 2022-10-19 PROBLEM — Z00.8 MEDICAL CLEARANCE FOR PSYCHIATRIC ADMISSION: Status: RESOLVED | Noted: 2022-02-15 | Resolved: 2022-10-19

## 2022-10-19 PROBLEM — Z23 NEEDS FLU SHOT: Status: RESOLVED | Noted: 2020-09-29 | Resolved: 2022-10-19

## 2022-10-19 PROBLEM — F32.4 MAJOR DEPRESSIVE DISORDER WITH SINGLE EPISODE, IN PARTIAL REMISSION (HCC): Status: RESOLVED | Noted: 2020-08-05 | Resolved: 2022-10-19

## 2022-10-19 PROBLEM — R07.89 ATYPICAL CHEST PAIN: Status: RESOLVED | Noted: 2022-02-15 | Resolved: 2022-10-19

## 2022-10-19 PROBLEM — F32.A DEPRESSION: Status: RESOLVED | Noted: 2022-03-02 | Resolved: 2022-10-19

## 2022-10-19 LAB
HCV AB SER QL: NORMAL
TSH SERPL DL<=0.05 MIU/L-ACNC: 4.11 UIU/ML (ref 0.45–4.5)

## 2022-10-19 PROCEDURE — 36415 COLL VENOUS BLD VENIPUNCTURE: CPT

## 2022-10-19 PROCEDURE — 84153 ASSAY OF PSA TOTAL: CPT

## 2022-10-19 PROCEDURE — 86803 HEPATITIS C AB TEST: CPT

## 2022-10-19 PROCEDURE — 99214 OFFICE O/P EST MOD 30 MIN: CPT | Performed by: FAMILY MEDICINE

## 2022-10-19 PROCEDURE — 84154 ASSAY OF PSA FREE: CPT

## 2022-10-19 PROCEDURE — 84443 ASSAY THYROID STIM HORMONE: CPT

## 2022-10-19 RX ORDER — AMLODIPINE BESYLATE 5 MG/1
5 TABLET ORAL DAILY
Qty: 30 TABLET | Refills: 5 | Status: SHIPPED | OUTPATIENT
Start: 2022-10-19

## 2022-10-19 NOTE — PROGRESS NOTES
Name: Basilio Hernandez      : 1970      MRN: [id number]  Encounter Provider: James Boyer MD  Encounter Date: 10/19/2022   Encounter department:   [facility]     1  Acquired hypothyroidism  -     TSH, 3rd generation; Future    2  Essential hypertension, benign  -     amLODIPine (NORVASC) 5 mg tablet; Take 1 tablet (5 mg total) by mouth daily           Subjective      Follow up depression and feet pain  Review of Systems   Constitutional: Negative for diaphoresis, fatigue, fever and unexpected weight change  Respiratory: Negative for apnea, cough, choking, chest tightness and shortness of breath  Cardiovascular: Negative for chest pain, palpitations and leg swelling  Gastrointestinal: Negative for abdominal distention, abdominal pain, anal bleeding, blood in stool and constipation  Musculoskeletal: Negative for arthralgias, back pain, gait problem and joint swelling  Neurological: Negative for dizziness, facial asymmetry, light-headedness and headaches  Psychiatric/Behavioral: Negative for behavioral problems, dysphoric mood and self-injury  The patient is not nervous/anxious  Current Outpatient Medications on File Prior to Visit   Medication Sig   • ARIPiprazole (ABILIFY) 5 mg tablet Take 1 tablet (5 mg total) by mouth daily   • escitalopram (LEXAPRO) 20 mg tablet 1 tab daily in morning   • levothyroxine 75 mcg tablet TAKE ONE TABLET BY MOUTH ONCE DAILY IN THE MORNING   • [DISCONTINUED] Artificial Tears 1 4 % ophthalmic solution    • [DISCONTINUED] benzonatate (TESSALON) 200 MG capsule Take 1 capsule (200 mg total) by mouth as needed in the morning and 1 capsule (200 mg total) as needed at noon and 1 capsule (200 mg total) as needed in the evening for cough         Objective     /80 (BP Location: Left arm, Patient Position: Sitting, Cuff Size: Standard)   Pulse 88   Temp 98 °F (36 7 °C) (Temporal)   Resp 20   Ht 5' 5" (1 651 m)   Wt 78 5 kg (173 lb)   SpO2 98%   BMI 28 79 kg/m²     Physical Exam  Vitals and nursing note reviewed  Neck:      Thyroid: No thyroid mass or thyromegaly  Vascular: No carotid bruit or JVD  Trachea: No tracheal tenderness  Cardiovascular:      Rate and Rhythm: Normal rate and regular rhythm  No extrasystoles are present  Pulses: Normal pulses  Heart sounds: Normal heart sounds  Heart sounds not distant  No friction rub  Pulmonary:      Effort: Pulmonary effort is normal  No tachypnea or bradypnea  Breath sounds: Normal breath sounds  No stridor  Abdominal:      General: There is no abdominal bruit  Palpations: There is no hepatomegaly or splenomegaly  Musculoskeletal:         General: Normal range of motion  Cervical back: No edema or rigidity  Skin:     General: Skin is warm and dry  Neurological:      Mental Status: He is oriented to person, place, and time  Deep Tendon Reflexes: Reflexes are normal and symmetric  Psychiatric:         Behavior: Behavior normal          Thought Content:  Thought content normal          Judgment: Judgment normal        Jorge Titus MD

## 2022-10-20 LAB
PSA FREE MFR SERPL: 20 %
PSA FREE SERPL-MCNC: 0.1 NG/ML
PSA SERPL-MCNC: 0.5 NG/ML (ref 0–4)

## 2022-10-21 ENCOUNTER — TELEPHONE (OUTPATIENT)
Dept: PSYCHIATRY | Facility: CLINIC | Age: 52
End: 2022-10-21

## 2022-10-21 NOTE — TELEPHONE ENCOUNTER
Pt returned call  Pt stated he is not interested in med mgmt services anymore  Writer informed pt he will be removed from the wait list

## 2022-12-27 ENCOUNTER — OFFICE VISIT (OUTPATIENT)
Dept: CARDIOLOGY CLINIC | Facility: CLINIC | Age: 52
End: 2022-12-27

## 2022-12-27 VITALS
WEIGHT: 173.4 LBS | DIASTOLIC BLOOD PRESSURE: 80 MMHG | BODY MASS INDEX: 27.21 KG/M2 | SYSTOLIC BLOOD PRESSURE: 126 MMHG | HEART RATE: 103 BPM | HEIGHT: 67 IN | OXYGEN SATURATION: 98 %

## 2022-12-27 DIAGNOSIS — F41.1 GENERALIZED ANXIETY DISORDER: ICD-10-CM

## 2022-12-27 DIAGNOSIS — F39 MOOD DISORDER WITH PSYCHOSIS (HCC): ICD-10-CM

## 2022-12-27 DIAGNOSIS — R07.89 OTHER CHEST PAIN: Primary | ICD-10-CM

## 2022-12-27 RX ORDER — POLYVINYL ALCOHOL 14 MG/ML
SOLUTION/ DROPS OPHTHALMIC
COMMUNITY
Start: 2022-12-26

## 2022-12-27 NOTE — PROGRESS NOTES
Cardiology   MD Corina Wellington MD Charley Simpers, DO, MARLEE Tovar MD Emerson Sic, DO, Estle Like, DO, [facility]  -------------------------------------------------------------------  [facility]  [address]  [place], [place] [id number] [phone]  [phone]  12/27/22  Huan Grubbs  YOB: 1970   MRN: [id number]      Referring Physician: Anjali York MD  [address]  [address]     HPI: Huan Grubbs is a 47 y o  male with: Hodgkin's lymphoma in the past which has been cured, for which he received radiation to the chest as well as chemotherapy in 1993, hypothyroidism, depression who presents today for reevaluation  Last year he complained of some atypical chest pain that occurred after lifting heavy boxes, stress echo was negative for stress-induced ischemia in 2020  His symptoms had resolved by the time I saw him last year  He was in the emergency department in August 2022 with chest pain that was present for about 2 days, described as sharp in nature, lasting for couple minutes and totally pain-free between episodes, can happen at rest or with activity, nothing makes it better or worse  Blood pressure is mildly elevated at the time, cardiac enzymes were negative  lipid panel last year shows total cholesterol 166 triglycerides 84 HDL 60 LDL 89  He presents today for follow-up  He did have episode of chest pain several months ago as described above  He continues to get symptoms like this every now and then, he notes most frequently when he is lifting boxes that are heavier  He states he will feel pressure in his chest and he knows that this is when he needs to stop  Intermittently he gets pain that goes down his left arm as well up to his left hand  + SOB    Review of Systems   Constitutional: Negative for chills and fever     HENT: Negative for facial swelling and sore throat  Eyes: Negative for visual disturbance  Respiratory: Negative for cough, chest tightness, shortness of breath and wheezing  Cardiovascular: Negative for chest pain, palpitations and leg swelling  Gastrointestinal: Negative for abdominal pain, blood in stool, constipation, diarrhea, nausea and vomiting  Endocrine: Negative for cold intolerance and heat intolerance  Genitourinary: Negative for decreased urine volume, difficulty urinating, dysuria and hematuria  Musculoskeletal: Negative for arthralgias, back pain and myalgias  Skin: Negative for rash  Neurological: Negative for dizziness, syncope, weakness and numbness  Psychiatric/Behavioral: Negative for agitation, behavioral problems and confusion  The patient is not nervous/anxious  OBJECTIVE  Vitals:    12/27/22 0816   BP: 126/80   Pulse: 103   SpO2: 98%       Physical Exam  Constitutional: awake, alert and oriented, in no acute distress, no obvious deformities  Head: Normocephalic, without obvious abnormality, atraumatic  Eyes: conjunctivae clear and moist  Sclera anicteric  No xanthelasmas  Pupils equal bilaterally  Extraocular motions are full  Ear nose mouth and throat: ears are symmetrical bilaterally, hearing appears to be equal bilaterally, no nasal discharge or epistaxis, oropharynx is clear with moist mucous membranes  Neck: Trachea is midline, neck is supple, no thyromegaly or significant lymphadenopathy, there is full range of motion    Lungs: clear to auscultation bilaterally, no wheezes, no rales, no rhonchi, no accessory muscle use, breathing is nonlabored  Heart: regular rate and rhythm, S1, S2 normal, no murmur, no click, no rub and no gallop, no lower extremity edema  Abdomen: soft, non-tender; bowel sounds normal; no masses, no organomegaly  Psychiatric: Patient is oriented to time, place, person, mood/affect is negative for depression, anxiety, agitation, appears to have appropriate insight  Skin: Skin is warm, dry, intact  No obvious rashes or lesions on exposed extremities  Nail beds are pink with no cyanosis or clubbing  EKG:  No results found for this visit on 22  IMPRESSION:  Atypical chest pain  History of radiation to the mediastinum  History of Hodgkin's lymphoma, resolved  History of chemotherapy for Hodgkin's lymphoma    DISCUSSION/RECOMMENDATIONS:  He continues to have off-and-on chest pain with radiation down his left arm to left hand, associated with exertion  Stress echo was negative in  however symptoms have continued to persist   His blood pressure is controlled, lipids are controlled  Given his persistent symptoms, will elect to investigate with more sensitive test with nuclear stress test for further ischemic evaluation  Continue with amlodipine  We will plan for follow-up after nuclear stress    Rosalba Ureña DO, NILESH, [facility]  --------------------------------------------------------------------------------  TREADMILL STRESS  No results found for this or any previous visit      ----------------------------------------------------------------------------------------------  NUCLEAR STRESS TEST: No results found for this or any previous visit  No results found for this or any previous visit       --------------------------------------------------------------------------------  CATH:  No results found for this or any previous visit     --------------------------------------------------------------------------------  ECHO:   Results for orders placed during the hospital encounter of 10/12/20    Echo complete with contrast if indicated    Narrative  [address] [place] [address]    [place], [address] [facility] [address] [facility]  [phone]    Transthoracic Echocardiogram  2D, M-mode, Doppler, and Color Doppler    Study date:  12-Oct-2020    Patient: Tom Soto  MR number: [id number]  Account number: [de-identified]  : 1970  Age: 45 years  Gender: Male  Status: Outpatient  Location: [facility] Echo 3  Height: 65 in  Weight: 169 6 lb  BP: 110/ 82 mmHg    Indications: Systolic murmur, atypical chest pain  Diagnoses: R01 1 - Cardiac murmur, unspecified, R07 9 - Chest pain, unspecified    Sonographer:  Desiree Vidal RDCS  Primary Physician:  Kedar Rice  Referring Physician:  SOREN Gasca  Group:  [facility]  Interpreting Physician:  SOREN Gasca     SUMMARY    PROCEDURE INFORMATION:  This was a technically difficult study  Echocardiographic views were limited due to poor acoustic window availability  LEFT VENTRICLE:  Systolic function was mildly reduced by visual assessment  Ejection fraction was estimated to be around 40 %  There was severe hypokinesis of the basal-mid inferoseptal and basal-mid inferior wall(s)  Definity ultrasound contrast was not used during this study, but would be helpful for more accurate assessment of LV wall motion and ejection fraction  Doppler parameters were consistent with abnormal left ventricular relaxation (grade 1 diastolic dysfunction)  MITRAL VALVE:  There was mild annular calcification  There was trace regurgitation  TRICUSPID VALVE:  There was mild regurgitation  HISTORY: PRIOR HISTORY: Hodgkin's lymphoma, hypothyroidism  PROCEDURE: The study was performed in the [facility] Echo 3  This was a routine study  Contrast was indicated but not used because a stress echocardiogram using a more powerful imaging platform was to be completed immediately after this study;  wall motion quantification was to be completed then, with our without contrast as indicated  The transthoracic approach was used  The study included complete 2D imaging, M-mode, complete spectral Doppler, and color Doppler  The heart rate  was 97 bpm, at the start of the study  Images were obtained from the parasternal, apical, subcostal, and suprasternal notch acoustic windows   Echocardiographic views were limited due to poor acoustic window availability  This was a  technically difficult study  LEFT VENTRICLE: Size was normal  Systolic function was mildly reduced by visual assessment  Ejection fraction was estimated to be around 40 %  There was severe hypokinesis of the basal-mid inferoseptal and basal-mid inferior wall(s)  Definity ultrasound contrast was not used during this study, but would be helpful for more accurate assessment of LV wall motion and ejection fraction  Wall thickness was normal  DOPPLER: Doppler parameters were consistent with abnormal  left ventricular relaxation (grade 1 diastolic dysfunction)  RIGHT VENTRICLE: The size was normal  Systolic function was normal  Wall thickness was normal     LEFT ATRIUM: Size was normal     RIGHT ATRIUM: Size was normal     MITRAL VALVE: There was mild annular calcification  Valve structure was normal  There was normal leaflet separation  DOPPLER: The transmitral velocity was within the normal range  There was no evidence for stenosis  There was trace  regurgitation  AORTIC VALVE: The valve was trileaflet  Leaflets exhibited normal thickness, normal cuspal separation, and sclerosis  DOPPLER: Transaortic velocity was within the normal range  There was no evidence for stenosis  There was no  regurgitation  TRICUSPID VALVE: The valve structure was normal  There was normal leaflet separation  DOPPLER: The transtricuspid velocity was within the normal range  There was no evidence for stenosis  There was mild regurgitation  Estimated peak PA  pressure was 25 mmHg  PULMONIC VALVE: Leaflets exhibited normal thickness, no calcification, and normal cuspal separation  DOPPLER: The transpulmonic velocity was within the normal range  There was no regurgitation  PERICARDIUM: There was no pericardial effusion  The pericardium was normal in appearance  AORTA: The root exhibited normal size  SYSTEMIC VEINS: IVC: The inferior vena cava was normal in size and course  Respirophasic changes were normal     SYSTEM MEASUREMENT TABLES    2D  Ao Diam: 3 2 cm  EDV(Teich): 109 1 ml  IVSd: 0 8 cm  LA Diam: 3 4 cm  LAAs A2C: 15 3 cm2  LAAs A4C: 17 8 cm2  LAESV A-L A2C: 45 1 ml  LAESV A-L A4C: 61 3 ml  LAESV Index (A-L): 28 5 ml/m2  LAESV MOD A2C: 42 6 ml  LAESV MOD A4C: 56 3 ml  LAESV(A-L): 52 7 ml  LAESV(MOD BP): 49 ml  LALs A2C: 4 4 cm  LALs A4C: 4 4 cm  LVIDd: 4 8 cm  LVPWd: 0 8 cm  RAAs: 12 5 cm2  RAESV A-L: 35 4 ml  RAESV MOD: 32 1 ml  RALs: 3 8 cm  RVIDd: 2 8 cm    CW  TR Vmax: 2 3 m/s  TR maxP 1 mmHg    MM  TAPSE: 2 3 cm    PW  E' Av 1 m/s  E' Lat: 0 1 m/s  E' Sept: 0 m/s  E/E' Av 4  E/E' Lat: 7 1  E/E' Sept: 13 8  MV A Semaj: 0 9 m/s  MV Dec Mercer: 3 5 m/s2  MV DecT: 179 9 ms  MV E Semaj: 0 6 m/s  MV E/A Ratio: 0 7  MV PHT: 52 2 ms  MVA By PHT: 4 2 cm2    [facility]    Prepared and electronically signed by    SOREN Del Real  Signed 12-Oct-2020 16:25:29    No results found for this or any previous visit     --------------------------------------------------------------------------------  HOLTER  No results found for this or any previous visit  No results found for this or any previous visit     --------------------------------------------------------------------------------  CAROTIDS  No results found for this or any previous visit      --------------------------------------------------------------------------------  Diagnoses and all orders for this visit:    Other chest pain  -     NM myocardial perfusion spect (rx stress and/or rest);  Future    Mood disorder with psychosis (HCC)    Generalized anxiety disorder    Other orders  -     Artificial Tears 1 4 % ophthalmic solution     ======================================================    Past Medical History:   Diagnosis Date   • Cancer ([facility] [address] )     Hodgkins lymphoma   • Chest pain radiating to arm    • Colon polyp    • Depression    • Disease of thyroid gland • Mood disorder with psychosis ([facility] ) 7/12/2018   • Psychiatric disorder      Past Surgical History:   Procedure Laterality Date   • COLONOSCOPY     • COLONOSCOPY W/ POLYPECTOMY  07/2021   • HERNIA REPAIR           Medications  Current Outpatient Medications   Medication Sig Dispense Refill   • amLODIPine (NORVASC) 5 mg tablet Take 1 tablet (5 mg total) by mouth daily 30 tablet 5   • ARIPiprazole (ABILIFY) 5 mg tablet Take 1 tablet (5 mg total) by mouth daily 30 tablet 5   • Artificial Tears 1 4 % ophthalmic solution      • escitalopram (LEXAPRO) 20 mg tablet 1 tab daily in morning 30 tablet 5   • levothyroxine 75 mcg tablet TAKE ONE TABLET BY MOUTH ONCE DAILY IN THE MORNING 30 tablet 4     No current facility-administered medications for this visit          No Known Allergies    Social History     Socioeconomic History   • Marital status: /Civil Union     Spouse name: Not on file   • Number of children: Not on file   • Years of education: Not on file   • Highest education level: Not on file   Occupational History   • Not on file   Tobacco Use   • Smoking status: Never   • Smokeless tobacco: Never   Vaping Use   • Vaping Use: Never used   Substance and Sexual Activity   • Alcohol use: No   • Drug use: No   • Sexual activity: Not Currently   Other Topics Concern   • Not on file   Social History Narrative   • Not on file     Social Determinants of Health     Financial Resource Strain: Not on file   Food Insecurity: Not on file   Transportation Needs: Not on file   Physical Activity: Not on file   Stress: Not on file   Social Connections: Not on file   Intimate Partner Violence: Not on file   Housing Stability: Not on file        Family History   Problem Relation Age of Onset   • Colon cancer Father        Lab Results   Component Value Date    WBC 9 54 08/11/2022    HGB 13 9 08/11/2022    HCT 41 7 08/11/2022    MCV 93 08/11/2022     08/11/2022      Lab Results   Component Value Date    SODIUM 138 08/11/2022    K 4 1 08/11/2022     08/11/2022    CO2 26 08/11/2022    BUN 23 08/11/2022    CREATININE 1 14 08/11/2022    GLUC 98 08/11/2022    CALCIUM 8 7 08/11/2022      Lab Results   Component Value Date    HGBA1C 5 3 02/25/2022      No results found for: CHOL  Lab Results   Component Value Date    HDL 60 12/09/2021    HDL 58 03/25/2020     Lab Results   Component Value Date    LDLCALC 89 12/09/2021    LDLCALC 108 03/25/2020     Lab Results   Component Value Date    TRIG 84 12/09/2021    TRIG 106 03/25/2020     No results found for: CHOLHDL   No results found for: INR, PROTIME       Patient Active Problem List    Diagnosis Date Noted   • Depression with anxiety 03/02/2022   • Rec[facility] ) 02/15/2022   • Pain of left heel 09/29/2020   • Generalized anxiety disorder 08/05/2020   • Kidney lesion 01/29/2019   • Mood disorder with psychosis [facility] ) 07/12/2018   • Family history of colon cancer 01/07/2016   • History of Hodgkin's lymphoma 01/06/2016   • Hypothyroidism 10/13/2014       Portions of the record may have been created with voice recognition software  Occasional wrong word or "sound a like" substitutions may have occurred due to the inherent limitations of voice recognition software  Read the chart carefully and recognize, using context, where substitutions have occurred      Danya Campbell DO, [facility]  12/27/2022 8:33 AM

## 2023-01-05 ENCOUNTER — HOSPITAL ENCOUNTER (OUTPATIENT)
Dept: NUCLEAR MEDICINE | Facility: HOSPITAL | Age: 53
Discharge: HOME | End: 2023-01-05

## 2023-01-05 ENCOUNTER — HOSPITAL ENCOUNTER (OUTPATIENT)
Dept: NON INVASIVE DIAGNOSTICS | Facility: HOSPITAL | Age: 53
Discharge: HOME | End: 2023-01-05

## 2023-01-05 VITALS
HEIGHT: 67 IN | OXYGEN SATURATION: 98 % | SYSTOLIC BLOOD PRESSURE: 142 MMHG | WEIGHT: 173 LBS | HEART RATE: 92 BPM | DIASTOLIC BLOOD PRESSURE: 86 MMHG | BODY MASS INDEX: 27.15 KG/M2 | RESPIRATION RATE: 16 BRPM

## 2023-01-05 DIAGNOSIS — R07.89 OTHER CHEST PAIN: ICD-10-CM

## 2023-01-05 LAB
MAX HR PERCENT: 76 %
MAX HR: 127 BPM
NUC STRESS EJECTION FRACTION: 50 %
RATE PRESSURE PRODUCT: NORMAL
SL CV REST NUCLEAR ISOTOPE DOSE: 10.3 MCI
SL CV STRESS NUCLEAR ISOTOPE DOSE: 33 MCI
SL CV STRESS RECOVERY BP: NORMAL MMHG
SL CV STRESS RECOVERY HR: 106 BPM
SL CV STRESS RECOVERY O2 SAT: 98 %
STRESS ANGINA INDEX: 0
STRESS BASELINE BP: NORMAL MMHG
STRESS BASELINE HR: 92 BPM
STRESS DUKE TREADMILL SCORE: 3
STRESS O2 SAT REST: 98 %
STRESS PEAK HR: 118 BPM
STRESS POST ESTIMATED WORKLOAD: 1.4 METS
STRESS POST EXERCISE DUR MIN: 3 MIN
STRESS POST EXERCISE DUR SEC: 0 SEC
STRESS POST O2 SAT PEAK: 98 %
STRESS POST PEAK BP: 112 MMHG
STRESS ST DEPRESSION: 0 MM
STRESS/REST PERFUSION RATIO: 1.08

## 2023-01-05 RX ADMIN — REGADENOSON 0.4 MG: 0.08 INJECTION, SOLUTION INTRAVENOUS at 09:45

## 2023-01-06 ENCOUNTER — TELEPHONE (OUTPATIENT)
Dept: CARDIOLOGY CLINIC | Facility: CLINIC | Age: 53
End: 2023-01-06

## 2023-01-06 NOTE — TELEPHONE ENCOUNTER
----- Message from Stephanie Victoria DO sent at 1/5/2023  5:12 PM EST -----  Please call the patient regarding their normal result

## 2023-01-09 LAB
CHEST PAIN STATEMENT: NORMAL
MAX DIASTOLIC BP: 86 MMHG
MAX HEART RATE: 127 BPM
MAX PREDICTED HEART RATE: 167 BPM
MAX. SYSTOLIC BP: 142 MMHG
PROTOCOL NAME: NORMAL
REASON FOR TERMINATION: NORMAL
TARGET HR FORMULA: NORMAL
TEST INDICATION: NORMAL
TIME IN EXERCISE PHASE: NORMAL

## 2023-02-07 ENCOUNTER — OFFICE VISIT (OUTPATIENT)
Dept: CARDIOLOGY CLINIC | Facility: CLINIC | Age: 53
End: 2023-02-07

## 2023-02-07 VITALS
WEIGHT: 175 LBS | SYSTOLIC BLOOD PRESSURE: 130 MMHG | BODY MASS INDEX: 27.41 KG/M2 | HEART RATE: 104 BPM | DIASTOLIC BLOOD PRESSURE: 80 MMHG

## 2023-02-07 DIAGNOSIS — R07.9 CHEST PAIN, UNSPECIFIED TYPE: ICD-10-CM

## 2023-02-07 DIAGNOSIS — F41.1 GENERALIZED ANXIETY DISORDER: Primary | ICD-10-CM

## 2023-02-07 DIAGNOSIS — F41.8 DEPRESSION WITH ANXIETY: ICD-10-CM

## 2023-02-07 NOTE — PROGRESS NOTES
Cardiology   MD Emely Reyes MD Pasco Hands, DO, MARLEE Francis MD Birt Seat, DO, Liu Cullen DO, [facility]  -------------------------------------------------------------------  [facility]  [address], [place] [address]  [phone]  [phone]  02/07/23  Patrick Morris  YOB: 1970   MRN: [id number]      Referring Physician: Landy Perez MD  [address] [place] [address] 305  [address] [address] [place] [address]     HPI: Patrick Morris is a 47 y o  male with: Hodgkin's lymphoma in the past which has been cured, for which he received radiation to the chest as well as chemotherapy in 1993, hypothyroidism, depression who presents today for reevaluation      Last year he complained of some atypical chest pain that occurred after lifting heavy boxes, stress echo was negative for stress-induced ischemia in 2020  His symptoms had resolved by the time I saw him last year  He was in the emergency department in August 2022 with chest pain that was present for about 2 days, described as sharp in nature, lasting for couple minutes and totally pain-free between episodes, can happen at rest or with activity, nothing makes it better or worse  Blood pressure is mildly elevated at the time, cardiac enzymes were negative  lipid panel last year shows total cholesterol 166 triglycerides 84 HDL 60 LDL 89  Because of his symptoms I sent him for nuclear stress test which with prone imaging appeared to be nonischemic  His chest pain and left arm pain have resolved  He is under a lot of stress and anxiety currently as his wife unfortunately just passed away due to a aneurysm in her brain  Review of Systems   Constitutional: Negative for chills and fever  HENT: Negative for facial swelling and sore throat  Eyes: Negative for visual disturbance     Respiratory: Negative for cough, chest tightness, shortness of breath and wheezing  Cardiovascular: Negative for chest pain, palpitations and leg swelling  Gastrointestinal: Negative for abdominal pain, blood in stool, constipation, diarrhea, nausea and vomiting  Endocrine: Negative for cold intolerance and heat intolerance  Genitourinary: Negative for decreased urine volume, difficulty urinating, dysuria and hematuria  Musculoskeletal: Negative for arthralgias, back pain and myalgias  Skin: Negative for rash  Neurological: Negative for dizziness, syncope, weakness and numbness  Psychiatric/Behavioral: Negative for agitation, behavioral problems and confusion  The patient is nervous/anxious  OBJECTIVE  Vitals:    02/07/23 0930   BP: 130/80   Pulse: 104       Physical Exam  Constitutional: awake, alert and oriented, in no acute distress, no obvious deformities  Head: Normocephalic, without obvious abnormality, atraumatic  Eyes: conjunctivae clear and moist  Sclera anicteric  No xanthelasmas  Pupils equal bilaterally  Extraocular motions are full  Ear nose mouth and throat: ears are symmetrical bilaterally, hearing appears to be equal bilaterally, no nasal discharge or epistaxis, oropharynx is clear with moist mucous membranes  Neck: Trachea is midline, neck is supple, no thyromegaly or significant lymphadenopathy, there is full range of motion  Lungs: clear to auscultation bilaterally, no wheezes, no rales, no rhonchi, no accessory muscle use, breathing is nonlabored  Heart: regular rate and rhythm, S1, S2 normal, no murmur, no click, no rub and no gallop, no lower extremity edema  Abdomen: soft, non-tender; bowel sounds normal; no masses, no organomegaly  Psychiatric: Patient is oriented to time, place, person, mood/affect is negative for depression, anxiety, agitation, appears to have appropriate insight  Skin: Skin is warm, dry, intact  No obvious rashes or lesions on exposed extremities  Nail beds are pink with no cyanosis or clubbing      EKG:  No results found for this visit on 02/07/23  IMPRESSION:  Atypical chest pain  History of radiation to the mediastinum  History of Hodgkin's lymphoma, resolved  History of chemotherapy for Hodgkin's lymphoma    DISCUSSION/RECOMMENDATIONS:  Blood pressure is controlled today  He has no more chest pain  Nuclear stress test was nonischemic  Lipids were controlled  We will continue with his current medications without alteration at this time  Hold off on further cardiac testing given the nonischemic stress test and resolution of his symptoms    Josh Junior DO, [facility], [facility]  --------------------------------------------------------------------------------  TREADMILL STRESS  No results found for this or any previous visit      ----------------------------------------------------------------------------------------------  NUCLEAR STRESS TEST: No results found for this or any previous visit  Results for orders placed during the hospital encounter of 01/05/23    NM myocardial perfusion spect (rx stress and/or rest)    Interpretation Summary  •  Stress Function: Left ventricular function post-stress is normal  Post-stress ejection fraction is 50 %  •  Stress ECG: No ST deviation is noted  There were no arrhythmias during recovery    The ECG was not diagnostic due to pharmacological (vasodilator) stress  •  Perfusion: There is a left ventricular perfusion defect that is small in size with mild reduction in uptake present in the basal inferior location(s) that is paradoxical  The defect appears to be an artifact caused by diaphragmatic activity  There is a left ventricular perfusion defect that is small in size with mild reduction in uptake present in the basal anteroseptal location(s) that is fixed  The defect appears to be probable artifact as this defect resolves on prone imaging  •  Stress Combined Conclusion: There is image artifact, without diagnostic evidence for perfusion abnormality    •  Perfusion Defect Conclusion: There is no evidence of transient ischemic dilation (TID)       --------------------------------------------------------------------------------  CATH:  No results found for this or any previous visit     --------------------------------------------------------------------------------  ECHO:   Results for orders placed during the hospital encounter of 10/12/20    Echo complete with contrast if indicated    Narrative  [address] [place] [address] 35  [facility], [address] [facility]  [phone]    Transthoracic Echocardiogram  2D, M-mode, Doppler, and Color Doppler    Study date:  12-Oct-2020    Patient: Tom Soto  MR number: [id number]  Account number: [de-identified]  : 1970  Age: 45 years  Gender: Male  Status: Outpatient  Location: [facility] 3  Height: 65 in  Weight: 169 6 lb  BP: 110/ 82 mmHg    Indications: Systolic murmur, atypical chest pain  Diagnoses: R01 1 - Cardiac murmur, unspecified, R07 9 - Chest pain, unspecified    Sonographer:  Mil Barton RDCS  Primary Physician:  [facility]   Referring Physician:  SOREN Torres  Group:  [facility]  Interpreting Physician:  SOREN Torres     SUMMARY    PROCEDURE INFORMATION:  This was a technically difficult study  Echocardiographic views were limited due to poor acoustic window availability  LEFT VENTRICLE:  Systolic function was mildly reduced by visual assessment  Ejection fraction was estimated to be around 40 %  There was severe hypokinesis of the basal-mid inferoseptal and basal-mid inferior wall(s)  Definity ultrasound contrast was not used during this study, but would be helpful for more accurate assessment of LV wall motion and ejection fraction  Doppler parameters were consistent with abnormal left ventricular relaxation (grade 1 diastolic dysfunction)  MITRAL VALVE:  There was mild annular calcification  There was trace regurgitation      TRICUSPID VALVE:  There was mild regurgitation  HISTORY: PRIOR HISTORY: Hodgkin's lymphoma, hypothyroidism  PROCEDURE: The study was performed in the [facility] 3  This was a routine study  Contrast was indicated but not used because a stress echocardiogram using a more powerful imaging platform was to be completed immediately after this study;  wall motion quantification was to be completed then, with our without contrast as indicated  The transthoracic approach was used  The study included complete 2D imaging, M-mode, complete spectral Doppler, and color Doppler  The heart rate  was 97 bpm, at the start of the study  Images were obtained from the parasternal, apical, subcostal, and suprasternal notch acoustic windows  Echocardiographic views were limited due to poor acoustic window availability  This was a  technically difficult study  LEFT VENTRICLE: Size was normal  Systolic function was mildly reduced by visual assessment  Ejection fraction was estimated to be around 40 %  There was severe hypokinesis of the basal-mid inferoseptal and basal-mid inferior wall(s)  Definity ultrasound contrast was not used during this study, but would be helpful for more accurate assessment of LV wall motion and ejection fraction  Wall thickness was normal  DOPPLER: Doppler parameters were consistent with abnormal  left ventricular relaxation (grade 1 diastolic dysfunction)  RIGHT VENTRICLE: The size was normal  Systolic function was normal  Wall thickness was normal     LEFT ATRIUM: Size was normal     RIGHT ATRIUM: Size was normal     MITRAL VALVE: There was mild annular calcification  Valve structure was normal  There was normal leaflet separation  DOPPLER: The transmitral velocity was within the normal range  There was no evidence for stenosis  There was trace  regurgitation  AORTIC VALVE: The valve was trileaflet  Leaflets exhibited normal thickness, normal cuspal separation, and sclerosis   DOPPLER: Transaortic velocity was within the normal range  There was no evidence for stenosis  There was no  regurgitation  TRICUSPID VALVE: The valve structure was normal  There was normal leaflet separation  DOPPLER: The transtricuspid velocity was within the normal range  There was no evidence for stenosis  There was mild regurgitation  Estimated peak PA  pressure was 25 mmHg  PULMONIC VALVE: Leaflets exhibited normal thickness, no calcification, and normal cuspal separation  DOPPLER: The transpulmonic velocity was within the normal range  There was no regurgitation  PERICARDIUM: There was no pericardial effusion  The pericardium was normal in appearance  AORTA: The root exhibited normal size  SYSTEMIC VEINS: IVC: The inferior vena cava was normal in size and course  Respirophasic changes were normal     SYSTEM MEASUREMENT TABLES    2D  Ao Diam: 3 2 cm  EDV(Teich): 109 1 ml  IVSd: 0 8 cm  LA Diam: 3 4 cm  LAAs A2C: 15 3 cm2  LAAs A4C: 17 8 cm2  LAESV A-L A2C: 45 1 ml  LAESV A-L A4C: 61 3 ml  LAESV Index (A-L): 28 5 ml/m2  LAESV MOD A2C: 42 6 ml  LAESV MOD A4C: 56 3 ml  LAESV(A-L): 52 7 ml  LAESV(MOD BP): 49 ml  LALs A2C: 4 4 cm  LALs A4C: 4 4 cm  LVIDd: 4 8 cm  LVPWd: 0 8 cm  RAAs: 12 5 cm2  RAESV A-L: 35 4 ml  RAESV MOD: 32 1 ml  RALs: 3 8 cm  RVIDd: 2 8 cm    CW  TR Vmax: 2 3 m/s  TR maxP 1 mmHg    MM  TAPSE: 2 3 cm    PW  E' Av 1 m/s  E' Lat: 0 1 m/s  E' Sept: 0 m/s  E/E' Av 4  E/E' Lat: 7 1  E/E' Sept: 13 8  MV A Semaj: 0 9 m/s  MV Dec [place]: 3 5 m/s2  MV DecT: 179 9 ms  MV E Semaj: 0 6 m/s  MV E/A Ratio: 0 7  MV PHT: 52 2 ms  MVA By PHT: 4 2 cm2    [facility] Accredited [facility]ch[facility]    Prepared and electronically signed by    SOREN Danielle  Signed 12-Oct-2020 16:25:29    No results found for this or any previous visit     --------------------------------------------------------------------------------  HOLTER  No results found for this or any previous visit      No results found for this or any previous visit     --------------------------------------------------------------------------------  CAROTIDS  No results found for this or any previous visit      --------------------------------------------------------------------------------  Diagnoses and all orders for this visit:    Generalized anxiety disorder    Chest pain, unspecified type    Depression with anxiety     ======================================================    Past Medical History:   Diagnosis Date   • Cancer ([place] [address] )     Hodgkins lymphoma   • Chest pain radiating to arm    • Colon polyp    • Depression    • Disease of thyroid gland    • Mood disorder with psychosis ([place] [address] ) 7/12/2018   • Psychiatric disorder      Past Surgical History:   Procedure Laterality Date   • COLONOSCOPY     • COLONOSCOPY W/ POLYPECTOMY  07/2021   • HERNIA REPAIR           Medications  Current Outpatient Medications   Medication Sig Dispense Refill   • amLODIPine (NORVASC) 5 mg tablet Take 1 tablet (5 mg total) by mouth daily 30 tablet 5   • ARIPiprazole (ABILIFY) 5 mg tablet Take 1 tablet (5 mg total) by mouth daily 30 tablet 5   • Artificial Tears 1 4 % ophthalmic solution      • escitalopram (LEXAPRO) 20 mg tablet 1 tab daily in morning 30 tablet 5   • levothyroxine 75 mcg tablet TAKE ONE TABLET BY MOUTH ONCE DAILY IN THE MORNING 30 tablet 4     No current facility-administered medications for this visit          No Known Allergies    Social History     Socioeconomic History   • Marital status: /Civil Union     Spouse name: Not on file   • Number of children: Not on file   • Years of education: Not on file   • Highest education level: Not on file   Occupational History   • Not on file   Tobacco Use   • Smoking status: Never   • Smokeless tobacco: Never   Vaping Use   • Vaping Use: Never used   Substance and Sexual Activity   • Alcohol use: No   • Drug use: No   • Sexual activity: Not Currently   Other Topics Concern   • Not on file   Social History Narrative • Not on file     Social Determinants of Health     Financial Resource Strain: Not on file   Food Insecurity: Not on file   Transportation Needs: Not on file   Physical Activity: Not on file   Stress: Not on file   Social Connections: Not on file   Intimate Partner Violence: Not on file   Housing Stability: Not on file        Family History   Problem Relation Age of Onset   • Colon cancer Father        Lab Results   Component Value Date    WBC 9 54 08/11/2022    HGB 13 9 08/11/2022    HCT 41 7 08/11/2022    MCV 93 08/11/2022     08/11/2022      Lab Results   Component Value Date    SODIUM 138 08/11/2022    K 4 1 08/11/2022     08/11/2022    CO2 26 08/11/2022    BUN 23 08/11/2022    CREATININE 1 14 08/11/2022    GLUC 98 08/11/2022    CALCIUM 8 7 08/11/2022      Lab Results   Component Value Date    HGBA1C 5 3 02/25/2022      No results found for: CHOL  Lab Results   Component Value Date    HDL 60 12/09/2021    HDL 58 03/25/2020     Lab Results   Component Value Date    LDLCALC 89 12/09/2021    [address] 108 03/25/2020     Lab Results   Component Value Date    TRIG 84 12/09/2021    TRIG 106 03/25/2020     No results found for: CHOLHDL   No results found for: INR, PROTIME       Patient Active Problem List    Diagnosis Date Noted   • Depression with anxiety 03/02/2022   • [facility] ) 02/15/2022   • Pain of left heel 09/29/2020   • Generalized anxiety disorder 08/05/2020   • Kidney lesion 01/29/2019   • Mood disorder with psychosis [facility] ) 07/12/2018   • Family history of colon cancer 01/07/2016   • History of Hodgkin's lymphoma 01/06/2016   • Hypothyroidism 10/13/2014       Portions of the record may have been created with voice recognition software  Occasional wrong word or "sound a like" substitutions may have occurred due to the inherent limitations of voice recognition software   Read the chart carefully and recognize, using context, where substitutions have occurred      Josh Junior DO, [facility]  2/7/2023 9:53 AM

## 2023-03-14 DIAGNOSIS — I10 ESSENTIAL HYPERTENSION, BENIGN: ICD-10-CM

## 2023-03-14 RX ORDER — AMLODIPINE BESYLATE 5 MG/1
TABLET ORAL
Qty: 30 TABLET | Refills: 4 | Status: SHIPPED | OUTPATIENT
Start: 2023-03-14

## 2023-05-31 ENCOUNTER — OFFICE VISIT (OUTPATIENT)
Dept: FAMILY MEDICINE CLINIC | Facility: CLINIC | Age: 53
End: 2023-05-31

## 2023-05-31 VITALS
WEIGHT: 166 LBS | TEMPERATURE: 98 F | BODY MASS INDEX: 26.06 KG/M2 | HEIGHT: 67 IN | OXYGEN SATURATION: 98 % | DIASTOLIC BLOOD PRESSURE: 70 MMHG | HEART RATE: 84 BPM | SYSTOLIC BLOOD PRESSURE: 130 MMHG | RESPIRATION RATE: 20 BRPM

## 2023-05-31 DIAGNOSIS — E03.9 ACQUIRED HYPOTHYROIDISM: ICD-10-CM

## 2023-05-31 DIAGNOSIS — I10 ESSENTIAL HYPERTENSION, BENIGN: ICD-10-CM

## 2023-05-31 DIAGNOSIS — E03.8 OTHER SPECIFIED HYPOTHYROIDISM: ICD-10-CM

## 2023-05-31 DIAGNOSIS — Z00.00 MEDICARE ANNUAL WELLNESS VISIT, SUBSEQUENT: Primary | ICD-10-CM

## 2023-05-31 DIAGNOSIS — F39 MOOD DISORDER WITH PSYCHOSIS (HCC): ICD-10-CM

## 2023-05-31 RX ORDER — AMLODIPINE BESYLATE 5 MG/1
5 TABLET ORAL DAILY
Qty: 30 TABLET | Refills: 4 | Status: SHIPPED | OUTPATIENT
Start: 2023-05-31

## 2023-05-31 RX ORDER — LEVOTHYROXINE SODIUM 0.07 MG/1
75 TABLET ORAL EVERY MORNING
Qty: 30 TABLET | Refills: 4 | Status: SHIPPED | OUTPATIENT
Start: 2023-05-31

## 2023-05-31 NOTE — PROGRESS NOTES
Assessment and Plan:     Problem List Items Addressed This Visit     BMI 26 0-26 9,adult    Essential hypertension, benign    Relevant Medications    amLODIPine (NORVASC) 5 mg tablet    Other Relevant Orders    Comprehensive metabolic panel    Lipid panel    Hypothyroidism    Relevant Medications    levothyroxine 75 mcg tablet    Other Relevant Orders    TSH, 3rd generation    Medicare annual wellness visit, subsequent - Primary    Mood disorder with psychosis ([facility] [address] )   BMI Counseling: Body mass index is 26 kg/m²  The BMI is above normal  Nutrition recommendations include reducing portion sizes  Exercise recommendations include exercising 3-5 times per week  Preventive health issues were discussed with patient, and age appropriate screening tests were ordered as noted in patient's After Visit Summary  Personalized health advice and appropriate referrals for health education or preventive services given if needed, as noted in patient's After Visit Summary  History of Present Illness:     Patient presents for a Medicare Wellness Visit    HPI   Patient is here for PE, not having any acute distress  Patient Care Team:  Naman Kellogg MD as PCP - General (Family Medicine)     Review of Systems:     Review of Systems   Constitutional: Positive for fatigue  Negative for chills, diaphoresis and fever  HENT: Negative for hearing loss, sinus pressure, sore throat and trouble swallowing  Eyes: Negative for photophobia, pain, redness and visual disturbance  Respiratory: Negative for cough, choking, chest tightness and shortness of breath  Cardiovascular: Negative for chest pain, palpitations and leg swelling  Gastrointestinal: Negative for abdominal pain  Genitourinary: Negative for difficulty urinating, dysuria, enuresis and flank pain  Musculoskeletal: Negative for arthralgias, back pain, gait problem and joint swelling     Neurological: Negative for dizziness, facial asymmetry, light-headedness and headaches  Psychiatric/Behavioral: Negative for agitation, behavioral problems, confusion and decreased concentration  Sad due to a 8 y o grandson stroke by a car and is recovering in ICU while intubated          Problem List:     Patient Active Problem List   Diagnosis   • History of Hodgkin's lymphoma   • Kidney lesion   • Hypothyroidism   • Medicare annual wellness visit, subsequent   • Generalized anxiety disorder   • Pain of left heel   • Family history of colon cancer   • Mood disorder with psychosis ([place] [id number] )   • Recurrent major depressive disorder ([place] [address] )   • Depression with anxiety   • BMI 26 0-26 9,adult   • Essential hypertension, benign      Past Medical and Surgical History:     Past Medical History:   Diagnosis Date   • Cancer ([place] [address] )     Hodgkins lymphoma   • Chest pain radiating to arm    • Colon polyp    • Depression    • Disease of thyroid gland    • Hypertension    • Mood disorder with psychosis ([place] [address] ) 07/12/2018   • Psychiatric disorder      Past Surgical History:   Procedure Laterality Date   • COLONOSCOPY     • COLONOSCOPY W/ POLYPECTOMY  07/2021   • HERNIA REPAIR        Family History:     Family History   Problem Relation Age of Onset   • Colon cancer Father       Social History:     Social History     Socioeconomic History   • Marital status: /Civil Union     Spouse name: None   • Number of children: None   • Years of education: None   • Highest education level: None   Occupational History   • None   Tobacco Use   • Smoking status: Never   • Smokeless tobacco: Never   Vaping Use   • Vaping Use: Never used   Substance and Sexual Activity   • Alcohol use: No   • Drug use: No   • Sexual activity: Not Currently   Other Topics Concern   • None   Social History Narrative   • None     Social Determinants of Health     Financial Resource Strain: Low Risk  (5/31/2023)    Overall Financial Resource Strain (CARDIA)    • Difficulty of Paying Living Expenses: Not hard at all   Food Insecurity: Not on file   Transportation Needs: No Transportation Needs (5/31/2023)    PRAPARE - Transportation    • Lack of Transportation (Medical): No    • Lack of Transportation (Non-Medical): No   Physical Activity: Not on file   Stress: Not on file   Social Connections: Not on file   Intimate Partner Violence: Not on file   Housing Stability: Not on file      Medications and Allergies:     Current Outpatient Medications   Medication Sig Dispense Refill   • amLODIPine (NORVASC) 5 mg tablet Take 1 tablet (5 mg total) by mouth daily 30 tablet 4   • levothyroxine 75 mcg tablet Take 1 tablet (75 mcg total) by mouth every morning 30 tablet 4   • ARIPiprazole (ABILIFY) 5 mg tablet Take 1 tablet (5 mg total) by mouth daily 30 tablet 5   • Artificial Tears 1 4 % ophthalmic solution      • escitalopram (LEXAPRO) 20 mg tablet 1 tab daily in morning 30 tablet 5     No current facility-administered medications for this visit  No Known Allergies   Immunizations:     Immunization History   Administered Date(s) Administered   • COVID-19 MODERNA VACC 0 5 ML IM 05/11/2021, 06/08/2021   • INFLUENZA 01/06/2016, 01/06/2016, 10/03/2016, 10/03/2016, 09/27/2018, 09/27/2018, 10/29/2019, 10/29/2019   • Influenza, recombinant, quadrivalent,injectable, preservative free 09/29/2020      Health Maintenance:         Topic Date Due   • Colorectal Cancer Screening  07/25/2026   • HIV Screening  Completed   • Hepatitis C Screening  Completed         Topic Date Due   • COVID-19 Vaccine (3 - Alycia Vikram series) 08/03/2021      Medicare Screening Tests and Risk Assessments:     Clint Love is here for his Subsequent Wellness visit  Last Medicare Wellness visit information reviewed, patient interviewed and updates made to the record today  Health Risk Assessment:   Patient rates overall health as good  Patient feels that their physical health rating is same  Patient is satisfied with their life  Eyesight was rated as same  Hearing was rated as same   Patient feels that their emotional and mental health rating is same  Patients states they are never, rarely angry  Patient states they are sometimes unusually tired/fatigued  Pain experienced in the last 7 days has been some  Patient's pain rating has been 2/10  Patient states that he has experienced no weight loss or gain in last 6 months  Depression Screening:   PHQ-9 Score: 4      Fall Risk Screening: In the past year, patient has experienced: no history of falling in past year      Home Safety:  Patient does not have trouble with stairs inside or outside of their home  Patient has working smoke alarms and has working carbon monoxide detector  Home safety hazards include: none  Nutrition:   Current diet is Regular  Medications:   Patient is currently taking over-the-counter supplements  OTC medications include: see medication list  Patient is able to manage medications  Activities of Daily Living (ADLs)/Instrumental Activities of Daily Living (IADLs):   Walk and transfer into and out of bed and chair?: Yes  Dress and groom yourself?: Yes    Bathe or shower yourself?: Yes    Feed yourself?  Yes  Do your laundry/housekeeping?: Yes  Manage your money, pay your bills and track your expenses?: Yes  Make your own meals?: Yes    Do your own shopping?: Yes    Previous Hospitalizations:   Any hospitalizations or ED visits within the last 12 months?: Yes    How many hospitalizations have you had in the last year?: 1-2    Advance Care Planning:   Living will: No    Durable POA for healthcare: No    Advanced directive: No    Advanced directive counseling given: Yes    Five wishes given: Yes    Patient declined ACP directive: No    End of Life Decisions reviewed with patient: Yes    Provider agrees with end of life decisions: Yes      Cognitive Screening:   Provider or family/friend/caregiver concerned regarding cognition?: No    PREVENTIVE SCREENINGS      Cardiovascular Screening:    General: Screening Current "Due for: Lipid Panel      Diabetes Screening:     General: Screening Current    Due for: Blood Glucose      Colorectal Cancer Screening:     General: Screening Current    Due for: FOBT/FIT      Prostate Cancer Screening:    General: Screening Current      Osteoporosis Screening:    General: Risks and Benefits Discussed    Due for: DXA Axial      Abdominal Aortic Aneurysm (AAA) Screening:        General: Screening Not Indicated      Lung Cancer Screening:     General: Screening Not Indicated      Hepatitis C Screening:    General: Screening Current    Hep C Screening Accepted: Yes      Screening, Brief Intervention, and Referral to Treatment (SBIRT)    Screening  Typical number of drinks in a day: 0  Typical number of drinks in a week: 0  Interpretation: Low risk drinking behavior  Single Item Drug Screening:  How often have you used an illegal drug (including marijuana) or a prescription medication for non-medical reasons in the past year? never    Single Item Drug Screen Score: 0  Interpretation: Negative screen for possible drug use disorder    Other Counseling Topics:   Alcohol use counseling, car/seat belt/driving safety, skin self-exam, sunscreen and calcium and vitamin D intake and regular weightbearing exercise  No results found  Physical Exam:     /70 (BP Location: Left arm, Patient Position: Sitting, Cuff Size: Standard)   Pulse 84   Temp 98 °F (36 7 °C) (Tympanic)   Resp 20   Ht 5' 7\" (1 702 m)   Wt 75 3 kg (166 lb)   SpO2 98%   BMI 26 00 kg/m²     Physical Exam  Vitals and nursing note reviewed  Constitutional:       General: He is not in acute distress  Appearance: He is well-developed  HENT:      Head: Normocephalic and atraumatic  Eyes:      Conjunctiva/sclera: Conjunctivae normal    Cardiovascular:      Rate and Rhythm: Normal rate and regular rhythm  Heart sounds: No murmur heard  Pulmonary:      Effort: Pulmonary effort is normal  No respiratory distress        " Breath sounds: Normal breath sounds  Abdominal:      Palpations: Abdomen is soft  Tenderness: There is no abdominal tenderness  Genitourinary:     Prostate: Normal    Musculoskeletal:         General: No swelling  Cervical back: Neck supple  Skin:     General: Skin is warm and dry  Capillary Refill: Capillary refill takes less than 2 seconds  Comments: onychomycosis   Neurological:      Mental Status: He is alert     Psychiatric:         Mood and Affect: Mood normal           Thereasa Pallas, MD

## 2023-05-31 NOTE — PATIENT INSTRUCTIONS
Medicare Preventive Visit Patient Instructions  Thank you for completing your Welcome to Medicare Visit or Medicare Annual Wellness Visit today  Your next wellness visit will be due in one year (5/31/2024)  The screening/preventive services that you may require over the next 5-10 years are detailed below  Some tests may not apply to you based off risk factors and/or age  Screening tests ordered at today's visit but not completed yet may show as past due  Also, please note that scanned in results may not display below  Preventive Screenings:  Service Recommendations Previous Testing/Comments   Colorectal Cancer Screening  · Colonoscopy    · Fecal Occult Blood Test (FOBT)/Fecal Immunochemical Test (FIT)  · Fecal DNA/Cologuard Test  · Flexible Sigmoidoscopy Age: 39-70 years old   Colonoscopy: every 10 years (May be performed more frequently if at higher risk)  OR  FOBT/FIT: every 1 year  OR  Cologuard: every 3 years  OR  Sigmoidoscopy: every 5 years  Screening may be recommended earlier than age 39 if at higher risk for colorectal cancer  Also, an individualized decision between you and your healthcare provider will decide whether screening between the ages of 74-80 would be appropriate   Colonoscopy: 07/26/2021  FOBT/FIT: Not on file  Cologuard: Not on file  Sigmoidoscopy: Not on file    Screening Current     Prostate Cancer Screening Individualized decision between patient and health care provider in men between ages of 53-78   Medicare will cover every 12 months beginning on the day after your 50th birthday PSA: 0 5 ng/mL     Screening Current     Hepatitis C Screening Once for adults born between 1945 and 1965  More frequently in patients at high risk for Hepatitis C Hep C Antibody: 10/19/2022    Screening Current   Diabetes Screening 1-2 times per year if you're at risk for diabetes or have pre-diabetes Fasting glucose: 94 mg/dL (2/4/2021)  A1C: 5 3 % (2/25/2022)  Screening Current   Cholesterol Screening Once every 5 years if you don't have a lipid disorder  May order more often based on risk factors  Lipid panel: 12/09/2021  Screening Current      Other Preventive Screenings Covered by Medicare:  1  Abdominal Aortic Aneurysm (AAA) Screening: covered once if your at risk  You're considered to be at risk if you have a family history of AAA or a male between the age of 73-68 who smoking at least 100 cigarettes in your lifetime  2  Lung Cancer Screening: covers low dose CT scan once per year if you meet all of the following conditions: (1) Age 50-69; (2) No signs or symptoms of lung cancer; (3) Current smoker or have quit smoking within the last 15 years; (4) You have a tobacco smoking history of at least 20 pack years (packs per day x number of years you smoked); (5) You get a written order from a healthcare provider  3  Glaucoma Screening: covered annually if you're considered high risk: (1) You have diabetes OR (2) Family history of glaucoma OR (3)  aged 48 and older OR (3)  American aged 72 and older  3  Osteoporosis Screening: covered every 2 years if you meet one of the following conditions: (1) Have a vertebral abnormality; (2) On glucocorticoid therapy for more than 3 months; (3) Have primary hyperparathyroidism; (4) On osteoporosis medications and need to assess response to drug therapy  5  HIV Screening: covered annually if you're between the age of 12-76  Also covered annually if you are younger than 13 and older than 72 with risk factors for HIV infection  For pregnant patients, it is covered up to 3 times per pregnancy      Immunizations:  Immunization Recommendations   Influenza Vaccine Annual influenza vaccination during flu season is recommended for all persons aged >= 6 months who do not have contraindications   Pneumococcal Vaccine   * Pneumococcal conjugate vaccine = PCV13 (Prevnar 13), PCV15 (Vaxneuvance), PCV20 (Prevnar 20)  * Pneumococcal polysaccharide vaccine = PPSV23 (Pneumovax) Adults 2364 years old: 1-3 doses may be recommended based on certain risk factors  Adults 72 years old: 1-2 doses may be recommended based off what pneumonia vaccine you previously received   Hepatitis B Vaccine 3 dose series if at intermediate or high risk (ex: diabetes, end stage renal disease, liver disease)   Tetanus (Td) Vaccine - COST NOT COVERED BY MEDICARE PART B Following completion of primary series, a booster dose should be given every 10 years to maintain immunity against tetanus  Td may also be given as tetanus wound prophylaxis  Tdap Vaccine - COST NOT COVERED BY MEDICARE PART B Recommended at least once for all adults  For pregnant patients, recommended with each pregnancy  Shingles Vaccine (Shingrix) - COST NOT COVERED BY MEDICARE PART B  2 shot series recommended in those aged 48 and above     Health Maintenance Due:      Topic Date Due   • Colorectal Cancer Screening  07/25/2026   • HIV Screening  Completed   • Hepatitis C Screening  Completed     Immunizations Due:      Topic Date Due   • COVID-19 Vaccine (3 - Roxane Krabbe series) 08/03/2021     Advance Directives   What are advance directives? Advance directives are legal documents that state your wishes and plans for medical care  These plans are made ahead of time in case you lose your ability to make decisions for yourself  Advance directives can apply to any medical decision, such as the treatments you want, and if you want to donate organs  What are the types of advance directives? There are many types of advance directives, and each state has rules about how to use them  You may choose a combination of any of the following:  · Living will: This is a written record of the treatment you want  You can also choose which treatments you do not want, which to limit, and which to stop at a certain time  This includes surgery, medicine, IV fluid, and tube feedings  · Durable power of  for healthcare [facility]):   This is a written record that states who you want to make healthcare choices for you when you are unable to make them for yourself  This person, called a proxy, is usually a family member or a friend  You may choose more than 1 proxy  · Do not resuscitate (DNR) order:  A DNR order is used in case your heart stops beating or you stop breathing  It is a request not to have certain forms of treatment, such as CPR  A DNR order may be included in other types of advance directives  · Medical directive: This covers the care that you want if you are in a coma, near death, or unable to make decisions for yourself  You can list the treatments you want for each condition  Treatment may include pain medicine, surgery, blood transfusions, dialysis, IV or tube feedings, and a ventilator (breathing machine)  · Values history: This document has questions about your views, beliefs, and how you feel and think about life  This information can help others choose the care that you would choose  Why are advance directives important? An advance directive helps you control your care  Although spoken wishes may be used, it is better to have your wishes written down  Spoken wishes can be misunderstood, or not followed  Treatments may be given even if you do not want them  An advance directive may make it easier for your family to make difficult choices about your care  Weight Management   Why it is important to manage your weight:  Being overweight increases your risk of health conditions such as heart disease, high blood pressure, type 2 diabetes, and certain types of cancer  It can also increase your risk for osteoarthritis, sleep apnea, and other respiratory problems  Aim for a slow, steady weight loss  Even a small amount of weight loss can lower your risk of health problems  How to lose weight safely:  A safe and healthy way to lose weight is to eat fewer calories and get regular exercise   You can lose up about 1 pound a week by decreasing the number of calories you eat by 500 calories each day  Healthy meal plan for weight management:  A healthy meal plan includes a variety of foods, contains fewer calories, and helps you stay healthy  A healthy meal plan includes the following:  · Eat whole-grain foods more often  A healthy meal plan should contain fiber  Fiber is the part of grains, fruits, and vegetables that is not broken down by your body  Whole-grain foods are healthy and provide extra fiber in your diet  Some examples of whole-grain foods are whole-wheat breads and pastas, oatmeal, brown rice, and bulgur  · Eat a variety of vegetables every day  Include dark, leafy greens such as spinach, kale, asha greens, and mustard greens  Eat yellow and orange vegetables such as carrots, sweet potatoes, and winter squash  · Eat a variety of fruits every day  Choose fresh or canned fruit (canned in its own juice or light syrup) instead of juice  Fruit juice has very little or no fiber  · Eat low-fat dairy foods  Drink fat-free (skim) milk or 1% milk  Eat fat-free yogurt and low-fat cottage cheese  Try low-fat cheeses such as mozzarella and other reduced-fat cheeses  · Choose meat and other protein foods that are low in fat  Choose beans or other legumes such as split peas or lentils  Choose fish, skinless poultry (chicken or turkey), or lean cuts of red meat (beef or pork)  Before you cook meat or poultry, cut off any visible fat  · Use less fat and oil  Try baking foods instead of frying them  Add less fat, such as margarine, sour cream, regular salad dressing and mayonnaise to foods  Eat fewer high-fat foods  Some examples of high-fat foods include french fries, doughnuts, ice cream, and cakes  · Eat fewer sweets  Limit foods and drinks that are high in sugar  This includes candy, cookies, regular soda, and sweetened drinks  Exercise:  Exercise at least 30 minutes per day on most days of the week   Some examples of exercise include walking, biking, dancing, and swimming  You can also fit in more physical activity by taking the stairs instead of the elevator or parking farther away from stores  Ask your healthcare provider about the best exercise plan for you  © Copyright [employer] 2018 Information is for End User's use only and may not be sold, redistributed or otherwise used for commercial purposes   All illustrations and images included in CareNotes® are the copyrighted property of A D A M , Inc  or [address] [place] [address]

## 2023-06-08 ENCOUNTER — TELEPHONE (OUTPATIENT)
Dept: PSYCHIATRY | Facility: CLINIC | Age: 53
End: 2023-06-08

## 2023-06-08 NOTE — TELEPHONE ENCOUNTER
Patient has been added to the Medication Management and Talk Therapy wait list without a referral     Insurance: [employer]  Insurance Type:    Commercial []   Medicaid [x]   [place] (if applicable)   Medicare []  Location Preference: [facility] loc  Provider Preference: pref male prov  Virtual: Yes [] No [x]

## 2023-06-27 DIAGNOSIS — I10 ESSENTIAL HYPERTENSION, BENIGN: ICD-10-CM

## 2023-06-27 RX ORDER — AMLODIPINE BESYLATE 5 MG/1
TABLET ORAL
Qty: 30 TABLET | Refills: 3 | Status: SHIPPED | OUTPATIENT
Start: 2023-06-27

## 2023-07-30 PROBLEM — Z00.00 MEDICARE ANNUAL WELLNESS VISIT, SUBSEQUENT: Status: RESOLVED | Noted: 2020-04-28 | Resolved: 2023-07-30

## 2023-09-12 DIAGNOSIS — E03.8 OTHER SPECIFIED HYPOTHYROIDISM: ICD-10-CM

## 2023-09-12 RX ORDER — LEVOTHYROXINE SODIUM 0.07 MG/1
75 TABLET ORAL DAILY
Qty: 30 TABLET | Refills: 3 | Status: SHIPPED | OUTPATIENT
Start: 2023-09-12

## 2023-11-28 ENCOUNTER — OFFICE VISIT (OUTPATIENT)
Dept: FAMILY MEDICINE CLINIC | Facility: CLINIC | Age: 53
End: 2023-11-28
Payer: MEDICARE

## 2023-11-28 VITALS
DIASTOLIC BLOOD PRESSURE: 70 MMHG | WEIGHT: 170 LBS | OXYGEN SATURATION: 98 % | BODY MASS INDEX: 26.68 KG/M2 | SYSTOLIC BLOOD PRESSURE: 120 MMHG | HEIGHT: 67 IN | HEART RATE: 99 BPM | TEMPERATURE: 98 F | RESPIRATION RATE: 16 BRPM

## 2023-11-28 DIAGNOSIS — E03.9 ACQUIRED HYPOTHYROIDISM: Primary | ICD-10-CM

## 2023-11-28 DIAGNOSIS — F41.8 DEPRESSION WITH ANXIETY: ICD-10-CM

## 2023-11-28 DIAGNOSIS — I10 ESSENTIAL HYPERTENSION, BENIGN: ICD-10-CM

## 2023-11-28 PROCEDURE — 99214 OFFICE O/P EST MOD 30 MIN: CPT | Performed by: FAMILY MEDICINE

## 2023-11-28 NOTE — PROGRESS NOTES
Nickolas Part      : 1970      MRN: [id number]  Encounter Provider: Sheree Borrego MD  Encounter Date: 2023   Encounter department: [address] [place] [address]     1. Acquired hypothyroidism    2. Essential hypertension, benign    3. Depression with anxiety      Chief Complaint  Patient presents for follow-up of hypertension, hypothyroidism, and depression. History of Present Illness  53-year-old male with a history of hypertension, hypothyroidism, and depression. Reports feeling well overall but has not completed requested lab work. Expresses concern about managing medications and the recent loss of his spouse. Medications  Levothyroxine 75 µg daily  Amlodipine 5 mg daily  Citalopram 20 mg daily    Allergies  No known drug allergies. Past Medical History  Hypertension  Hypothyroidism  Depression  Negative stress test in January    Past Surgical History  No surgical history reported. Social History  Works part-time   with children      Review of Systems  Denies any chest pain or palpitations. Reports occasional hand numbness during sleep. Vital Signs  /70 (BP Location: Left arm, Patient Position: Sitting, Cuff Size: Standard)   Pulse 99   Temp 98 °F (36.7 °C) (Tympanic)   Resp 16   Ht 5' 7" (1.702 m)   Wt 77.1 kg (170 lb)   SpO2 98%   BMI 26.63 kg/m²       Physical Exam  he looks in non distress. Oriented, Lungs are clear. Heart is having Normal rhythm w/o murmurs. Abdomen is protuberant/obese. Neurological system has no deficit and walks normal.  .    Lab Results  Pending - patient has not yet completed lab work. Imaging and other Relevant Results  Stress test in January was negative for cardiac impact. Assessment and Plan  Hypertension: Blood pressure is controlled. Continue current medication regimen. Encourage patient to monitor blood pressure at home.   Hypothyroidism: Continue Levothyroxine 75 µg daily. Labs needed to assess thyroid function. Depression: Continue Citalopram 20 mg daily. Patient is on the waiting list to see a psychiatrist. Encourage patient to seek immediate help if feeling overwhelmed. Labs: Strongly encourage patient to complete blood work to monitor health status. Follow-up: Schedule a return visit in four months to re-evaluate treatment efficacy and lab results. May contact patient by phone if necessary.       MD Killian Rowland S Karsten Mathis

## 2023-12-12 ENCOUNTER — LAB (OUTPATIENT)
Dept: LAB | Facility: HOSPITAL | Age: 53
End: 2023-12-12
Payer: MEDICARE

## 2023-12-12 DIAGNOSIS — I10 ESSENTIAL HYPERTENSION, BENIGN: ICD-10-CM

## 2023-12-12 DIAGNOSIS — E03.9 ACQUIRED HYPOTHYROIDISM: ICD-10-CM

## 2023-12-12 LAB
ALBUMIN SERPL BCP-MCNC: 4.6 G/DL (ref 3.5–5)
ALP SERPL-CCNC: 82 U/L (ref 34–104)
ALT SERPL W P-5'-P-CCNC: 15 U/L (ref 7–52)
ANION GAP SERPL CALCULATED.3IONS-SCNC: 8 MMOL/L
AST SERPL W P-5'-P-CCNC: 16 U/L (ref 13–39)
BILIRUB SERPL-MCNC: 0.59 MG/DL (ref 0.2–1)
BUN SERPL-MCNC: 15 MG/DL (ref 5–25)
CALCIUM SERPL-MCNC: 9.4 MG/DL (ref 8.4–10.2)
CHLORIDE SERPL-SCNC: 102 MMOL/L (ref 96–108)
CHOLEST SERPL-MCNC: 191 MG/DL
CO2 SERPL-SCNC: 26 MMOL/L (ref 21–32)
CREAT SERPL-MCNC: 0.74 MG/DL (ref 0.6–1.3)
GFR SERPL CREATININE-BSD FRML MDRD: 104 ML/MIN/1.73SQ M
GLUCOSE P FAST SERPL-MCNC: 90 MG/DL (ref 65–99)
HDLC SERPL-MCNC: 67 MG/DL
LDLC SERPL CALC-MCNC: 106 MG/DL (ref 0–100)
NONHDLC SERPL-MCNC: 124 MG/DL
POTASSIUM SERPL-SCNC: 4 MMOL/L (ref 3.5–5.3)
PROT SERPL-MCNC: 7.6 G/DL (ref 6.4–8.4)
SODIUM SERPL-SCNC: 136 MMOL/L (ref 135–147)
TRIGL SERPL-MCNC: 90 MG/DL
TSH SERPL DL<=0.05 MIU/L-ACNC: 1.57 UIU/ML (ref 0.45–4.5)

## 2023-12-12 PROCEDURE — 36415 COLL VENOUS BLD VENIPUNCTURE: CPT

## 2023-12-12 PROCEDURE — 80053 COMPREHEN METABOLIC PANEL: CPT

## 2023-12-12 PROCEDURE — 84443 ASSAY THYROID STIM HORMONE: CPT

## 2023-12-12 PROCEDURE — 80061 LIPID PANEL: CPT

## 2023-12-17 NOTE — RESULT ENCOUNTER NOTE
Estimado Fabrizio Imelda examenes estan en limites normales. Espero maik sallie. Si necesita ayuda adicional por favor comuniquese conmigo a travez de la aplicacion MyChart. Yoni.  Dr Stan Bagley

## 2023-12-19 DIAGNOSIS — E03.8 OTHER SPECIFIED HYPOTHYROIDISM: ICD-10-CM

## 2023-12-19 RX ORDER — LEVOTHYROXINE SODIUM 0.07 MG/1
75 TABLET ORAL DAILY
Qty: 30 TABLET | Refills: 2 | Status: SHIPPED | OUTPATIENT
Start: 2023-12-19

## 2024-01-29 DIAGNOSIS — I10 ESSENTIAL HYPERTENSION, BENIGN: ICD-10-CM

## 2024-01-29 RX ORDER — AMLODIPINE BESYLATE 5 MG/1
TABLET ORAL
Qty: 30 TABLET | Refills: 2 | Status: SHIPPED | OUTPATIENT
Start: 2024-01-29

## 2024-02-19 DIAGNOSIS — E03.8 OTHER SPECIFIED HYPOTHYROIDISM: ICD-10-CM

## 2024-02-19 RX ORDER — LEVOTHYROXINE SODIUM 0.07 MG/1
75 TABLET ORAL EVERY MORNING
Qty: 30 TABLET | Refills: 1 | Status: SHIPPED | OUTPATIENT
Start: 2024-02-19

## 2024-03-07 NOTE — ASSESSMENT & PLAN NOTE
-Pt symptoms controlled at this time without use of Celexa  Patient denies suicidal/homicidal ideations  Offered referral to psych but at this time he refused  Please see the attached refill request.

## 2024-04-08 DIAGNOSIS — E03.8 OTHER SPECIFIED HYPOTHYROIDISM: ICD-10-CM

## 2024-04-08 DIAGNOSIS — I10 ESSENTIAL HYPERTENSION, BENIGN: ICD-10-CM

## 2024-04-09 RX ORDER — AMLODIPINE BESYLATE 5 MG/1
TABLET ORAL
Qty: 30 TABLET | Refills: 5 | Status: SHIPPED | OUTPATIENT
Start: 2024-04-09

## 2024-04-09 RX ORDER — LEVOTHYROXINE SODIUM 0.07 MG/1
75 TABLET ORAL EVERY MORNING
Qty: 30 TABLET | Refills: 5 | Status: SHIPPED | OUTPATIENT
Start: 2024-04-09

## 2024-05-16 ENCOUNTER — TELEPHONE (OUTPATIENT)
Dept: PSYCHIATRY | Facility: CLINIC | Age: 54
End: 2024-05-16

## 2024-08-02 ENCOUNTER — OFFICE VISIT (OUTPATIENT)
Dept: FAMILY MEDICINE CLINIC | Facility: CLINIC | Age: 54
End: 2024-08-02
Payer: MEDICARE

## 2024-08-02 VITALS
BODY MASS INDEX: 27 KG/M2 | HEART RATE: 116 BPM | WEIGHT: 172 LBS | OXYGEN SATURATION: 98 % | SYSTOLIC BLOOD PRESSURE: 110 MMHG | TEMPERATURE: 98 F | RESPIRATION RATE: 16 BRPM | HEIGHT: 67 IN | DIASTOLIC BLOOD PRESSURE: 80 MMHG

## 2024-08-02 DIAGNOSIS — E03.9 ACQUIRED HYPOTHYROIDISM: ICD-10-CM

## 2024-08-02 DIAGNOSIS — Z23 NEED FOR SHINGLES VACCINE: ICD-10-CM

## 2024-08-02 DIAGNOSIS — Z23 NEED FOR TDAP VACCINATION: ICD-10-CM

## 2024-08-02 DIAGNOSIS — R07.89 OTHER CHEST PAIN: ICD-10-CM

## 2024-08-02 DIAGNOSIS — Z00.00 MEDICARE ANNUAL WELLNESS VISIT, SUBSEQUENT: Primary | ICD-10-CM

## 2024-08-02 DIAGNOSIS — R00.0 TACHYCARDIA: ICD-10-CM

## 2024-08-02 PROCEDURE — G0439 PPPS, SUBSEQ VISIT: HCPCS | Performed by: FAMILY MEDICINE

## 2024-08-02 PROCEDURE — 99214 OFFICE O/P EST MOD 30 MIN: CPT | Performed by: FAMILY MEDICINE

## 2024-08-02 PROCEDURE — 93000 ELECTROCARDIOGRAM COMPLETE: CPT | Performed by: FAMILY MEDICINE

## 2024-08-02 RX ORDER — ESCITALOPRAM OXALATE 10 MG/1
TABLET ORAL
COMMUNITY
Start: 2024-07-16 | End: 2024-08-02 | Stop reason: ALTCHOICE

## 2024-08-02 RX ORDER — METOPROLOL SUCCINATE 25 MG/1
12.5 TABLET, EXTENDED RELEASE ORAL DAILY
Qty: 15 TABLET | Refills: 5 | Status: SHIPPED | OUTPATIENT
Start: 2024-08-02

## 2024-08-02 RX ORDER — ZOSTER VACCINE RECOMBINANT, ADJUVANTED 50 MCG/0.5
0.5 KIT INTRAMUSCULAR ONCE
Qty: 1 EACH | Refills: 1 | Status: SHIPPED | OUTPATIENT
Start: 2024-08-02 | End: 2024-08-02

## 2024-08-02 NOTE — PROGRESS NOTES
Name: Fabrizio Owusu      : 1970      MRN: [id number]  Encounter Provider: Toñito Rodriguez MD  Encounter Date: 2024   Encounter department: [facility]    Subjective   Left Side Chest Pain    Chief Complaint:  Left side chest pain    History of Present Illness:  The patient, Fercho, presents with left side chest pain. A stress nuclear study conducted on 2024, showed no evidence of ischemia. The pain is suspected to be muscular in origin. The patient reports a history of tachycardia and is currently experiencing a heart rate of 116 bpm. Blood pressure was noted to be 110 mmHg, which is slightly low. The patient is not currently taking psychiatric medications. The patient has been advised to take half a tablet of Metoprolol Succinate 12.5 mg daily for tachycardia. Thyroid function tests are recommended to be checked today. The patient continues on Levothyroxine 75 mcg for thyroid management. The patient received the TDAP vaccine today and has been advised to get the shingles vaccine at the pharmacy.      Current Outpatient Medications:     levothyroxine 75 mcg tablet, TAKE ONE TABLET BY MOUTH ONCE DAILY IN THE MORNING, Disp: 30 tablet, Rfl: 5    metoprolol succinate (TOPROL-XL) 25 mg 24 hr tablet, Take 0.5 tablets (12.5 mg total) by mouth daily, Disp: 15 tablet, Rfl: 5    tetanus-diphtheria-acellular pertussis (ADACEL) 5-2-15.5 LF-mcg/0.5 injection, Inject 0.5 mL into a muscle once for 1 dose, Disp: 0.5 mL, Rfl: 0    Zoster Vac Recomb Adjuvanted (Shingrix) 50 MCG/0.5ML SUSR, Inject 0.5 mL into a muscle once for 1 dose Repeat dose in 2 to 6 months, Disp: 1 each, Rfl: 1    Artificial Tears 1.4 % ophthalmic solution, , Disp: , Rfl:       No Known Allergies      Past Surgical History:   Procedure Laterality Date    COLONOSCOPY      COLONOSCOPY W/ POLYPECTOMY  2021    HERNIA REPAIR           Review of Systems:  No swelling of the feet  Lungs are clear  Heart  "is normal except for tachycardia    Vital Signs:  /80 (BP Location: Left arm, Patient Position: Sitting, Cuff Size: Standard)   Pulse (!) 116   Temp 98 °F (36.7 °C) (Tympanic)   Resp 16   Ht 5' 7\" (1.702 m)   Wt 78 kg (172 lb)   SpO2 98%   BMI 26.94 kg/m²       Physical Exam:  General examination is normal  Prostate is normal  No swelling of the feet  Lungs are clear  Heart is normal except for tachycardia  Prostate normal.  Imaging and Other Relevant Results:  Stress nuclear study on 01/05/2024: No evidence of ischemia    Assessment and Plan:  1. Left side chest pain: Likely muscular in origin. Continue monitoring and follow up in six months.  2. Tachycardia: Start Metoprolol Succinate 12.5 mg, half tablet daily. Reassess heart rate and symptoms in follow-up.  3. Hypothyroidism: Continue Levothyroxine 75 mcg daily. Check thyroid function tests today.  4. Preventive care: Administered TDAP vaccine today. Advised to get shingles vaccine at the pharmacy.        Toñito Rodriguez MD   [facility] CARE [facility]    "

## 2024-08-02 NOTE — PROGRESS NOTES
Ambulatory Visit  Name: Fabrizio Owusu      : 1970      MRN: [id number]  Encounter Provider: Toñito Rodriguez MD  Encounter Date: 2024   Encounter department: [facility] CARE [facility]    Assessment & Plan   1. Medicare annual wellness visit, subsequent  -     CBC and differential; Future  -     Comprehensive metabolic panel; Future  2. Acquired hypothyroidism  -     TSH, 3rd generation; Future  3. Need for Tdap vaccination  -     tetanus-diphtheria-acellular pertussis (ADACEL) 5-2-15.5 LF-mcg/0.5 injection; Inject 0.5 mL into a muscle once for 1 dose  4. Need for shingles vaccine  -     Zoster Vac Recomb Adjuvanted (Shingrix) 50 MCG/0.5ML SUSR; Inject 0.5 mL into a muscle once for 1 dose Repeat dose in 2 to 6 months  5. Tachycardia  -     POCT ECG  -     metoprolol succinate (TOPROL-XL) 25 mg 24 hr tablet; Take 0.5 tablets (12.5 mg total) by mouth daily  6. Mood disorder with psychosis (HCC)       Preventive health issues were discussed with patient, and age appropriate screening tests were ordered as noted in patient's After Visit Summary. Personalized health advice and appropriate referrals for health education or preventive services given if needed, as noted in patient's After Visit Summary.    History of Present Illness     HPI Patient is here for PE, not having any acute distress.   Off and on Left sided chest pain. 2024 Nuclear stress test was negative for ischemia.  Patient Care Team:  Toñito Rodriguez MD as PCP - General (Family Medicine)    Review of Systems  Medical History Reviewed by provider this encounter:       Annual Wellness Visit Questionnaire   Last Medicare Wellness visit information reviewed, patient interviewed and updates made to the record today.      Health Risk Assessment:   Patient rates overall health as good. Patient feels that their physical health rating is same. Patient is satisfied with their life. Eyesight was rated as same. Hearing was rated  as same. Patient feels that their emotional and mental health rating is same. Patients states they are never, rarely angry. Patient states they are sometimes unusually tired/fatigued. Pain experienced in the last 7 days has been some. Patient's pain rating has been 3/10. Patient states that he has experienced no weight loss or gain in last 6 months.     Depression Screening:   PHQ-9 Score: 1      Fall Risk Screening:   In the past year, patient has experienced: no history of falling in past year      Urinary Incontinence Screening:   Patient has not leaked urine accidently in the last six months.     Home Safety:  Patient does not have trouble with stairs inside or outside of their home. Patient has working smoke alarms and has working carbon monoxide detector. Home safety hazards include: none.     Nutrition:   Current diet is Regular.     Medications:   Patient is currently taking over-the-counter supplements. OTC medications include: see medication list. Patient is able to manage medications.     Activities of Daily Living (ADLs)/Instrumental Activities of Daily Living (IADLs):   Walk and transfer into and out of bed and chair?: Yes  Dress and groom yourself?: Yes    Bathe or shower yourself?: Yes    Feed yourself? Yes  Do your laundry/housekeeping?: Yes  Manage your money, pay your bills and track your expenses?: Yes  Make your own meals?: Yes    Do your own shopping?: Yes    Previous Hospitalizations:   Any hospitalizations or ED visits within the last 12 months?: Yes    How many hospitalizations have you had in the last year?: 1-2    Advance Care Planning:   Living will: No    Durable POA for healthcare: No    Advanced directive: No    Advanced directive counseling given: Yes    Five wishes given: No    Patient declined ACP directive: No    End of Life Decisions reviewed with patient: Yes    Provider agrees with end of life decisions: Yes      Cognitive Screening:   Provider or family/friend/caregiver  concerned regarding cognition?: No    PREVENTIVE SCREENINGS      Cardiovascular Screening:    General: Screening Current    Due for: Lipid Panel      Diabetes Screening:     General: Screening Current    Due for: Blood Glucose      Colorectal Cancer Screening:     General: Screening Current    Due for: FOBT/FIT      Prostate Cancer Screening:    General: Screening Current      Breast Cancer Screening:     General: Screening Not Indicated      Cervical Cancer Screening:    General: Screening Not Indicated      Osteoporosis Screening:    General: Risks and Benefits Discussed    Due for: DXA Axial      Abdominal Aortic Aneurysm (AAA) Screening:        General: Screening Not Indicated      Lung Cancer Screening:     General: Screening Not Indicated      Hepatitis C Screening:    General: Screening Current    Hep C Screening Accepted: Yes      Screening, Brief Intervention, and Referral to Treatment (SBIRT)    Screening  Typical number of drinks in a day: 0  Typical number of drinks in a week: 0  Interpretation: Low risk drinking behavior.    Single Item Drug Screening:  How often have you used an illegal drug (including marijuana) or a prescription medication for non-medical reasons in the past year? never    Single Item Drug Screen Score: 0  Interpretation: Negative screen for possible drug use disorder    Other Counseling Topics:   Alcohol use counseling, car/seat belt/driving safety, skin self-exam, sunscreen and calcium and vitamin D intake and regular weightbearing exercise.     Social Determinants of Health     Financial Resource Strain: Low Risk  (5/31/2023)    Overall Financial Resource Strain (CARDIA)     Difficulty of Paying Living Expenses: Not hard at all   Food Insecurity: No Food Insecurity (8/2/2024)    Hunger Vital Sign     Worried About Running Out of Food in the Last Year: Never true     Ran Out of Food in the Last Year: Never true   Transportation Needs: No Transportation Needs (8/2/2024)    PRAPARE -  "Transportation     Lack of Transportation (Medical): No     Lack of Transportation (Non-Medical): No   Housing Stability: Low Risk  (8/2/2024)    Housing Stability Vital Sign     Unable to Pay for Housing in the Last Year: No     Number of Times Moved in the Last Year: 1     Homeless in the Last Year: No   Utilities: Not At Risk (8/2/2024)    [facility] Utilities     Threatened with loss of utilities: No     No results found.    Objective     /80 (BP Location: Left arm, Patient Position: Sitting, Cuff Size: Standard)   Pulse (!) 116   Temp 98 °F (36.7 °C) (Tympanic)   Resp 16   Ht 5' 7\" (1.702 m)   Wt 78 kg (172 lb)   SpO2 98%   BMI 26.94 kg/m²     Non distress, normal respiratory effort. Pulse is regular. Heart without murmurs.   Prostate normal size.  .    "

## 2024-08-02 NOTE — PATIENT INSTRUCTIONS
Medicare Preventive Visit Patient Instructions  Thank you for completing your Welcome to Medicare Visit or Medicare Annual Wellness Visit today. Your next wellness visit will be due in one year (8/3/2025).  The screening/preventive services that you may require over the next 5-10 years are detailed below. Some tests may not apply to you based off risk factors and/or age. Screening tests ordered at today's visit but not completed yet may show as past due. Also, please note that scanned in results may not display below.  Preventive Screenings:  Service Recommendations Previous Testing/Comments   Colorectal Cancer Screening  Colonoscopy    Fecal Occult Blood Test (FOBT)/Fecal Immunochemical Test (FIT)  Fecal DNA/Cologuard Test  Flexible Sigmoidoscopy Age: 45-75 years old   Colonoscopy: every 10 years (May be performed more frequently if at higher risk)  OR  FOBT/FIT: every 1 year  OR  Cologuard: every 3 years  OR  Sigmoidoscopy: every 5 years  Screening may be recommended earlier than age 45 if at higher risk for colorectal cancer. Also, an individualized decision between you and your healthcare provider will decide whether screening between the ages of 76-85 would be appropriate. Colonoscopy: 07/26/2021  FOBT/FIT: Not on file  Cologuard: Not on file  Sigmoidoscopy: Not on file    Screening Current     Prostate Cancer Screening Individualized decision between patient and health care provider in men between ages of 55-69   Medicare will cover every 12 months beginning on the day after your 50th birthday PSA: 0.5 ng/mL           Hepatitis C Screening Once for adults born between 1945 and 1965  More frequently in patients at high risk for Hepatitis C Hep C Antibody: 10/19/2022    Screening Current   Diabetes Screening 1-2 times per year if you're at risk for diabetes or have pre-diabetes Fasting glucose: 90 mg/dL (12/12/2023)  A1C: 5.3 % (2/25/2022)  Screening Current   Cholesterol Screening Once every 5 years if you  don't have a lipid disorder. May order more often based on risk factors. Lipid panel: 12/12/2023  Screening Current      Other Preventive Screenings Covered by Medicare:  Abdominal Aortic Aneurysm (AAA) Screening: covered once if your at risk. You're considered to be at risk if you have a family history of AAA or a male between the age of 65-75 who smoking at least 100 cigarettes in your lifetime.  Lung Cancer Screening: covers low dose CT scan once per year if you meet all of the following conditions: (1) Age 55-77; (2) No signs or symptoms of lung cancer; (3) Current smoker or have quit smoking within the last 15 years; (4) You have a tobacco smoking history of at least 20 pack years (packs per day x number of years you smoked); (5) You get a written order from a healthcare provider.  Glaucoma Screening: covered annually if you're considered high risk: (1) You have diabetes OR (2) Family history of glaucoma OR (3)  aged 50 and older OR (4)  American aged 65 and older  Osteoporosis Screening: covered every 2 years if you meet one of the following conditions: (1) Have a vertebral abnormality; (2) On glucocorticoid therapy for more than 3 months; (3) Have primary hyperparathyroidism; (4) On osteoporosis medications and need to assess response to drug therapy.  HIV Screening: covered annually if you're between the age of 15-65. Also covered annually if you are younger than 15 and older than 65 with risk factors for HIV infection. For pregnant patients, it is covered up to 3 times per pregnancy.    Immunizations:  Immunization Recommendations   Influenza Vaccine Annual influenza vaccination during flu season is recommended for all persons aged >= 6 months who do not have contraindications   Pneumococcal Vaccine   * Pneumococcal conjugate vaccine = PCV13 (Prevnar 13), PCV15 (Vaxneuvance), PCV20 (Prevnar 20)  * Pneumococcal polysaccharide vaccine = PPSV23 (Pneumovax) Adults 19-63 yo with certain  risk factors or if 65+ yo  If never received any pneumonia vaccine: recommend Prevnar 20 (PCV20)  Give PCV20 if previously received 1 dose of PCV13 or PPSV23   Hepatitis B Vaccine 3 dose series if at intermediate or high risk (ex: diabetes, end stage renal disease, liver disease)   Respiratory syncytial virus (RSV) Vaccine - COVERED BY MEDICARE PART D  * RSVPreF3 (Arexvy) CDC recommends that adults 60 years of age and older may receive a single dose of RSV vaccine using shared clinical decision-making (SCDM)   Tetanus (Td) Vaccine - COST NOT COVERED BY MEDICARE PART B Following completion of primary series, a booster dose should be given every 10 years to maintain immunity against tetanus. Td may also be given as tetanus wound prophylaxis.   Tdap Vaccine - COST NOT COVERED BY MEDICARE PART B Recommended at least once for all adults. For pregnant patients, recommended with each pregnancy.   Shingles Vaccine (Shingrix) - COST NOT COVERED BY MEDICARE PART B  2 shot series recommended in those 19 years and older who have or will have weakened immune systems or those 50 years and older     Health Maintenance Due:      Topic Date Due   • Colorectal Cancer Screening  07/25/2026   • HIV Screening  Completed   • Hepatitis C Screening  Completed     Immunizations Due:      Topic Date Due   • COVID-19 Vaccine (3 - 2023-24 season) 09/01/2023   • Influenza Vaccine (1) 09/01/2024     Advance Directives   What are advance directives?  Advance directives are legal documents that state your wishes and plans for medical care. These plans are made ahead of time in case you lose your ability to make decisions for yourself. Advance directives can apply to any medical decision, such as the treatments you want, and if you want to donate organs.   What are the types of advance directives?  There are many types of advance directives, and each state has rules about how to use them. You may choose a combination of any of the  following:  Living will:  This is a written record of the treatment you want. You can also choose which treatments you do not want, which to limit, and which to stop at a certain time. This includes surgery, medicine, IV fluid, and tube feedings.   Durable power of  for healthcare (DPAHC):  This is a written record that states who you want to make healthcare choices for you when you are unable to make them for yourself. This person, called a proxy, is usually a family member or a friend. You may choose more than 1 proxy.  Do not resuscitate (DNR) order:  A DNR order is used in case your heart stops beating or you stop breathing. It is a request not to have certain forms of treatment, such as CPR. A DNR order may be included in other types of advance directives.  Medical directive:  This covers the care that you want if you are in a coma, near death, or unable to make decisions for yourself. You can list the treatments you want for each condition. Treatment may include pain medicine, surgery, blood transfusions, dialysis, IV or tube feedings, and a ventilator (breathing machine).  Values history:  This document has questions about your views, beliefs, and how you feel and think about life. This information can help others choose the care that you would choose.  Why are advance directives important?  An advance directive helps you control your care. Although spoken wishes may be used, it is better to have your wishes written down. Spoken wishes can be misunderstood, or not followed. Treatments may be given even if you do not want them. An advance directive may make it easier for your family to make difficult choices about your care.   Weight Management   Why it is important to manage your weight:  Being overweight increases your risk of health conditions such as heart disease, high blood pressure, type 2 diabetes, and certain types of cancer. It can also increase your risk for osteoarthritis, sleep apnea, and  other respiratory problems. Aim for a slow, steady weight loss. Even a small amount of weight loss can lower your risk of health problems.  How to lose weight safely:  A safe and healthy way to lose weight is to eat fewer calories and get regular exercise. You can lose up about 1 pound a week by decreasing the number of calories you eat by 500 calories each day.   Healthy meal plan for weight management:  A healthy meal plan includes a variety of foods, contains fewer calories, and helps you stay healthy. A healthy meal plan includes the following:  Eat whole-grain foods more often.  A healthy meal plan should contain fiber. Fiber is the part of grains, fruits, and vegetables that is not broken down by your body. Whole-grain foods are healthy and provide extra fiber in your diet. Some examples of whole-grain foods are whole-wheat breads and pastas, oatmeal, brown rice, and bulgur.  Eat a variety of vegetables every day.  Include dark, leafy greens such as spinach, kale, asha greens, and mustard greens. Eat yellow and orange vegetables such as carrots, sweet potatoes, and winter squash.   Eat a variety of fruits every day.  Choose fresh or canned fruit (canned in its own juice or light syrup) instead of juice. Fruit juice has very little or no fiber.  Eat low-fat dairy foods.  Drink fat-free (skim) milk or 1% milk. Eat fat-free yogurt and low-fat cottage cheese. Try low-fat cheeses such as mozzarella and other reduced-fat cheeses.  Choose meat and other protein foods that are low in fat.  Choose beans or other legumes such as split peas or lentils. Choose fish, skinless poultry (chicken or turkey), or lean cuts of red meat (beef or pork). Before you cook meat or poultry, cut off any visible fat.   Use less fat and oil.  Try baking foods instead of frying them. Add less fat, such as margarine, sour cream, regular salad dressing and mayonnaise to foods. Eat fewer high-fat foods. Some examples of high-fat foods  include french fries, doughnuts, ice cream, and cakes.  Eat fewer sweets.  Limit foods and drinks that are high in sugar. This includes candy, cookies, regular soda, and sweetened drinks.  Exercise:  Exercise at least 30 minutes per day on most days of the week. Some examples of exercise include walking, biking, dancing, and swimming. You can also fit in more physical activity by taking the stairs instead of the elevator or parking farther away from stores. Ask your healthcare provider about the best exercise plan for you.      © Copyright [employer] 2018 Information is for End User's use only and may not be sold, redistributed or otherwise used for commercial purposes. All illustrations and images included in CareNotes® are the copyrighted property of [employer]M., Inc[employer] or [email or web address]

## 2024-08-06 ENCOUNTER — APPOINTMENT (OUTPATIENT)
Dept: LAB | Facility: HOSPITAL | Age: 54
End: 2024-08-06
Payer: MEDICARE

## 2024-08-06 DIAGNOSIS — Z00.00 MEDICARE ANNUAL WELLNESS VISIT, SUBSEQUENT: ICD-10-CM

## 2024-08-06 DIAGNOSIS — E03.9 ACQUIRED HYPOTHYROIDISM: ICD-10-CM

## 2024-08-06 LAB
ALBUMIN SERPL BCG-MCNC: 4.3 G/DL (ref 3.5–5)
ALP SERPL-CCNC: 75 U/L (ref 34–104)
ALT SERPL W P-5'-P-CCNC: 15 U/L (ref 7–52)
ANION GAP SERPL CALCULATED.3IONS-SCNC: 10 MMOL/L (ref 4–13)
AST SERPL W P-5'-P-CCNC: 15 U/L (ref 13–39)
BASOPHILS # BLD AUTO: 0.07 THOUSANDS/ÂΜL (ref 0–0.1)
BASOPHILS NFR BLD AUTO: 1 % (ref 0–1)
BILIRUB SERPL-MCNC: 0.49 MG/DL (ref 0.2–1)
BUN SERPL-MCNC: 14 MG/DL (ref 5–25)
CALCIUM SERPL-MCNC: 9.2 MG/DL (ref 8.4–10.2)
CHLORIDE SERPL-SCNC: 104 MMOL/L (ref 96–108)
CO2 SERPL-SCNC: 25 MMOL/L (ref 21–32)
CREAT SERPL-MCNC: 0.79 MG/DL (ref 0.6–1.3)
EOSINOPHIL # BLD AUTO: 0.15 THOUSAND/ÂΜL (ref 0–0.61)
EOSINOPHIL NFR BLD AUTO: 2 % (ref 0–6)
ERYTHROCYTE [DISTWIDTH] IN BLOOD BY AUTOMATED COUNT: 12.8 % (ref 11.6–15.1)
GFR SERPL CREATININE-BSD FRML MDRD: 101 ML/MIN/1.73SQ M
GLUCOSE P FAST SERPL-MCNC: 93 MG/DL (ref 65–99)
HCT VFR BLD AUTO: 46.2 % (ref 36.5–49.3)
HGB BLD-MCNC: 15.6 G/DL (ref 12–17)
IMM GRANULOCYTES # BLD AUTO: 0.08 THOUSAND/UL (ref 0–0.2)
IMM GRANULOCYTES NFR BLD AUTO: 1 % (ref 0–2)
LYMPHOCYTES # BLD AUTO: 1.61 THOUSANDS/ÂΜL (ref 0.6–4.47)
LYMPHOCYTES NFR BLD AUTO: 18 % (ref 14–44)
MCH RBC QN AUTO: 32 PG (ref 26.8–34.3)
MCHC RBC AUTO-ENTMCNC: 33.8 G/DL (ref 31.4–37.4)
MCV RBC AUTO: 95 FL (ref 82–98)
MONOCYTES # BLD AUTO: 0.73 THOUSAND/ÂΜL (ref 0.17–1.22)
MONOCYTES NFR BLD AUTO: 8 % (ref 4–12)
NEUTROPHILS # BLD AUTO: 6.44 THOUSANDS/ÂΜL (ref 1.85–7.62)
NEUTS SEG NFR BLD AUTO: 70 % (ref 43–75)
NRBC BLD AUTO-RTO: 0 /100 WBCS
PLATELET # BLD AUTO: 311 THOUSANDS/UL (ref 149–390)
PMV BLD AUTO: 10.6 FL (ref 8.9–12.7)
POTASSIUM SERPL-SCNC: 3.8 MMOL/L (ref 3.5–5.3)
PROT SERPL-MCNC: 7.5 G/DL (ref 6.4–8.4)
RBC # BLD AUTO: 4.88 MILLION/UL (ref 3.88–5.62)
SODIUM SERPL-SCNC: 139 MMOL/L (ref 135–147)
TSH SERPL DL<=0.05 MIU/L-ACNC: 1.99 UIU/ML (ref 0.45–4.5)
WBC # BLD AUTO: 9.08 THOUSAND/UL (ref 4.31–10.16)

## 2024-08-06 PROCEDURE — 36415 COLL VENOUS BLD VENIPUNCTURE: CPT

## 2024-08-06 PROCEDURE — 85025 COMPLETE CBC W/AUTO DIFF WBC: CPT

## 2024-08-06 PROCEDURE — 84443 ASSAY THYROID STIM HORMONE: CPT

## 2024-08-06 PROCEDURE — 80053 COMPREHEN METABOLIC PANEL: CPT

## 2024-08-13 ENCOUNTER — TELEPHONE (OUTPATIENT)
Age: 54
End: 2024-08-13

## 2024-11-19 RX ORDER — AMLODIPINE BESYLATE 5 MG/1
TABLET ORAL
COMMUNITY
Start: 2024-08-16 | End: 2024-11-20 | Stop reason: ALTCHOICE

## 2024-11-19 RX ORDER — ESCITALOPRAM OXALATE 10 MG/1
TABLET ORAL
COMMUNITY
Start: 2024-08-12 | End: 2024-11-20 | Stop reason: SDUPTHER

## 2024-11-20 ENCOUNTER — OFFICE VISIT (OUTPATIENT)
Dept: FAMILY MEDICINE CLINIC | Facility: CLINIC | Age: 54
End: 2024-11-20
Payer: MEDICARE

## 2024-11-20 VITALS
TEMPERATURE: 98.2 F | DIASTOLIC BLOOD PRESSURE: 80 MMHG | WEIGHT: 175 LBS | SYSTOLIC BLOOD PRESSURE: 138 MMHG | BODY MASS INDEX: 27.47 KG/M2 | HEART RATE: 92 BPM | RESPIRATION RATE: 16 BRPM | OXYGEN SATURATION: 98 % | HEIGHT: 67 IN

## 2024-11-20 DIAGNOSIS — R00.0 TACHYCARDIA: ICD-10-CM

## 2024-11-20 DIAGNOSIS — E03.8 OTHER SPECIFIED HYPOTHYROIDISM: ICD-10-CM

## 2024-11-20 DIAGNOSIS — F33.41 RECURRENT MAJOR DEPRESSIVE DISORDER, IN PARTIAL REMISSION (HCC): Primary | ICD-10-CM

## 2024-11-20 DIAGNOSIS — I10 ESSENTIAL HYPERTENSION, BENIGN: ICD-10-CM

## 2024-11-20 PROCEDURE — G2211 COMPLEX E/M VISIT ADD ON: HCPCS | Performed by: FAMILY MEDICINE

## 2024-11-20 PROCEDURE — 99214 OFFICE O/P EST MOD 30 MIN: CPT | Performed by: FAMILY MEDICINE

## 2024-11-20 RX ORDER — ESCITALOPRAM OXALATE 10 MG/1
10 TABLET ORAL DAILY
Qty: 30 TABLET | Refills: 5 | Status: SHIPPED | OUTPATIENT
Start: 2024-11-20

## 2024-11-20 RX ORDER — METOPROLOL SUCCINATE 25 MG/1
25 TABLET, EXTENDED RELEASE ORAL DAILY
Qty: 100 TABLET | Refills: 3 | Status: SHIPPED | OUTPATIENT
Start: 2024-11-20

## 2024-11-20 RX ORDER — LEVOTHYROXINE SODIUM 75 UG/1
75 TABLET ORAL EVERY MORNING
Qty: 30 TABLET | Refills: 4 | Status: SHIPPED | OUTPATIENT
Start: 2024-11-20

## 2024-11-20 NOTE — PROGRESS NOTES
Ambulatory Visit  Name: Fabrizio Owusu      : 1970      MRN: [id number]  Encounter Provider: Toñito Rodriguez MD  Encounter Date: 2024   Encounter department: [facility] PRIMARY CARE [facility]    Assessment & Plan  Recurrent major depressive disorder, in partial remission (HCC)  . Depression:  - Continue Escitalopram 10mg daily after breakfast        Orders:    escitalopram (LEXAPRO) 10 mg tablet; Take 1 tablet (10 mg total) by mouth daily After breakfast    Other specified hypothyroidism    - Continue levothyroxine with modified administration instructions  - Take on empty stomach in morning with water, wait 30 minutes before food/other medications  Orders:    levothyroxine 75 mcg tablet; Take 1 tablet (75 mcg total) by mouth every morning Fasting and wait 1/2 hr for coffee, meals or any other medication.    Tachycardia    Orders:    metoprolol succinate (TOPROL-XL) 25 mg 24 hr tablet; Take 1 tablet (25 mg total) by mouth daily    Essential hypertension, benign  2. Hypertension:  - Medication timing adjusted: Move antihypertensive to evening dosing  - Continue Amlodipine 1 tablet daily after dinner (increased from 1/2 tablet)  - Blood pressure currently well-controlled on current regimen  4. Hypercholesterolemia:  - Previously slightly elevated, now normalized  - Continue current management approach          History of Present Illness     HPI  Chief Complaint   Patient presents with    Blood Pressure Check    Hypothyroidism    Bolivian-speaking patient presents for follow-up and medication management. Patient reports experiencing dizziness, which appears to be related to taking medications on an empty stomach. Discussion revealed several medication timing issues, particularly with thyroid medication and antihypertensives. Patient has been taking medications at suboptimal times and sometimes without food. Patient also mentions recent life changes, including moving apartments and  "references to past relationship with spouse.  History obtained from patient.    Review of Systems  Past Medical History:   Diagnosis Date    Anxiety     Cancer (HCC)     Hodgkins lymphoma    Chest pain radiating to arm     Colon polyp     Depression     Disease of thyroid gland     Hypertension     Mood disorder with psychosis (HCC) 07/12/2018    Psychiatric disorder      Past Surgical History:   Procedure Laterality Date    COLONOSCOPY      COLONOSCOPY W/ POLYPECTOMY  07/2021    HERNIA REPAIR             Objective     /80 (BP Location: Left arm, Patient Position: Sitting, Cuff Size: Standard)   Pulse 92   Temp 98.2 °F (36.8 °C) (Tympanic)   Resp 16   Ht 5' 7\" (1.702 m)   Wt 79.4 kg (175 lb)   SpO2 98%   BMI 27.41 kg/m²     Physical Exam  Non distress, normal respiratory effort. Pulse is regular. Heart without murmurs.     I have spent 35 minutes with Fabrizio today in which greater than 50% of this time was spent in counseling/coordination of care regarding Diagnostic results, Prognosis, Risks and benefits of tx options, Counseling / Coordination of care, Reviewing / ordering tests, medicine, procedures.  Please note this time includes cumulative time on the day of encounter, including reviewing medical records and/or coordinating care among the patient's other specialists.    "

## 2024-11-21 NOTE — ASSESSMENT & PLAN NOTE
2. Hypertension:  - Medication timing adjusted: Move antihypertensive to evening dosing  - Continue Amlodipine 1 tablet daily after dinner (increased from 1/2 tablet)  - Blood pressure currently well-controlled on current regimen  4. Hypercholesterolemia:  - Previously slightly elevated, now normalized  - Continue current management approach

## 2024-11-21 NOTE — ASSESSMENT & PLAN NOTE
. Depression:  - Continue Escitalopram 10mg daily after breakfast        Orders:    escitalopram (LEXAPRO) 10 mg tablet; Take 1 tablet (10 mg total) by mouth daily After breakfast

## 2024-12-25 PROCEDURE — 99285 EMERGENCY DEPT VISIT HI MDM: CPT

## 2024-12-26 ENCOUNTER — HOSPITAL ENCOUNTER (EMERGENCY)
Facility: HOSPITAL | Age: 54
Discharge: HOME | End: 2024-12-26
Payer: MEDICARE

## 2024-12-26 ENCOUNTER — APPOINTMENT (EMERGENCY)
Dept: RADIOLOGY | Facility: HOSPITAL | Age: 54
End: 2024-12-26
Payer: MEDICARE

## 2024-12-26 VITALS
DIASTOLIC BLOOD PRESSURE: 65 MMHG | TEMPERATURE: 99.5 F | RESPIRATION RATE: 16 BRPM | OXYGEN SATURATION: 95 % | HEART RATE: 95 BPM | SYSTOLIC BLOOD PRESSURE: 110 MMHG

## 2024-12-26 DIAGNOSIS — J06.9 URI (UPPER RESPIRATORY INFECTION): Primary | ICD-10-CM

## 2024-12-26 LAB
ATRIAL RATE: 105 BPM
FLUAV AG UPPER RESP QL IA.RAPID: NEGATIVE
FLUBV AG UPPER RESP QL IA.RAPID: NEGATIVE
P AXIS: 67 DEGREES
PR INTERVAL: 156 MS
QRS AXIS: 30 DEGREES
QRSD INTERVAL: 82 MS
QT INTERVAL: 346 MS
QTC INTERVAL: 458 MS
SARS-COV+SARS-COV-2 AG RESP QL IA.RAPID: NEGATIVE
T WAVE AXIS: 65 DEGREES
VENTRICULAR RATE: 105 BPM

## 2024-12-26 PROCEDURE — 93005 ELECTROCARDIOGRAM TRACING: CPT

## 2024-12-26 PROCEDURE — 99284 EMERGENCY DEPT VISIT MOD MDM: CPT

## 2024-12-26 PROCEDURE — 71046 X-RAY EXAM CHEST 2 VIEWS: CPT

## 2024-12-26 PROCEDURE — 87811 SARS-COV-2 COVID19 W/OPTIC: CPT

## 2024-12-26 PROCEDURE — 87804 INFLUENZA ASSAY W/OPTIC: CPT

## 2024-12-26 PROCEDURE — 93010 ELECTROCARDIOGRAM REPORT: CPT | Performed by: INTERNAL MEDICINE

## 2024-12-26 RX ORDER — GUAIFENESIN 600 MG/1
600 TABLET, EXTENDED RELEASE ORAL ONCE
Status: COMPLETED | OUTPATIENT
Start: 2024-12-26 | End: 2024-12-26

## 2024-12-26 RX ORDER — BENZONATATE 100 MG/1
100 CAPSULE ORAL ONCE
Status: COMPLETED | OUTPATIENT
Start: 2024-12-26 | End: 2024-12-26

## 2024-12-26 RX ORDER — GUAIFENESIN 600 MG/1
1200 TABLET, EXTENDED RELEASE ORAL 2 TIMES DAILY
Qty: 28 TABLET | Refills: 0 | Status: SHIPPED | OUTPATIENT
Start: 2024-12-26

## 2024-12-26 RX ORDER — GUAIFENESIN 600 MG/1
1200 TABLET, EXTENDED RELEASE ORAL 2 TIMES DAILY
Qty: 28 TABLET | Refills: 0 | Status: SHIPPED | OUTPATIENT
Start: 2024-12-26 | End: 2024-12-26

## 2024-12-26 RX ORDER — BENZONATATE 100 MG/1
100 CAPSULE ORAL EVERY 8 HOURS
Qty: 21 CAPSULE | Refills: 0 | Status: SHIPPED | OUTPATIENT
Start: 2024-12-26

## 2024-12-26 RX ADMIN — BENZONATATE 100 MG: 100 CAPSULE ORAL at 00:59

## 2024-12-26 RX ADMIN — GUAIFENESIN 600 MG: 600 TABLET ORAL at 00:59

## 2024-12-26 NOTE — DISCHARGE INSTRUCTIONS
[place]amina Tessalon según lo recetado según sea necesario para la tos    [place]amina Mucinex según lo recetado según sea necesario para la tos    Regresa si los síntomas empeoran, empeoran el dolor en el pecho o el dolor en el pecho no incluye tos, dificultad para respirar, hinchazón de las piernas, tos con santos o cualquier otro síntoma nuevo o preocupante   ========================  Take Tessalon as prescribed as needed for cough    Take Mucinex as prescribed as needed for cough    Return for worsening symptoms, worsening chest pain or chest pain not with coughing, trouble breathing, leg swelling, coughing up blood,  or any other new/concerning symptoms

## 2024-12-26 NOTE — ED PROVIDER NOTES
Time reflects when diagnosis was documented in both MDM as applicable and the Disposition within this note       Time User Action Codes Description Comment    12/26/2024  1:56 AM Lolita Young [J06.9] URI (upper respiratory infection)           ED Disposition       ED Disposition   Discharge    Condition   Stable    Date/Time   Thu Dec 26, 2024  1:55 AM    Comment   Fabrizio Owusu discharge to home/self care.                   Assessment & Plan       Medical Decision Making  On exam, the patient is well-appearing in no acute distress.  No dyspnea, tachypnea, cyanosis, or any other respiratory distress. Lungs CTA. Patient is well hydrated with moist mucous membranes.  Vital signs stable, patient with mild tachycardia however per chart review pt with history of tachycardia for which he was prescribed metoprolol, which he is no longer taking. No neck stiffness. Abdomen is soft and nontender.    The patient has a history and physical exam consistent with a viral illness. COVID19 and Flu testing has been performed. Will obtain CXR to evaluate for PNA.    At the time of discharge, the patient is in no acute distress. He reports feeling improved following treatment in ED. He is no longer tachycardic. I discussed with the patient the diagnosis, treatment plan, follow-up, return precautions, and discharge instructions; they were given the opportunity to ask questions and verbalized understanding.     Problems Addressed:  URI (upper respiratory infection): acute illness or injury    Amount and/or Complexity of Data Reviewed  External Data Reviewed: labs and notes.  Labs: ordered. Decision-making details documented in ED Course.  Radiology: ordered and independent interpretation performed. Decision-making details documented in ED Course.    Risk  OTC drugs.  Prescription drug management.      EKG: ST at 105 BPM, , Qtc 454, no ST elevation or depression, normal axis as interpreted by me          Medications    benzonatate (TESSALON PERLES) capsule 100 mg (100 mg Oral Given 12/26/24 0059)   guaiFENesin (MUCINEX) 12 hr tablet 600 mg (600 mg Oral Given 12/26/24 0059)       ED Risk Strat Scores                          SBIRT 22yo+      Flowsheet Row Most Recent Value   Initial Alcohol Screen: US AUDIT-C     1. How often do you have a drink containing alcohol? 0 Filed at: 12/26/2024 0039   2. How many drinks containing alcohol do you have on a typical day you are drinking?  0 Filed at: 12/26/2024 0039   3a. Male UNDER 65: How often do you have five or more drinks on one occasion? 0 Filed at: 12/26/2024 0039   Audit-C Score 0 Filed at: 12/26/2024 0039   BREANN: How many times in the past year have you...    Used an illegal drug or used a prescription medication for non-medical reasons? Never Filed at: 12/26/2024 0039                            History of Present Illness       Chief Complaint   Patient presents with    Chest Pain     States chest pain with coughing for 2 days. Also complaining of generalized body aches.       Past Medical History:   Diagnosis Date    Anxiety     Cancer (HCC)     Hodgkins lymphoma    Chest pain radiating to arm     Colon polyp     Depression     Disease of thyroid gland     Hypertension     Mood disorder with psychosis (HCC) 07/12/2018    Psychiatric disorder       Past Surgical History:   Procedure Laterality Date    COLONOSCOPY      COLONOSCOPY W/ POLYPECTOMY  07/2021    HERNIA REPAIR        Family History   Problem Relation Age of Onset    Colon cancer Father       Social History     Tobacco Use    Smoking status: Never    Smokeless tobacco: Never   Vaping Use    Vaping status: Never Used   Substance Use Topics    Alcohol use: No    Drug use: No      E-Cigarette/Vaping    E-Cigarette Use Never User       E-Cigarette/Vaping Substances    Nicotine No     THC No     CBD No     Flavoring No     Other No     Unknown No       I have reviewed and agree with the history as documented.     The patient  is a 54-year-old male with history of anxiety, hypertension, tachycardia who presents to the ED for evaluation of cough for the past 2 days.  His wife is sick with similar symptoms and also being evaluated in the ED, where she was diagnosed with Flu.  He also reports generalized bodyaches.  He reports having chest pain only when coughing.  Between coughing, he has no pain.  He did have chills last night, no measured fever. he otherwise denies abdominal pain, nausea, vomiting, dyspnea, leg swelling, hemoptysis, calf pain, recent travel, recent surgery, history of DVT/PE.        Review of Systems   Constitutional:  Negative for chills and fever.   HENT:  Negative for congestion and rhinorrhea.    Respiratory:  Positive for cough. Negative for shortness of breath.    Cardiovascular:  Positive for chest pain. Negative for leg swelling.   Gastrointestinal:  Negative for abdominal pain, constipation, diarrhea, nausea and vomiting.   Genitourinary:  Negative for dysuria and flank pain.   Musculoskeletal:  Negative for arthralgias and myalgias.   Skin:  Negative for rash and wound.   Neurological:  Negative for dizziness, weakness, numbness and headaches.           Objective       ED Triage Vitals [12/25/24 2348]   Temperature Pulse Blood Pressure Respirations SpO2 Patient Position - Orthostatic VS   99.5 °F (37.5 °C) (!) 114 112/85 19 97 % Standing      Temp Source Heart Rate Source BP Location FiO2 (%) Pain Score    Oral Monitor Right arm -- --      Vitals      Date and Time Temp Pulse SpO2 Resp BP Pain Score FACES Pain Rating User   12/26/24 0200 -- 95 95 % 16 110/65 -- -- AA   12/25/24 2348 99.5 °F (37.5 °C) 114 97 % 19 112/85 -- -- RC            Physical Exam  Vitals and nursing note reviewed.   Constitutional:       General: He is not in acute distress.     Appearance: He is well-developed. He is not toxic-appearing.   HENT:      Head: Normocephalic and atraumatic.   Eyes:      Conjunctiva/sclera: Conjunctivae normal.    Cardiovascular:      Rate and Rhythm: Normal rate and regular rhythm.      Heart sounds: No murmur heard.  Pulmonary:      Effort: Pulmonary effort is normal. No respiratory distress.      Breath sounds: Normal breath sounds. No decreased breath sounds, wheezing, rhonchi or rales.   Abdominal:      Palpations: Abdomen is soft.      Tenderness: There is no abdominal tenderness.   Musculoskeletal:         General: No swelling.      Cervical back: Neck supple.      Right lower leg: No tenderness. No edema.      Left lower leg: No tenderness. No edema.   Skin:     General: Skin is warm and dry.      Capillary Refill: Capillary refill takes less than 2 seconds.   Neurological:      Mental Status: He is alert.   Psychiatric:         Mood and Affect: Mood normal.         Results Reviewed       Procedure Component Value Units Date/Time    FLU/COVID Rapid Antigen (30 min. TAT) - Preferred screening test in ED [[id number]]  (Normal) Collected: 12/26/24 0059    Lab Status: Final result Specimen: Nares from Nose Updated: 12/26/24 0120     SARS COV Rapid Antigen Negative     Influenza A Rapid Antigen Negative     Influenza B Rapid Antigen Negative    Narrative:      This test has been performed using the Quidel Tiffany 2 FLU+SARS Antigen test under the Emergency Use Authorization (EUA). This test has been validated by the  and verified by the performing laboratory. The Tiffany uses lateral flow immunofluorescent sandwich assay to detect SARS-COV, Influenza A and Influenza B Antigen.     The Quidel Tiffany 2 SARS Antigen test does not differentiate between SARS-CoV and SARS-CoV-2.     Negative results are presumptive and may be confirmed with a molecular assay, if necessary, for patient management. Negative results do not rule out SARS-CoV-2 or influenza infection and should not be used as the sole basis for treatment or patient management decisions. A negative test result may occur if the level of antigen in a sample is  below the limit of detection of this test.     Positive results are indicative of the presence of viral antigens, but do not rule out bacterial infection or co-infection with other viruses.     All test results should be used as an adjunct to clinical observations and other information available to the provider.    FOR PEDIATRIC PATIENTS - copy/paste COVID Guidelines URL to browser: [email or web address]            XR chest 2 views   ED Interpretation by Lolita Young PA-C (12/26 0155)   No evidence of infiltrate, pneumothorax, or acute cardiopulmonary disease as interpreted by me          Procedures    ED Medication and Procedure Management   Prior to Admission Medications   Prescriptions Last Dose Informant Patient Reported? Taking?   Artificial Tears 1.4 % ophthalmic solution  Self Yes No   escitalopram (LEXAPRO) 10 mg tablet   No No   Sig: Take 1 tablet (10 mg total) by mouth daily After breakfast   levothyroxine 75 mcg tablet   No No   Sig: Take 1 tablet (75 mcg total) by mouth every morning Fasting and wait 1/2 hr for coffee, meals or any other medication.   metoprolol succinate (TOPROL-XL) 25 mg 24 hr tablet   No No   Sig: Take 1 tablet (25 mg total) by mouth daily      Facility-Administered Medications: None     Discharge Medication List as of 12/26/2024  1:57 AM        START taking these medications    Details   benzonatate (TESSALON PERLES) 100 mg capsule Take 1 capsule (100 mg total) by mouth every 8 (eight) hours, Starting Thu 12/26/2024, Normal      guaiFENesin (MUCINEX) 600 mg 12 hr tablet Take 2 tablets (1,200 mg total) by mouth 2 (two) times a day, Starting Thu 12/26/2024, Print           CONTINUE these medications which have NOT CHANGED    Details   Artificial Tears 1.4 % ophthalmic solution Starting Mon 12/26/2022, Historical Med      escitalopram (LEXAPRO) 10 mg tablet Take 1 tablet (10 mg total) by mouth daily After breakfast,  Starting Wed 11/20/2024, Normal      levothyroxine 75 mcg tablet Take 1 tablet (75 mcg total) by mouth every morning Fasting and wait 1/2 hr for coffee, meals or any other medication., Starting Wed 11/20/2024, Normal      metoprolol succinate (TOPROL-XL) 25 mg 24 hr tablet Take 1 tablet (25 mg total) by mouth daily, Starting Wed 11/20/2024, Normal           No discharge procedures on file.  ED SEPSIS DOCUMENTATION   Time reflects when diagnosis was documented in both MDM as applicable and the Disposition within this note       Time User Action Codes Description Comment    12/26/2024  1:56 AM Lolita Young Add [J06.9] URI (upper respiratory infection)                  Lolita Young PA-C  12/26/24 0338

## 2025-02-05 ENCOUNTER — RA CDI HCC (OUTPATIENT)
Dept: OTHER | Facility: HOSPITAL | Age: 55
End: 2025-02-05

## 2025-02-20 ENCOUNTER — APPOINTMENT (OUTPATIENT)
Dept: LAB | Facility: HOSPITAL | Age: 55
End: 2025-02-20
Payer: COMMERCIAL

## 2025-02-20 DIAGNOSIS — Z76.89 ENCOUNTER TO ESTABLISH CARE: ICD-10-CM

## 2025-02-20 DIAGNOSIS — M25.50 ARTHRALGIA, UNSPECIFIED JOINT: ICD-10-CM

## 2025-02-20 LAB
25(OH)D3 SERPL-MCNC: 19 NG/ML (ref 30–100)
ALBUMIN SERPL BCG-MCNC: 4.6 G/DL (ref 3.5–5)
ALP SERPL-CCNC: 84 U/L (ref 34–104)
ALT SERPL W P-5'-P-CCNC: 17 U/L (ref 7–52)
ANION GAP SERPL CALCULATED.3IONS-SCNC: 8 MMOL/L (ref 4–13)
AST SERPL W P-5'-P-CCNC: 16 U/L (ref 13–39)
BACTERIA UR QL AUTO: NORMAL /HPF
BASOPHILS # BLD AUTO: 0.05 THOUSANDS/ΜL (ref 0–0.1)
BASOPHILS NFR BLD AUTO: 1 % (ref 0–1)
BILIRUB SERPL-MCNC: 0.54 MG/DL (ref 0.2–1)
BILIRUB UR QL STRIP: NEGATIVE
BUN SERPL-MCNC: 15 MG/DL (ref 5–25)
CALCIUM SERPL-MCNC: 9.2 MG/DL (ref 8.4–10.2)
CCP AB SER IA-ACNC: 1.3 (ref ?–10)
CHLORIDE SERPL-SCNC: 103 MMOL/L (ref 96–108)
CHOLEST SERPL-MCNC: 166 MG/DL (ref ?–200)
CLARITY UR: CLEAR
CO2 SERPL-SCNC: 29 MMOL/L (ref 21–32)
COARSE GRAN CASTS URNS QL MICRO: NORMAL /LPF
COLOR UR: YELLOW
CREAT SERPL-MCNC: 0.9 MG/DL (ref 0.6–1.3)
CREAT UR-MCNC: 221.8 MG/DL
EOSINOPHIL # BLD AUTO: 0.08 THOUSAND/ΜL (ref 0–0.61)
EOSINOPHIL NFR BLD AUTO: 1 % (ref 0–6)
ERYTHROCYTE [DISTWIDTH] IN BLOOD BY AUTOMATED COUNT: 13.4 % (ref 11.6–15.1)
EST. AVERAGE GLUCOSE BLD GHB EST-MCNC: 114 MG/DL
GFR SERPL CREATININE-BSD FRML MDRD: 95 ML/MIN/1.73SQ M
GLUCOSE P FAST SERPL-MCNC: 94 MG/DL (ref 65–99)
GLUCOSE UR STRIP-MCNC: NEGATIVE MG/DL
HBA1C MFR BLD: 5.6 %
HCT VFR BLD AUTO: 47 % (ref 36.5–49.3)
HDLC SERPL-MCNC: 54 MG/DL
HGB BLD-MCNC: 15.6 G/DL (ref 12–17)
HGB UR QL STRIP.AUTO: 25
IMM GRANULOCYTES # BLD AUTO: 0.04 THOUSAND/UL (ref 0–0.2)
IMM GRANULOCYTES NFR BLD AUTO: 1 % (ref 0–2)
KETONES UR STRIP-MCNC: NEGATIVE MG/DL
LDLC SERPL CALC-MCNC: 100 MG/DL (ref 0–100)
LEUKOCYTE ESTERASE UR QL STRIP: NEGATIVE
LYMPHOCYTES # BLD AUTO: 1.05 THOUSANDS/ΜL (ref 0.6–4.47)
LYMPHOCYTES NFR BLD AUTO: 15 % (ref 14–44)
MCH RBC QN AUTO: 30.6 PG (ref 26.8–34.3)
MCHC RBC AUTO-ENTMCNC: 33.2 G/DL (ref 31.4–37.4)
MCV RBC AUTO: 92 FL (ref 82–98)
MICROALBUMIN UR-MCNC: 50 MG/L
MICROALBUMIN/CREAT 24H UR: 23 MG/G CREATININE (ref 0–30)
MONOCYTES # BLD AUTO: 0.62 THOUSAND/ΜL (ref 0.17–1.22)
MONOCYTES NFR BLD AUTO: 9 % (ref 4–12)
NEUTROPHILS # BLD AUTO: 5.05 THOUSANDS/ΜL (ref 1.85–7.62)
NEUTS SEG NFR BLD AUTO: 73 % (ref 43–75)
NITRITE UR QL STRIP: NEGATIVE
NON-SQ EPI CELLS URNS QL MICRO: NORMAL /HPF
NONHDLC SERPL-MCNC: 112 MG/DL
NRBC BLD AUTO-RTO: 0 /100 WBCS
PH UR STRIP.AUTO: 6 [PH]
PLATELET # BLD AUTO: 310 THOUSANDS/UL (ref 149–390)
PMV BLD AUTO: 10.2 FL (ref 8.9–12.7)
POTASSIUM SERPL-SCNC: 4.1 MMOL/L (ref 3.5–5.3)
PROT SERPL-MCNC: 7.9 G/DL (ref 6.4–8.4)
PROT UR STRIP-MCNC: ABNORMAL MG/DL
PSA FREE MFR SERPL: 18.21 %
PSA FREE SERPL-MCNC: 0.12 NG/ML
PSA SERPL-MCNC: 0.68 NG/ML (ref 0–4)
RBC # BLD AUTO: 5.09 MILLION/UL (ref 3.88–5.62)
RBC #/AREA URNS AUTO: NORMAL /HPF
SODIUM SERPL-SCNC: 140 MMOL/L (ref 135–147)
SP GR UR STRIP.AUTO: 1.01 (ref 1–1.04)
T4 FREE SERPL-MCNC: 0.83 NG/DL (ref 0.61–1.12)
TRIGL SERPL-MCNC: 62 MG/DL (ref ?–150)
TSH SERPL DL<=0.05 MIU/L-ACNC: 4.07 UIU/ML (ref 0.45–4.5)
URATE SERPL-MCNC: 5.1 MG/DL (ref 3.5–8.5)
UROBILINOGEN UA: NEGATIVE MG/DL
VIT B12 SERPL-MCNC: 179 PG/ML (ref 180–914)
WBC # BLD AUTO: 6.89 THOUSAND/UL (ref 4.31–10.16)
WBC #/AREA URNS AUTO: NORMAL /HPF

## 2025-02-20 PROCEDURE — 82306 VITAMIN D 25 HYDROXY: CPT

## 2025-02-20 PROCEDURE — 84439 ASSAY OF FREE THYROXINE: CPT

## 2025-02-20 PROCEDURE — 84550 ASSAY OF BLOOD/URIC ACID: CPT

## 2025-02-20 PROCEDURE — 85025 COMPLETE CBC W/AUTO DIFF WBC: CPT

## 2025-02-20 PROCEDURE — 80053 COMPREHEN METABOLIC PANEL: CPT

## 2025-02-20 PROCEDURE — 36415 COLL VENOUS BLD VENIPUNCTURE: CPT

## 2025-02-20 PROCEDURE — 84153 ASSAY OF PSA TOTAL: CPT

## 2025-02-20 PROCEDURE — 82607 VITAMIN B-12: CPT

## 2025-02-20 PROCEDURE — 82570 ASSAY OF URINE CREATININE: CPT

## 2025-02-20 PROCEDURE — 84443 ASSAY THYROID STIM HORMONE: CPT

## 2025-02-20 PROCEDURE — 82043 UR ALBUMIN QUANTITATIVE: CPT

## 2025-02-20 PROCEDURE — 83036 HEMOGLOBIN GLYCOSYLATED A1C: CPT

## 2025-02-20 PROCEDURE — 86200 CCP ANTIBODY: CPT

## 2025-02-20 PROCEDURE — 84154 ASSAY OF PSA FREE: CPT

## 2025-02-20 PROCEDURE — 81001 URINALYSIS AUTO W/SCOPE: CPT

## 2025-02-20 PROCEDURE — 83695 ASSAY OF LIPOPROTEIN(A): CPT

## 2025-02-20 PROCEDURE — 81003 URINALYSIS AUTO W/O SCOPE: CPT

## 2025-02-20 PROCEDURE — 80061 LIPID PANEL: CPT

## 2025-02-21 LAB — LPA SERPL-SCNC: <9 NMOL/L

## 2025-02-23 NOTE — PROGRESS NOTES
Name: Fabrizio Owusu      : 1970      MRN: [id number]  Encounter Provider: Rayne Viera DO  Encounter Date: 2025   Encounter department: [facility] [place]  :  Assessment & Plan  Family history of colon cancer  Patient with family history of colon cancer diagnosed in father at age of 55. Last colonoscopy in  at which time he had x1 TA. Fortunately asymptomatic at this time with no upper/lower GI complaints or presence of alarm symptoms. Will require surveillance colonoscopy every x5 year due to family history.     Plan:  Due for colonoscopy 2026 - will place recall       History of colon polyps  Last colonoscopy in  with x1 tubular adenoma.            History of Present Illness   HPI  Fabrizio Owusu is a 54 y.o. male with a PMHx of HTN, hypothyroidism, depression/anxiety, and history of Hodgkin's Lymphoma who presents to the office today for new patient consultation. Patient unclear of reason for visit today and said he was referred by PCP. Patient asymptomatic and with no symptoms. Denies any reflux, N/V, regurgitation, abdominal pain/distension, change in bowel habits, or black/bloody stools.     Prior colonoscopy 2021 with x1 small tubular adenoma. Recall recommended in x5 years due to family history of colon cancer. Father with colon cancer and was 55 years of age at time of diagnosis. No prior EGD.     Most recent blood work completed 2025 which revealed normal LFTs. Hemoglobin also 15.6 with MCV 92, consistent with baseline. Previously tested for hepatitis C 10/2022 and negative. No abdominal imaging available to review.      History obtained from: patient             Medical History Reviewed by provider this encounter:     .  Current Outpatient Medications on File Prior to Visit   Medication Sig Dispense Refill    levothyroxine 75 mcg tablet Take 1 tablet (75 mcg total) by mouth every morning Fasting and wait 1/2 hr for coffee, meals or any  "other medication. 30 tablet 4    metoprolol succinate (TOPROL-XL) 25 mg 24 hr tablet Take 1 tablet (25 mg total) by mouth daily 100 tablet 3    Artificial Tears 1.4 % ophthalmic solution  (Patient not taking: Reported on 2/24/2025)      benzonatate (TESSALON PERLES) 100 mg capsule Take 1 capsule (100 mg total) by mouth every 8 (eight) hours (Patient not taking: Reported on 2/24/2025) 21 capsule 0    escitalopram (LEXAPRO) 10 mg tablet Take 1 tablet (10 mg total) by mouth daily After breakfast (Patient not taking: Reported on 2/24/2025) 30 tablet 5    guaiFENesin (MUCINEX) 600 mg 12 hr tablet Take 2 tablets (1,200 mg total) by mouth 2 (two) times a day (Patient not taking: Reported on 2/24/2025) 28 tablet 0     No current facility-administered medications on file prior to visit.      Social History     Tobacco Use    Smoking status: Never    Smokeless tobacco: Never   Vaping Use    Vaping status: Never Used   Substance and Sexual Activity    Alcohol use: No    Drug use: No    Sexual activity: Not Currently        Objective   /87 (BP Location: Left arm, Patient Position: Sitting, Cuff Size: Standard)   Pulse 105   Temp 97.8 °F (36.6 °C) (Tympanic)   Ht 5' 7\" (1.702 m)   Wt 76.2 kg (168 lb)   SpO2 96%   BMI 26.31 kg/m²      Physical Exam  Constitutional:       General: He is not in acute distress.     Appearance: He is normal weight. He is not ill-appearing or toxic-appearing.   HENT:      Head: Normocephalic and atraumatic.      Mouth/Throat:      Mouth: Mucous membranes are moist.      Pharynx: No oropharyngeal exudate.   Eyes:      General: No scleral icterus.  Cardiovascular:      Rate and Rhythm: Normal rate.      Pulses: Normal pulses.   Pulmonary:      Effort: Pulmonary effort is normal. No respiratory distress.   Abdominal:      General: Abdomen is flat. There is no distension.      Tenderness: There is no abdominal tenderness.      Hernia: No hernia is present.   Musculoskeletal:      Cervical " back: Normal range of motion.   Skin:     General: Skin is warm.      Coloration: Skin is not jaundiced.   Neurological:      Mental Status: He is alert and oriented to person, place, and time.   Psychiatric:         Mood and Affect: Mood normal.         Behavior: Behavior normal.         Administrative Statements   I have spent a total time of 30  minutes in caring for this patient on the day of the visit/encounter including Diagnostic results, Prognosis, Instructions for management, Patient and family education, Impressions, Counseling / Coordination of care, Documenting in the medical record, and Obtaining or reviewing history  .

## 2025-02-24 ENCOUNTER — CONSULT (OUTPATIENT)
Dept: GASTROENTEROLOGY | Facility: CLINIC | Age: 55
End: 2025-02-24
Payer: COMMERCIAL

## 2025-02-24 VITALS
WEIGHT: 168 LBS | OXYGEN SATURATION: 96 % | HEART RATE: 105 BPM | DIASTOLIC BLOOD PRESSURE: 87 MMHG | BODY MASS INDEX: 26.37 KG/M2 | SYSTOLIC BLOOD PRESSURE: 124 MMHG | TEMPERATURE: 97.8 F | HEIGHT: 67 IN

## 2025-02-24 DIAGNOSIS — Z80.0 FAMILY HISTORY OF COLON CANCER: Primary | ICD-10-CM

## 2025-02-24 DIAGNOSIS — Z86.0100 HISTORY OF COLON POLYPS: ICD-10-CM

## 2025-02-24 PROCEDURE — G2211 COMPLEX E/M VISIT ADD ON: HCPCS | Performed by: INTERNAL MEDICINE

## 2025-02-24 PROCEDURE — 99203 OFFICE O/P NEW LOW 30 MIN: CPT | Performed by: INTERNAL MEDICINE

## 2025-02-24 NOTE — ASSESSMENT & PLAN NOTE
Patient with family history of colon cancer diagnosed in father at age of 55. Last colonoscopy in 2021 at which time he had x1 TA. Fortunately asymptomatic at this time with no upper/lower GI complaints or presence of alarm symptoms. Will require surveillance colonoscopy every x5 year due to family history.     Plan:  Due for colonoscopy 7/2026 - will place recall

## 2025-02-26 ENCOUNTER — HOSPITAL ENCOUNTER (OUTPATIENT)
Dept: BONE DENSITY | Facility: CLINIC | Age: 55
Discharge: HOME | End: 2025-02-26
Payer: COMMERCIAL

## 2025-02-26 VITALS — WEIGHT: 163 LBS | HEIGHT: 66 IN | BODY MASS INDEX: 26.2 KG/M2

## 2025-02-26 DIAGNOSIS — Z13.9 SCREENING DUE: ICD-10-CM

## 2025-02-26 PROCEDURE — 77080 DXA BONE DENSITY AXIAL: CPT

## 2025-04-10 ENCOUNTER — APPOINTMENT (OUTPATIENT)
Dept: LAB | Facility: HOSPITAL | Age: 55
End: 2025-04-10
Payer: COMMERCIAL

## 2025-04-10 DIAGNOSIS — Z13.29 SCREENING FOR THYROID DISORDER: Primary | ICD-10-CM

## 2025-04-10 DIAGNOSIS — I10 ESSENTIAL HYPERTENSION, MALIGNANT: ICD-10-CM

## 2025-04-10 DIAGNOSIS — N18.1 CHRONIC KIDNEY DISEASE, STAGE I: ICD-10-CM

## 2025-04-10 LAB
ALBUMIN SERPL BCG-MCNC: 4.6 G/DL (ref 3.5–5)
ALP SERPL-CCNC: 79 U/L (ref 34–104)
ALT SERPL W P-5'-P-CCNC: 31 U/L (ref 7–52)
ANION GAP SERPL CALCULATED.3IONS-SCNC: 7 MMOL/L (ref 4–13)
AST SERPL W P-5'-P-CCNC: 21 U/L (ref 13–39)
BILIRUB SERPL-MCNC: 0.57 MG/DL (ref 0.2–1)
BUN SERPL-MCNC: 17 MG/DL (ref 5–25)
CALCIUM SERPL-MCNC: 9.5 MG/DL (ref 8.4–10.2)
CHLORIDE SERPL-SCNC: 102 MMOL/L (ref 96–108)
CHOLEST SERPL-MCNC: 165 MG/DL (ref ?–200)
CO2 SERPL-SCNC: 28 MMOL/L (ref 21–32)
CREAT SERPL-MCNC: 0.8 MG/DL (ref 0.6–1.3)
GFR SERPL CREATININE-BSD FRML MDRD: 100 ML/MIN/1.73SQ M
GLUCOSE P FAST SERPL-MCNC: 90 MG/DL (ref 65–99)
HDLC SERPL-MCNC: 50 MG/DL
LDLC SERPL CALC-MCNC: 84 MG/DL (ref 0–100)
NONHDLC SERPL-MCNC: 115 MG/DL
POTASSIUM SERPL-SCNC: 4.3 MMOL/L (ref 3.5–5.3)
PROT SERPL-MCNC: 7.9 G/DL (ref 6.4–8.4)
SODIUM SERPL-SCNC: 137 MMOL/L (ref 135–147)
T4 FREE SERPL-MCNC: 1.1 NG/DL (ref 0.61–1.12)
TRIGL SERPL-MCNC: 156 MG/DL (ref ?–150)
TSH SERPL DL<=0.05 MIU/L-ACNC: 0.08 UIU/ML (ref 0.45–4.5)

## 2025-04-10 PROCEDURE — 80053 COMPREHEN METABOLIC PANEL: CPT

## 2025-04-10 PROCEDURE — 84443 ASSAY THYROID STIM HORMONE: CPT

## 2025-04-10 PROCEDURE — 36415 COLL VENOUS BLD VENIPUNCTURE: CPT

## 2025-04-10 PROCEDURE — 80061 LIPID PANEL: CPT

## 2025-04-10 PROCEDURE — 84439 ASSAY OF FREE THYROXINE: CPT

## 2025-05-30 ENCOUNTER — APPOINTMENT (OUTPATIENT)
Dept: LAB | Facility: HOSPITAL | Age: 55
End: 2025-05-30
Payer: COMMERCIAL

## 2025-05-30 DIAGNOSIS — Z01.818 PRE-OP EVALUATION: ICD-10-CM

## 2025-05-30 DIAGNOSIS — R80.9 PROTEINURIA, UNSPECIFIED TYPE: ICD-10-CM

## 2025-05-30 DIAGNOSIS — E53.8 VITAMIN B12 DEFICIENCY: ICD-10-CM

## 2025-05-30 DIAGNOSIS — E78.5 DYSLIPIDEMIA: ICD-10-CM

## 2025-05-30 DIAGNOSIS — I10 HYPERTENSION, UNSPECIFIED TYPE: ICD-10-CM

## 2025-05-30 DIAGNOSIS — E55.9 VITAMIN D DEFICIENCY: ICD-10-CM

## 2025-05-30 DIAGNOSIS — R31.9 HEMATURIA, UNSPECIFIED TYPE: ICD-10-CM

## 2025-05-30 DIAGNOSIS — N18.1 CKD (CHRONIC KIDNEY DISEASE) STAGE 1, GFR 90 ML/MIN OR GREATER: ICD-10-CM

## 2025-05-30 DIAGNOSIS — R73.03 PREDIABETES: ICD-10-CM

## 2025-05-30 DIAGNOSIS — E79.0 HIGH BLOOD URIC ACID LEVEL: ICD-10-CM

## 2025-05-30 LAB
25(OH)D3 SERPL-MCNC: 23.8 NG/ML (ref 30–100)
ALBUMIN SERPL BCG-MCNC: 4.6 G/DL (ref 3.5–5)
ALP SERPL-CCNC: 74 U/L (ref 34–104)
ALT SERPL W P-5'-P-CCNC: 12 U/L (ref 7–52)
ANION GAP SERPL CALCULATED.3IONS-SCNC: 9 MMOL/L (ref 4–13)
AST SERPL W P-5'-P-CCNC: 14 U/L (ref 13–39)
BACTERIA UR QL AUTO: ABNORMAL /HPF
BASOPHILS # BLD AUTO: 0.06 THOUSANDS/ÂΜL (ref 0–0.1)
BASOPHILS NFR BLD AUTO: 1 % (ref 0–1)
BILIRUB SERPL-MCNC: 0.56 MG/DL (ref 0.2–1)
BILIRUB UR QL STRIP: NEGATIVE
BUN SERPL-MCNC: 21 MG/DL (ref 5–25)
CALCIUM SERPL-MCNC: 9.4 MG/DL (ref 8.4–10.2)
CHLORIDE SERPL-SCNC: 103 MMOL/L (ref 96–108)
CLARITY UR: CLEAR
CO2 SERPL-SCNC: 27 MMOL/L (ref 21–32)
COLOR UR: ABNORMAL
CREAT SERPL-MCNC: 0.86 MG/DL (ref 0.6–1.3)
CREAT UR-MCNC: 233 MG/DL
EOSINOPHIL # BLD AUTO: 0.15 THOUSAND/ÂΜL (ref 0–0.61)
EOSINOPHIL NFR BLD AUTO: 2 % (ref 0–6)
ERYTHROCYTE [DISTWIDTH] IN BLOOD BY AUTOMATED COUNT: 13.3 % (ref 11.6–15.1)
GFR SERPL CREATININE-BSD FRML MDRD: 97 ML/MIN/1.73SQ M
GLUCOSE P FAST SERPL-MCNC: 87 MG/DL (ref 65–99)
GLUCOSE UR STRIP-MCNC: ABNORMAL MG/DL
HAV IGM SER QL: NORMAL
HBV CORE IGM SER QL: NORMAL
HBV SURFACE AG SER QL: NORMAL
HCT VFR BLD AUTO: 49.1 % (ref 36.5–49.3)
HCV AB SER QL: NORMAL
HGB BLD-MCNC: 15.8 G/DL (ref 12–17)
HGB UR QL STRIP.AUTO: 25
IMM GRANULOCYTES # BLD AUTO: 0.04 THOUSAND/UL (ref 0–0.2)
IMM GRANULOCYTES NFR BLD AUTO: 1 % (ref 0–2)
INR PPP: 0.94 (ref 0.85–1.19)
KETONES UR STRIP-MCNC: NEGATIVE MG/DL
LEUKOCYTE ESTERASE UR QL STRIP: NEGATIVE
LYMPHOCYTES # BLD AUTO: 1.51 THOUSANDS/ÂΜL (ref 0.6–4.47)
LYMPHOCYTES NFR BLD AUTO: 22 % (ref 14–44)
MCH RBC QN AUTO: 30.4 PG (ref 26.8–34.3)
MCHC RBC AUTO-ENTMCNC: 32.2 G/DL (ref 31.4–37.4)
MCV RBC AUTO: 95 FL (ref 82–98)
MICROALBUMIN UR-MCNC: 65 MG/L
MICROALBUMIN/CREAT 24H UR: 28 MG/G CREATININE (ref 0–30)
MONOCYTES # BLD AUTO: 0.64 THOUSAND/ÂΜL (ref 0.17–1.22)
MONOCYTES NFR BLD AUTO: 9 % (ref 4–12)
MUCOUS THREADS UR QL AUTO: ABNORMAL
NEUTROPHILS # BLD AUTO: 4.61 THOUSANDS/ÂΜL (ref 1.85–7.62)
NEUTS SEG NFR BLD AUTO: 65 % (ref 43–75)
NITRITE UR QL STRIP: NEGATIVE
NON-SQ EPI CELLS URNS QL MICRO: ABNORMAL /HPF
NRBC BLD AUTO-RTO: 0 /100 WBCS
PH UR STRIP.AUTO: 5 [PH]
PLATELET # BLD AUTO: 305 THOUSANDS/UL (ref 149–390)
PMV BLD AUTO: 10.9 FL (ref 8.9–12.7)
POTASSIUM SERPL-SCNC: 3.8 MMOL/L (ref 3.5–5.3)
PROT SERPL-MCNC: 8.1 G/DL (ref 6.4–8.4)
PROT UR STRIP-MCNC: ABNORMAL MG/DL
PROTHROMBIN TIME: 12.9 SECONDS (ref 12.3–15)
PTH-INTACT SERPL-MCNC: 41.8 PG/ML (ref 12–88)
RBC # BLD AUTO: 5.19 MILLION/UL (ref 3.88–5.62)
RBC #/AREA URNS AUTO: ABNORMAL /HPF
SODIUM SERPL-SCNC: 139 MMOL/L (ref 135–147)
SP GR UR STRIP.AUTO: 1.02 (ref 1–1.04)
URATE SERPL-MCNC: 4.6 MG/DL (ref 3.5–8.5)
UROBILINOGEN UA: NEGATIVE MG/DL
VIT B12 SERPL-MCNC: 262 PG/ML (ref 180–914)
WBC # BLD AUTO: 7.01 THOUSAND/UL (ref 4.31–10.16)
WBC #/AREA URNS AUTO: ABNORMAL /HPF

## 2025-05-30 PROCEDURE — 86038 ANTINUCLEAR ANTIBODIES: CPT

## 2025-05-30 PROCEDURE — 86225 DNA ANTIBODY NATIVE: CPT

## 2025-05-30 PROCEDURE — 80074 ACUTE HEPATITIS PANEL: CPT

## 2025-05-30 PROCEDURE — 82570 ASSAY OF URINE CREATININE: CPT

## 2025-05-30 PROCEDURE — 84165 PROTEIN E-PHORESIS SERUM: CPT

## 2025-05-30 PROCEDURE — 36415 COLL VENOUS BLD VENIPUNCTURE: CPT

## 2025-05-30 PROCEDURE — 82595 ASSAY OF CRYOGLOBULIN: CPT

## 2025-05-30 PROCEDURE — 80053 COMPREHEN METABOLIC PANEL: CPT

## 2025-05-30 PROCEDURE — 81003 URINALYSIS AUTO W/O SCOPE: CPT

## 2025-05-30 PROCEDURE — 81001 URINALYSIS AUTO W/SCOPE: CPT

## 2025-05-30 PROCEDURE — 82607 VITAMIN B-12: CPT

## 2025-05-30 PROCEDURE — 84166 PROTEIN E-PHORESIS/URINE/CSF: CPT

## 2025-05-30 PROCEDURE — 83970 ASSAY OF PARATHORMONE: CPT

## 2025-05-30 PROCEDURE — 84550 ASSAY OF BLOOD/URIC ACID: CPT

## 2025-05-30 PROCEDURE — 82043 UR ALBUMIN QUANTITATIVE: CPT

## 2025-05-30 PROCEDURE — 85025 COMPLETE CBC W/AUTO DIFF WBC: CPT

## 2025-05-30 PROCEDURE — 82306 VITAMIN D 25 HYDROXY: CPT

## 2025-05-30 PROCEDURE — 87536 HIV-1 QUANT&REVRSE TRNSCRPJ: CPT

## 2025-05-30 PROCEDURE — 85610 PROTHROMBIN TIME: CPT

## 2025-06-02 LAB
DSDNA IGG SERPL IA-ACNC: 1.6 IU/ML (ref ?–15)
HIV1 RNA # PLAS NAA DL=20: NOT DETECTED {COPIES}/ML
NUCLEAR IGG SER IA-RTO: 0.2 RATIO (ref ?–1)

## 2025-06-03 LAB
ALBUMIN SERPL ELPH-MCNC: 4.5 G/DL (ref 3.2–5.1)
ALBUMIN SERPL ELPH-MCNC: 58.5 % (ref 48–70)
ALBUMIN UR ELPH-MCNC: 100 %
ALPHA1 GLOB MFR UR ELPH: 0 %
ALPHA1 GLOB SERPL ELPH-MCNC: 0.26 G/DL (ref 0.15–0.47)
ALPHA1 GLOB SERPL ELPH-MCNC: 3.4 % (ref 1.8–7)
ALPHA2 GLOB MFR UR ELPH: 0 %
ALPHA2 GLOB SERPL ELPH-MCNC: 0.69 G/DL (ref 0.42–1.04)
ALPHA2 GLOB SERPL ELPH-MCNC: 8.9 % (ref 5.9–14.9)
B-GLOBULIN MFR UR ELPH: 0 %
BETA GLOB ABNORMAL SERPL ELPH-MCNC: 0.44 G/DL (ref 0.31–0.57)
BETA1 GLOB SERPL ELPH-MCNC: 5.7 % (ref 4.7–7.7)
BETA2 GLOB SERPL ELPH-MCNC: 6 % (ref 3.1–7.9)
BETA2+GAMMA GLOB SERPL ELPH-MCNC: 0.46 G/DL (ref 0.2–0.58)
GAMMA GLOB ABNORMAL SERPL ELPH-MCNC: 1.35 G/DL (ref 0.4–1.66)
GAMMA GLOB MFR UR ELPH: 0 %
GAMMA GLOB SERPL ELPH-MCNC: 17.5 % (ref 6.9–22.3)
IGG/ALB SER: 1.41 {RATIO} (ref 1.1–1.8)
PROT SERPL-MCNC: 7.7 G/DL (ref 6.4–8.4)
PROT UR-MCNC: 31.4 MG/DL

## 2025-06-03 PROCEDURE — 84166 PROTEIN E-PHORESIS/URINE/CSF: CPT | Performed by: PATHOLOGY

## 2025-06-03 PROCEDURE — 84165 PROTEIN E-PHORESIS SERUM: CPT | Performed by: PATHOLOGY

## 2025-06-05 ENCOUNTER — HOSPITAL ENCOUNTER (OUTPATIENT)
Dept: CT IMAGING | Facility: HOSPITAL | Age: 55
End: 2025-06-05
Attending: INTERNAL MEDICINE
Payer: COMMERCIAL

## 2025-06-05 DIAGNOSIS — N28.9 KIDNEY DISORDER: ICD-10-CM

## 2025-06-05 PROCEDURE — 74176 CT ABD & PELVIS W/O CONTRAST: CPT

## 2025-06-06 LAB — CRYOGLOB SER QL 1D COLD INC: NORMAL

## 2025-06-26 ENCOUNTER — HOSPITAL ENCOUNTER (OUTPATIENT)
Dept: NON INVASIVE DIAGNOSTICS | Facility: HOSPITAL | Age: 55
Discharge: HOME | End: 2025-06-26
Attending: INTERNAL MEDICINE
Payer: COMMERCIAL

## 2025-06-26 DIAGNOSIS — N18.1 CKD (CHRONIC KIDNEY DISEASE) STAGE 1, GFR 90 ML/MIN OR GREATER: ICD-10-CM

## 2025-06-26 DIAGNOSIS — N28.89 ASYMMETRIC KIDNEYS, ACQUIRED: ICD-10-CM

## 2025-06-26 DIAGNOSIS — Q61.00 CONGENITAL RENAL CYST: ICD-10-CM

## 2025-06-26 PROCEDURE — 93975 VASCULAR STUDY: CPT

## 2025-06-26 PROCEDURE — 93975 VASCULAR STUDY: CPT | Performed by: SURGERY

## 2025-07-17 ENCOUNTER — APPOINTMENT (OUTPATIENT)
Dept: LAB | Facility: HOSPITAL | Age: 55
End: 2025-07-17
Payer: COMMERCIAL

## 2025-07-17 DIAGNOSIS — R73.03 PREDIABETES: ICD-10-CM

## 2025-07-17 DIAGNOSIS — M25.50 ARTHRALGIA, UNSPECIFIED JOINT: ICD-10-CM

## 2025-07-17 DIAGNOSIS — I10 HYPERTENSION, UNSPECIFIED TYPE: ICD-10-CM

## 2025-07-17 DIAGNOSIS — N18.1 CKD (CHRONIC KIDNEY DISEASE) STAGE 1, GFR 90 ML/MIN OR GREATER: ICD-10-CM

## 2025-07-17 DIAGNOSIS — E78.5 DYSLIPIDEMIA: ICD-10-CM

## 2025-07-17 DIAGNOSIS — R31.9 HEMATURIA, UNSPECIFIED TYPE: ICD-10-CM

## 2025-07-17 DIAGNOSIS — E03.9 HYPOTHYROIDISM, UNSPECIFIED TYPE: ICD-10-CM

## 2025-07-17 LAB
ALBUMIN SERPL BCG-MCNC: 4.4 G/DL (ref 3.5–5)
ALP SERPL-CCNC: 77 U/L (ref 34–104)
ALT SERPL W P-5'-P-CCNC: 16 U/L (ref 7–52)
ANION GAP SERPL CALCULATED.3IONS-SCNC: 6 MMOL/L (ref 4–13)
AST SERPL W P-5'-P-CCNC: 15 U/L (ref 13–39)
BILIRUB SERPL-MCNC: 0.43 MG/DL (ref 0.2–1)
BUN SERPL-MCNC: 14 MG/DL (ref 5–25)
CALCIUM SERPL-MCNC: 8.9 MG/DL (ref 8.4–10.2)
CHLORIDE SERPL-SCNC: 104 MMOL/L (ref 96–108)
CHOLEST SERPL-MCNC: 172 MG/DL (ref ?–200)
CO2 SERPL-SCNC: 30 MMOL/L (ref 21–32)
CREAT SERPL-MCNC: 0.81 MG/DL (ref 0.6–1.3)
CRP SERPL QL: 1.5 MG/L
GFR SERPL CREATININE-BSD FRML MDRD: 99 ML/MIN/1.73SQ M
GLUCOSE P FAST SERPL-MCNC: 92 MG/DL (ref 65–99)
HDLC SERPL-MCNC: 56 MG/DL
LDLC SERPL CALC-MCNC: 98 MG/DL (ref 0–100)
NONHDLC SERPL-MCNC: 116 MG/DL
POTASSIUM SERPL-SCNC: 3.9 MMOL/L (ref 3.5–5.3)
PROT SERPL-MCNC: 7.4 G/DL (ref 6.4–8.4)
SODIUM SERPL-SCNC: 140 MMOL/L (ref 135–147)
T4 FREE SERPL-MCNC: 0.61 NG/DL (ref 0.61–1.12)
TRIGL SERPL-MCNC: 90 MG/DL (ref ?–150)
TSH SERPL DL<=0.05 MIU/L-ACNC: 6.97 UIU/ML (ref 0.45–4.5)

## 2025-07-17 PROCEDURE — 86037 ANCA TITER EACH ANTIBODY: CPT

## 2025-07-17 PROCEDURE — 86140 C-REACTIVE PROTEIN: CPT

## 2025-07-17 PROCEDURE — 80061 LIPID PANEL: CPT

## 2025-07-17 PROCEDURE — 84443 ASSAY THYROID STIM HORMONE: CPT

## 2025-07-17 PROCEDURE — 83520 IMMUNOASSAY QUANT NOS NONAB: CPT

## 2025-07-17 PROCEDURE — 80053 COMPREHEN METABOLIC PANEL: CPT

## 2025-07-17 PROCEDURE — 84439 ASSAY OF FREE THYROXINE: CPT

## 2025-07-17 PROCEDURE — 36415 COLL VENOUS BLD VENIPUNCTURE: CPT

## 2025-07-19 LAB — PROTEINASE3 AB SER IA-ACNC: <0.2 UNITS (ref 0–0.9)

## 2025-07-22 ENCOUNTER — TELEPHONE (OUTPATIENT)
Age: 55
End: 2025-07-22

## 2025-07-22 LAB
C-ANCA TITR SER IF: NORMAL TITER
MYELOPEROXIDASE AB SER IA-ACNC: <0.2 UNITS (ref 0–0.9)
P-ANCA ATYPICAL TITR SER IF: NORMAL TITER
P-ANCA TITR SER IF: NORMAL TITER
PROTEINASE3 AB SER IA-ACNC: <0.2 UNITS (ref 0–0.9)

## 2025-07-22 NOTE — TELEPHONE ENCOUNTER
New Patient      Insurance   Current Insurance?[employer]   Insurance E-verified? Y    History   Reason for appointment/active symptoms?  establish care, bph     Has the patient had any previous Urologist(s)? N     Was the patient seen in the ED? N     Labs/Imaging(Including Out Of Network)? N     Records Requested? Y  Records Visible in EPIC? Y      Personal history of cancer? N     Appointment   Office location preference:  [facility]  ?   Appointment Details   Date:  8/13/25  Time:  10:00 AM   Location:  [facility]  Provider:  SHONNA  Does the appointment need further review?

## 2025-08-13 ENCOUNTER — OFFICE VISIT (OUTPATIENT)
Dept: UROLOGY | Facility: CLINIC | Age: 55
End: 2025-08-13
Payer: COMMERCIAL

## 2025-08-13 PROBLEM — R31.29 MICROSCOPIC HEMATURIA: Status: ACTIVE | Noted: 2025-08-13

## 2025-08-13 PROBLEM — R10.32 LEFT LOWER QUADRANT ABDOMINAL PAIN: Status: ACTIVE | Noted: 2025-08-13

## 2025-08-13 PROCEDURE — 81001 URINALYSIS AUTO W/SCOPE: CPT

## 2025-08-14 ENCOUNTER — RESULTS FOLLOW-UP (OUTPATIENT)
Dept: UROLOGY | Facility: CLINIC | Age: 55
End: 2025-08-14